# Patient Record
Sex: FEMALE | Race: WHITE | NOT HISPANIC OR LATINO | Employment: FULL TIME | URBAN - METROPOLITAN AREA
[De-identification: names, ages, dates, MRNs, and addresses within clinical notes are randomized per-mention and may not be internally consistent; named-entity substitution may affect disease eponyms.]

---

## 2018-03-15 ENCOUNTER — CONVERSION ENCOUNTER (OUTPATIENT)
Dept: FAMILY MEDICINE CLINIC | Facility: CLINIC | Age: 53
End: 2018-03-15

## 2018-03-16 ENCOUNTER — OFFICE VISIT CONVERTED (OUTPATIENT)
Dept: FAMILY MEDICINE CLINIC | Facility: CLINIC | Age: 53
End: 2018-03-16
Attending: FAMILY MEDICINE

## 2018-03-23 ENCOUNTER — OFFICE VISIT CONVERTED (OUTPATIENT)
Dept: CARDIOLOGY | Facility: CLINIC | Age: 53
End: 2018-03-23
Attending: INTERNAL MEDICINE

## 2018-03-29 ENCOUNTER — CONVERSION ENCOUNTER (OUTPATIENT)
Dept: CARDIOLOGY | Facility: CLINIC | Age: 53
End: 2018-03-29
Attending: INTERNAL MEDICINE

## 2018-03-29 ENCOUNTER — CONVERSION ENCOUNTER (OUTPATIENT)
Dept: CARDIOLOGY | Facility: CLINIC | Age: 53
End: 2018-03-29

## 2018-06-07 ENCOUNTER — OFFICE VISIT CONVERTED (OUTPATIENT)
Dept: OTOLARYNGOLOGY | Facility: CLINIC | Age: 53
End: 2018-06-07
Attending: OTOLARYNGOLOGY

## 2018-06-07 ENCOUNTER — CONVERSION ENCOUNTER (OUTPATIENT)
Dept: OTOLARYNGOLOGY | Facility: CLINIC | Age: 53
End: 2018-06-07

## 2018-07-23 ENCOUNTER — OFFICE VISIT CONVERTED (OUTPATIENT)
Dept: FAMILY MEDICINE CLINIC | Facility: CLINIC | Age: 53
End: 2018-07-23
Attending: NURSE PRACTITIONER

## 2018-10-04 ENCOUNTER — OFFICE VISIT CONVERTED (OUTPATIENT)
Dept: OTOLARYNGOLOGY | Facility: CLINIC | Age: 53
End: 2018-10-04
Attending: OTOLARYNGOLOGY

## 2018-10-26 ENCOUNTER — CONVERSION ENCOUNTER (OUTPATIENT)
Dept: GENERAL RADIOLOGY | Facility: HOSPITAL | Age: 53
End: 2018-10-26

## 2018-11-14 ENCOUNTER — OFFICE VISIT CONVERTED (OUTPATIENT)
Dept: NEUROSURGERY | Facility: CLINIC | Age: 53
End: 2018-11-14
Attending: PHYSICIAN ASSISTANT

## 2018-11-14 ENCOUNTER — CONVERSION ENCOUNTER (OUTPATIENT)
Dept: NEUROLOGY | Facility: CLINIC | Age: 53
End: 2018-11-14

## 2018-12-20 ENCOUNTER — OFFICE VISIT CONVERTED (OUTPATIENT)
Dept: NEUROLOGY | Facility: CLINIC | Age: 53
End: 2018-12-20
Attending: PSYCHIATRY & NEUROLOGY

## 2019-01-03 ENCOUNTER — OFFICE VISIT CONVERTED (OUTPATIENT)
Dept: NEUROSURGERY | Facility: CLINIC | Age: 54
End: 2019-01-03
Attending: NEUROLOGICAL SURGERY

## 2019-01-16 ENCOUNTER — HOSPITAL ENCOUNTER (OUTPATIENT)
Dept: PHYSICAL THERAPY | Facility: CLINIC | Age: 54
Setting detail: RECURRING SERIES
Discharge: HOME | End: 2019-01-16
Attending: NEUROLOGICAL SURGERY

## 2019-01-31 ENCOUNTER — OFFICE VISIT CONVERTED (OUTPATIENT)
Dept: NEUROSURGERY | Facility: CLINIC | Age: 54
End: 2019-01-31
Attending: NEUROLOGICAL SURGERY

## 2019-02-08 ENCOUNTER — HOSPITAL ENCOUNTER (OUTPATIENT)
Dept: PERIOP | Facility: HOSPITAL | Age: 54
Setting detail: HOSPITAL OUTPATIENT SURGERY
Discharge: HOME | End: 2019-02-08
Attending: NEUROLOGICAL SURGERY

## 2019-02-21 ENCOUNTER — OFFICE VISIT CONVERTED (OUTPATIENT)
Dept: NEUROSURGERY | Facility: CLINIC | Age: 54
End: 2019-02-21
Attending: PHYSICIAN ASSISTANT

## 2019-03-05 ENCOUNTER — HOSPITAL ENCOUNTER (OUTPATIENT)
Dept: FAMILY MEDICINE CLINIC | Facility: CLINIC | Age: 54
Discharge: HOME | End: 2019-03-05
Attending: FAMILY MEDICINE

## 2019-03-07 LAB — BACTERIA UR CULT: NORMAL

## 2019-03-25 ENCOUNTER — HOSPITAL ENCOUNTER (OUTPATIENT)
Dept: FAMILY MEDICINE CLINIC | Facility: CLINIC | Age: 54
Discharge: HOME | End: 2019-03-25
Attending: FAMILY MEDICINE

## 2019-03-30 ENCOUNTER — HOSPITAL ENCOUNTER (OUTPATIENT)
Dept: FAMILY MEDICINE CLINIC | Facility: CLINIC | Age: 54
Discharge: HOME | End: 2019-03-30
Attending: FAMILY MEDICINE

## 2019-04-02 ENCOUNTER — HOSPITAL ENCOUNTER (OUTPATIENT)
Dept: CT IMAGING | Facility: HOSPITAL | Age: 54
Discharge: HOME | End: 2019-04-02
Attending: NURSE PRACTITIONER

## 2019-04-02 LAB — D DIMER PPP FEU-MCNC: 0.79 MG/L (ref 0–0.59)

## 2019-04-30 ENCOUNTER — HOSPITAL ENCOUNTER (OUTPATIENT)
Dept: FAMILY MEDICINE CLINIC | Facility: CLINIC | Age: 54
Discharge: HOME | End: 2019-04-30
Attending: INTERNAL MEDICINE

## 2019-04-30 LAB
ALBUMIN SERPL-MCNC: 4 G/DL (ref 3.5–5)
ALBUMIN/GLOB SERPL: 1.4 {RATIO} (ref 1.4–2.6)
ALP SERPL-CCNC: 144 U/L (ref 53–141)
ALT SERPL-CCNC: 26 U/L (ref 10–40)
ANION GAP SERPL CALC-SCNC: 19 MMOL/L (ref 8–19)
AST SERPL-CCNC: 29 U/L (ref 15–50)
BASOPHILS # BLD AUTO: 0.02 10*3/UL (ref 0–0.2)
BASOPHILS NFR BLD AUTO: 0.4 % (ref 0–3)
BILIRUB SERPL-MCNC: 0.23 MG/DL (ref 0.2–1.3)
BUN SERPL-MCNC: 19 MG/DL (ref 5–25)
BUN/CREAT SERPL: 18 {RATIO} (ref 6–20)
CALCIUM SERPL-MCNC: 9.3 MG/DL (ref 8.7–10.4)
CHLORIDE SERPL-SCNC: 98 MMOL/L (ref 99–111)
CONV ABS IMM GRAN: 0.01 10*3/UL (ref 0–0.2)
CONV CO2: 23 MMOL/L (ref 22–32)
CONV IMMATURE GRAN: 0.2 % (ref 0–1.8)
CONV TOTAL PROTEIN: 6.8 G/DL (ref 6.3–8.2)
CREAT UR-MCNC: 1.07 MG/DL (ref 0.5–0.9)
DEPRECATED RDW RBC AUTO: 50 FL (ref 36.4–46.3)
EOSINOPHIL # BLD AUTO: 0.17 10*3/UL (ref 0–0.7)
EOSINOPHIL # BLD AUTO: 3.5 % (ref 0–7)
ERYTHROCYTE [DISTWIDTH] IN BLOOD BY AUTOMATED COUNT: 13.9 % (ref 11.7–14.4)
GFR SERPLBLD BASED ON 1.73 SQ M-ARVRAT: 58 ML/MIN/{1.73_M2}
GLOBULIN UR ELPH-MCNC: 2.8 G/DL (ref 2–3.5)
GLUCOSE SERPL-MCNC: 86 MG/DL (ref 65–99)
HBA1C MFR BLD: 11.6 G/DL (ref 12–16)
HCT VFR BLD AUTO: 37.3 % (ref 37–47)
LYMPHOCYTES # BLD AUTO: 1.34 10*3/UL (ref 1–5)
MCH RBC QN AUTO: 30.1 PG (ref 27–31)
MCHC RBC AUTO-ENTMCNC: 31.1 G/DL (ref 33–37)
MCV RBC AUTO: 96.9 FL (ref 81–99)
MONOCYTES # BLD AUTO: 0.38 10*3/UL (ref 0.2–1.2)
MONOCYTES NFR BLD AUTO: 7.8 % (ref 3–10)
NEUTROPHILS # BLD AUTO: 2.98 10*3/UL (ref 2–8)
NEUTROPHILS NFR BLD AUTO: 60.8 % (ref 30–85)
NRBC CBCN: 0 % (ref 0–0.7)
OSMOLALITY SERPL CALC.SUM OF ELEC: 284 MOSM/KG (ref 273–304)
PLATELET # BLD AUTO: 345 10*3/UL (ref 130–400)
PMV BLD AUTO: 10.6 FL (ref 9.4–12.3)
POTASSIUM SERPL-SCNC: 4.3 MMOL/L (ref 3.5–5.3)
RBC # BLD AUTO: 3.85 10*6/UL (ref 4.2–5.4)
SODIUM SERPL-SCNC: 136 MMOL/L (ref 135–147)
VARIANT LYMPHS NFR BLD MANUAL: 27.3 % (ref 20–45)
WBC # BLD AUTO: 4.9 10*3/UL (ref 4.8–10.8)

## 2019-05-23 ENCOUNTER — HOSPITAL ENCOUNTER (OUTPATIENT)
Dept: FAMILY MEDICINE CLINIC | Facility: CLINIC | Age: 54
Discharge: HOME | End: 2019-05-23
Attending: FAMILY MEDICINE

## 2019-05-23 LAB
25(OH)D3 SERPL-MCNC: 25.4 NG/ML (ref 30–100)
ALBUMIN SERPL-MCNC: 4.5 G/DL (ref 3.5–5)
ALBUMIN/GLOB SERPL: 1.5 {RATIO} (ref 1.4–2.6)
ALP SERPL-CCNC: 179 U/L (ref 53–141)
ALT SERPL-CCNC: 26 U/L (ref 10–40)
ANION GAP SERPL CALC-SCNC: 18 MMOL/L (ref 8–19)
AST SERPL-CCNC: 27 U/L (ref 15–50)
BASOPHILS # BLD AUTO: 0.03 10*3/UL (ref 0–0.2)
BASOPHILS NFR BLD AUTO: 0.6 % (ref 0–3)
BILIRUB SERPL-MCNC: 0.22 MG/DL (ref 0.2–1.3)
BUN SERPL-MCNC: 20 MG/DL (ref 5–25)
BUN/CREAT SERPL: 22 {RATIO} (ref 6–20)
CALCIUM SERPL-MCNC: 9.6 MG/DL (ref 8.7–10.4)
CHLORIDE SERPL-SCNC: 101 MMOL/L (ref 99–111)
CONV ABS IMM GRAN: 0.01 10*3/UL (ref 0–0.2)
CONV CO2: 27 MMOL/L (ref 22–32)
CONV IMMATURE GRAN: 0.2 % (ref 0–1.8)
CONV TOTAL PROTEIN: 7.6 G/DL (ref 6.3–8.2)
CREAT UR-MCNC: 0.92 MG/DL (ref 0.5–0.9)
DEPRECATED RDW RBC AUTO: 46.2 FL (ref 36.4–46.3)
EOSINOPHIL # BLD AUTO: 0.23 10*3/UL (ref 0–0.7)
EOSINOPHIL # BLD AUTO: 4.7 % (ref 0–7)
ERYTHROCYTE [DISTWIDTH] IN BLOOD BY AUTOMATED COUNT: 13.5 % (ref 11.7–14.4)
FOLATE SERPL-MCNC: 6.2 NG/ML (ref 4.8–20)
GFR SERPLBLD BASED ON 1.73 SQ M-ARVRAT: >60 ML/MIN/{1.73_M2}
GLOBULIN UR ELPH-MCNC: 3.1 G/DL (ref 2–3.5)
GLUCOSE SERPL-MCNC: 93 MG/DL (ref 65–99)
HBA1C MFR BLD: 12.8 G/DL (ref 12–16)
HCT VFR BLD AUTO: 39.4 % (ref 37–47)
LYMPHOCYTES # BLD AUTO: 1.42 10*3/UL (ref 1–5)
MCH RBC QN AUTO: 30.3 PG (ref 27–31)
MCHC RBC AUTO-ENTMCNC: 32.5 G/DL (ref 33–37)
MCV RBC AUTO: 93.4 FL (ref 81–99)
MONOCYTES # BLD AUTO: 0.29 10*3/UL (ref 0.2–1.2)
MONOCYTES NFR BLD AUTO: 6 % (ref 3–10)
NEUTROPHILS # BLD AUTO: 2.88 10*3/UL (ref 2–8)
NEUTROPHILS NFR BLD AUTO: 59.3 % (ref 30–85)
NRBC CBCN: 0 % (ref 0–0.7)
OSMOLALITY SERPL CALC.SUM OF ELEC: 294 MOSM/KG (ref 273–304)
PLATELET # BLD AUTO: 346 10*3/UL (ref 130–400)
PMV BLD AUTO: 10.8 FL (ref 9.4–12.3)
POTASSIUM SERPL-SCNC: 4.6 MMOL/L (ref 3.5–5.3)
RBC # BLD AUTO: 4.22 10*6/UL (ref 4.2–5.4)
SODIUM SERPL-SCNC: 141 MMOL/L (ref 135–147)
TSH SERPL-ACNC: 1.78 M[IU]/L (ref 0.27–4.2)
VARIANT LYMPHS NFR BLD MANUAL: 29.2 % (ref 20–45)
VIT B12 SERPL-MCNC: 602 PG/ML (ref 211–911)
WBC # BLD AUTO: 4.86 10*3/UL (ref 4.8–10.8)

## 2019-05-26 LAB
CONV EBV EARLY ANTIGEN: 33.5 U/ML (ref 0–10.9)
CONV EBV NUCLEAR ANTIGEN: >600 U/ML (ref 0–21.9)
CONV EPSTEIN BARR VIRAL CAPSID IGM: <10 U/ML (ref 0–43.9)
CONV EPSTEIN BARR VIRUS ANTIBODY TO VIRAL CAPSID IGG: 523 U/ML (ref 0–21.9)

## 2019-06-05 ENCOUNTER — HOSPITAL ENCOUNTER (OUTPATIENT)
Dept: FAMILY MEDICINE CLINIC | Facility: CLINIC | Age: 54
Discharge: HOME | End: 2019-06-05
Attending: INTERNAL MEDICINE

## 2019-06-05 LAB
CORTIS AM PEAK SERPL-MCNC: 13.9 UG/DL (ref 6–18.4)
CORTIS PM SERPL-MCNC: 13.8 UG/DL (ref 2.7–10.5)

## 2019-07-29 ENCOUNTER — HOSPITAL ENCOUNTER (OUTPATIENT)
Dept: LAB | Facility: HOSPITAL | Age: 54
Discharge: HOME | End: 2019-07-29

## 2019-07-29 LAB
ALBUMIN SERPL-MCNC: 4.5 G/DL (ref 3.5–5)
ALBUMIN/GLOB SERPL: 1.7 {RATIO} (ref 1.4–2.6)
ALP SERPL-CCNC: 180 U/L (ref 53–141)
ALT SERPL-CCNC: 32 U/L (ref 10–40)
ANION GAP SERPL CALC-SCNC: 18 MMOL/L (ref 8–19)
AST SERPL-CCNC: 32 U/L (ref 15–50)
BASOPHILS # BLD AUTO: 0.02 10*3/UL (ref 0–0.2)
BASOPHILS NFR BLD AUTO: 0.4 % (ref 0–3)
BILIRUB SERPL-MCNC: 0.23 MG/DL (ref 0.2–1.3)
BUN SERPL-MCNC: 14 MG/DL (ref 5–25)
BUN/CREAT SERPL: 16 {RATIO} (ref 6–20)
CALCIUM SERPL-MCNC: 9 MG/DL (ref 8.7–10.4)
CHLORIDE SERPL-SCNC: 102 MMOL/L (ref 99–111)
CHOLEST SERPL-MCNC: 172 MG/DL (ref 107–200)
CHOLEST/HDLC SERPL: 2.8 {RATIO} (ref 3–6)
CONV ABS IMM GRAN: 0.02 10*3/UL (ref 0–0.2)
CONV CO2: 25 MMOL/L (ref 22–32)
CONV IMMATURE GRAN: 0.4 % (ref 0–1.8)
CONV TOTAL PROTEIN: 7.2 G/DL (ref 6.3–8.2)
CREAT UR-MCNC: 0.85 MG/DL (ref 0.5–0.9)
DEPRECATED RDW RBC AUTO: 45.7 FL (ref 36.4–46.3)
EOSINOPHIL # BLD AUTO: 0.5 10*3/UL (ref 0–0.7)
EOSINOPHIL # BLD AUTO: 10.7 % (ref 0–7)
ERYTHROCYTE [DISTWIDTH] IN BLOOD BY AUTOMATED COUNT: 13.5 % (ref 11.7–14.4)
GFR SERPLBLD BASED ON 1.73 SQ M-ARVRAT: >60 ML/MIN/{1.73_M2}
GLOBULIN UR ELPH-MCNC: 2.7 G/DL (ref 2–3.5)
GLUCOSE SERPL-MCNC: 89 MG/DL (ref 65–99)
HBA1C MFR BLD: 12.3 G/DL (ref 12–16)
HCT VFR BLD AUTO: 37.2 % (ref 37–47)
HDLC SERPL-MCNC: 62 MG/DL (ref 40–60)
LDLC SERPL CALC-MCNC: 98 MG/DL (ref 70–100)
LYMPHOCYTES # BLD AUTO: 1.3 10*3/UL (ref 1–5)
MCH RBC QN AUTO: 30.5 PG (ref 27–31)
MCHC RBC AUTO-ENTMCNC: 33.1 G/DL (ref 33–37)
MCV RBC AUTO: 92.3 FL (ref 81–99)
MONOCYTES # BLD AUTO: 0.39 10*3/UL (ref 0.2–1.2)
MONOCYTES NFR BLD AUTO: 8.3 % (ref 3–10)
NEUTROPHILS # BLD AUTO: 2.46 10*3/UL (ref 2–8)
NEUTROPHILS NFR BLD AUTO: 52.5 % (ref 30–85)
NRBC CBCN: 0 % (ref 0–0.7)
OSMOLALITY SERPL CALC.SUM OF ELEC: 292 MOSM/KG (ref 273–304)
PLATELET # BLD AUTO: 318 10*3/UL (ref 130–400)
PMV BLD AUTO: 10.1 FL (ref 9.4–12.3)
POTASSIUM SERPL-SCNC: 4 MMOL/L (ref 3.5–5.3)
RBC # BLD AUTO: 4.03 10*6/UL (ref 4.2–5.4)
SODIUM SERPL-SCNC: 141 MMOL/L (ref 135–147)
TRIGL SERPL-MCNC: 62 MG/DL (ref 40–150)
TSH SERPL-ACNC: 2.42 M[IU]/L (ref 0.27–4.2)
VARIANT LYMPHS NFR BLD MANUAL: 27.7 % (ref 20–45)
VLDLC SERPL-MCNC: 12 MG/DL (ref 5–37)
WBC # BLD AUTO: 4.69 10*3/UL (ref 4.8–10.8)

## 2019-08-13 ENCOUNTER — HOSPITAL ENCOUNTER (OUTPATIENT)
Dept: FAMILY MEDICINE CLINIC | Facility: CLINIC | Age: 54
Discharge: HOME | End: 2019-08-13
Attending: INTERNAL MEDICINE

## 2019-08-13 LAB
ALBUMIN SERPL-MCNC: 4.4 G/DL (ref 3.5–5)
ALP SERPL-CCNC: 196 U/L (ref 53–141)
ALT SERPL-CCNC: 32 U/L (ref 10–40)
AST SERPL-CCNC: 30 U/L (ref 15–50)
BILIRUB SERPL-MCNC: 0.21 MG/DL (ref 0.2–1.3)
CONV BILI, CONJUGATED: <0.2 MG/DL (ref 0–0.6)
CONV TOTAL PROTEIN: 7.4 G/DL (ref 6.3–8.2)
CONV UNCONJUGATED BILIRUBIN: 0 MG/DL (ref 0–1.1)

## 2019-08-24 ENCOUNTER — OFFICE VISIT CONVERTED (OUTPATIENT)
Dept: FAMILY MEDICINE CLINIC | Facility: CLINIC | Age: 54
End: 2019-08-24
Attending: FAMILY MEDICINE

## 2019-08-24 ENCOUNTER — CONVERSION ENCOUNTER (OUTPATIENT)
Dept: FAMILY MEDICINE CLINIC | Facility: CLINIC | Age: 54
End: 2019-08-24

## 2019-08-24 ENCOUNTER — HOSPITAL ENCOUNTER (OUTPATIENT)
Dept: FAMILY MEDICINE CLINIC | Facility: CLINIC | Age: 54
Discharge: HOME | End: 2019-08-24

## 2019-08-27 ENCOUNTER — HOSPITAL ENCOUNTER (OUTPATIENT)
Dept: OTHER | Facility: HOSPITAL | Age: 54
Discharge: HOME | End: 2019-08-27
Attending: FAMILY MEDICINE

## 2019-10-12 ENCOUNTER — HOSPITAL ENCOUNTER (OUTPATIENT)
Dept: FAMILY MEDICINE CLINIC | Facility: CLINIC | Age: 54
Discharge: HOME | End: 2019-10-12
Attending: INTERNAL MEDICINE

## 2019-10-12 LAB
ALBUMIN SERPL-MCNC: 4.6 G/DL (ref 3.5–5)
ALBUMIN/GLOB SERPL: 1.6 {RATIO} (ref 1.4–2.6)
ALP SERPL-CCNC: 182 U/L (ref 53–141)
ALT SERPL-CCNC: 28 U/L (ref 10–40)
ANION GAP SERPL CALC-SCNC: 17 MMOL/L (ref 8–19)
AST SERPL-CCNC: 30 U/L (ref 15–50)
BASOPHILS # BLD AUTO: 0.03 10*3/UL (ref 0–0.2)
BASOPHILS NFR BLD AUTO: 0.6 % (ref 0–3)
BILIRUB SERPL-MCNC: 0.2 MG/DL (ref 0.2–1.3)
BUN SERPL-MCNC: 17 MG/DL (ref 5–25)
BUN/CREAT SERPL: 20 {RATIO} (ref 6–20)
CALCIUM SERPL-MCNC: 9.7 MG/DL (ref 8.7–10.4)
CHLORIDE SERPL-SCNC: 98 MMOL/L (ref 99–111)
CONV ABS IMM GRAN: 0.01 10*3/UL (ref 0–0.2)
CONV AFP: 2.4 NG/ML (ref 0–8.7)
CONV CO2: 28 MMOL/L (ref 22–32)
CONV IMMATURE GRAN: 0.2 % (ref 0–1.8)
CONV TOTAL PROTEIN: 7.4 G/DL (ref 6.3–8.2)
CREAT UR-MCNC: 0.87 MG/DL (ref 0.5–0.9)
DEPRECATED RDW RBC AUTO: 45 FL (ref 36.4–46.3)
EOSINOPHIL # BLD AUTO: 0.22 10*3/UL (ref 0–0.7)
EOSINOPHIL # BLD AUTO: 4.2 % (ref 0–7)
ERYTHROCYTE [DISTWIDTH] IN BLOOD BY AUTOMATED COUNT: 13.2 % (ref 11.7–14.4)
GFR SERPLBLD BASED ON 1.73 SQ M-ARVRAT: >60 ML/MIN/{1.73_M2}
GLOBULIN UR ELPH-MCNC: 2.8 G/DL (ref 2–3.5)
GLUCOSE SERPL-MCNC: 96 MG/DL (ref 65–99)
HCT VFR BLD AUTO: 38.9 % (ref 37–47)
HGB BLD-MCNC: 12.5 G/DL (ref 12–16)
LYMPHOCYTES # BLD AUTO: 1.62 10*3/UL (ref 1–5)
LYMPHOCYTES NFR BLD AUTO: 31 % (ref 20–45)
MCH RBC QN AUTO: 29.9 PG (ref 27–31)
MCHC RBC AUTO-ENTMCNC: 32.1 G/DL (ref 33–37)
MCV RBC AUTO: 93.1 FL (ref 81–99)
MONOCYTES # BLD AUTO: 0.37 10*3/UL (ref 0.2–1.2)
MONOCYTES NFR BLD AUTO: 7.1 % (ref 3–10)
NEUTROPHILS # BLD AUTO: 2.97 10*3/UL (ref 2–8)
NEUTROPHILS NFR BLD AUTO: 56.9 % (ref 30–85)
NRBC CBCN: 0 % (ref 0–0.7)
OSMOLALITY SERPL CALC.SUM OF ELEC: 289 MOSM/KG (ref 273–304)
PLATELET # BLD AUTO: 344 10*3/UL (ref 130–400)
PMV BLD AUTO: 10.9 FL (ref 9.4–12.3)
POTASSIUM SERPL-SCNC: 3.8 MMOL/L (ref 3.5–5.3)
RBC # BLD AUTO: 4.18 10*6/UL (ref 4.2–5.4)
SODIUM SERPL-SCNC: 139 MMOL/L (ref 135–147)
WBC # BLD AUTO: 5.22 10*3/UL (ref 4.8–10.8)

## 2019-10-29 ENCOUNTER — CONVERSION ENCOUNTER (OUTPATIENT)
Dept: FAMILY MEDICINE CLINIC | Facility: CLINIC | Age: 54
End: 2019-10-29

## 2019-10-29 ENCOUNTER — HOSPITAL ENCOUNTER (OUTPATIENT)
Dept: OTHER | Facility: HOSPITAL | Age: 54
Discharge: HOME | End: 2019-10-29
Attending: OTOLARYNGOLOGY

## 2019-10-31 ENCOUNTER — CONVERSION ENCOUNTER (OUTPATIENT)
Dept: OTOLARYNGOLOGY | Facility: CLINIC | Age: 54
End: 2019-10-31

## 2019-10-31 ENCOUNTER — OFFICE VISIT CONVERTED (OUTPATIENT)
Dept: OTOLARYNGOLOGY | Facility: CLINIC | Age: 54
End: 2019-10-31
Attending: OTOLARYNGOLOGY

## 2019-12-13 ENCOUNTER — HOSPITAL ENCOUNTER (OUTPATIENT)
Dept: GENERAL RADIOLOGY | Facility: HOSPITAL | Age: 54
Discharge: HOME | End: 2019-12-13
Attending: FAMILY MEDICINE

## 2020-01-23 ENCOUNTER — HOSPITAL ENCOUNTER (OUTPATIENT)
Dept: FAMILY MEDICINE CLINIC | Facility: CLINIC | Age: 55
Discharge: HOME | End: 2020-01-23
Attending: FAMILY MEDICINE

## 2020-01-23 LAB
25(OH)D3 SERPL-MCNC: 23.9 NG/ML (ref 30–100)
ALBUMIN SERPL-MCNC: 4.1 G/DL (ref 3.5–5)
ALBUMIN/GLOB SERPL: 1.3 {RATIO} (ref 1.4–2.6)
ALP SERPL-CCNC: 187 U/L (ref 53–141)
ALT SERPL-CCNC: 24 U/L (ref 10–40)
ANION GAP SERPL CALC-SCNC: 22 MMOL/L (ref 8–19)
AST SERPL-CCNC: 29 U/L (ref 15–50)
BASOPHILS # BLD AUTO: 0.03 10*3/UL (ref 0–0.2)
BASOPHILS NFR BLD AUTO: 0.5 % (ref 0–3)
BILIRUB SERPL-MCNC: 0.24 MG/DL (ref 0.2–1.3)
BUN SERPL-MCNC: 11 MG/DL (ref 5–25)
BUN/CREAT SERPL: 14 {RATIO} (ref 6–20)
CALCIUM SERPL-MCNC: 9.7 MG/DL (ref 8.7–10.4)
CHLORIDE SERPL-SCNC: 101 MMOL/L (ref 99–111)
CONV ABS IMM GRAN: 0 10*3/UL (ref 0–0.2)
CONV CO2: 23 MMOL/L (ref 22–32)
CONV IMMATURE GRAN: 0 % (ref 0–1.8)
CONV TOTAL PROTEIN: 7.2 G/DL (ref 6.3–8.2)
CREAT UR-MCNC: 0.79 MG/DL (ref 0.5–0.9)
DEPRECATED RDW RBC AUTO: 46.6 FL (ref 36.4–46.3)
EOSINOPHIL # BLD AUTO: 0.28 10*3/UL (ref 0–0.7)
EOSINOPHIL # BLD AUTO: 4.9 % (ref 0–7)
ERYTHROCYTE [DISTWIDTH] IN BLOOD BY AUTOMATED COUNT: 13.4 % (ref 11.7–14.4)
FOLATE SERPL-MCNC: 6.1 NG/ML (ref 4.8–20)
GFR SERPLBLD BASED ON 1.73 SQ M-ARVRAT: >60 ML/MIN/{1.73_M2}
GLOBULIN UR ELPH-MCNC: 3.1 G/DL (ref 2–3.5)
GLUCOSE SERPL-MCNC: 88 MG/DL (ref 65–99)
HCT VFR BLD AUTO: 38.2 % (ref 37–47)
HGB BLD-MCNC: 12 G/DL (ref 12–16)
LYMPHOCYTES # BLD AUTO: 1.38 10*3/UL (ref 1–5)
LYMPHOCYTES NFR BLD AUTO: 24 % (ref 20–45)
MCH RBC QN AUTO: 29.7 PG (ref 27–31)
MCHC RBC AUTO-ENTMCNC: 31.4 G/DL (ref 33–37)
MCV RBC AUTO: 94.6 FL (ref 81–99)
MONOCYTES # BLD AUTO: 0.47 10*3/UL (ref 0.2–1.2)
MONOCYTES NFR BLD AUTO: 8.2 % (ref 3–10)
NEUTROPHILS # BLD AUTO: 3.6 10*3/UL (ref 2–8)
NEUTROPHILS NFR BLD AUTO: 62.4 % (ref 30–85)
NRBC CBCN: 0 % (ref 0–0.7)
OSMOLALITY SERPL CALC.SUM OF ELEC: 291 MOSM/KG (ref 273–304)
PLATELET # BLD AUTO: 347 10*3/UL (ref 130–400)
PMV BLD AUTO: 11.1 FL (ref 9.4–12.3)
POTASSIUM SERPL-SCNC: 4.8 MMOL/L (ref 3.5–5.3)
RBC # BLD AUTO: 4.04 10*6/UL (ref 4.2–5.4)
SODIUM SERPL-SCNC: 141 MMOL/L (ref 135–147)
T4 FREE SERPL-MCNC: 1.4 NG/DL (ref 0.9–1.8)
TSH SERPL-ACNC: 2.68 M[IU]/L (ref 0.27–4.2)
VIT B12 SERPL-MCNC: 549 PG/ML (ref 211–911)
WBC # BLD AUTO: 5.76 10*3/UL (ref 4.8–10.8)

## 2020-01-26 LAB
CONV EBV EARLY ANTIGEN: 23.3 U/ML (ref 0–10.9)
CONV EBV NUCLEAR ANTIGEN: 524 U/ML (ref 0–21.9)
CONV EPSTEIN BARR VIRAL CAPSID IGM: <10 U/ML (ref 0–43.9)
CONV EPSTEIN BARR VIRUS ANTIBODY TO VIRAL CAPSID IGG: 545 U/ML (ref 0–21.9)

## 2020-02-12 ENCOUNTER — HOSPITAL ENCOUNTER (OUTPATIENT)
Dept: FAMILY MEDICINE CLINIC | Facility: CLINIC | Age: 55
Discharge: HOME | End: 2020-02-12
Attending: NURSE PRACTITIONER

## 2020-02-14 LAB
ASO AB SERPL-ACNC: 20 [IU]/ML (ref 0–200)
CONV RHEUMATOID FACTOR IGM: 10.3 [IU]/ML (ref 0–14)
CRP SERPL-MCNC: 3.3 MG/L (ref 0–5)
DSDNA AB SER-ACNC: NEGATIVE [IU]/ML
ENA AB SER IA-ACNC: NEGATIVE {RATIO}
URATE SERPL-MCNC: 4.5 MG/DL (ref 2.5–7.5)

## 2020-02-19 LAB — CONV ANTI GALACTOSE ALPHA 1,3 IGE: <0.1 KU/L

## 2020-03-19 ENCOUNTER — HOSPITAL ENCOUNTER (OUTPATIENT)
Dept: FAMILY MEDICINE CLINIC | Facility: CLINIC | Age: 55
Discharge: HOME | End: 2020-03-19
Attending: INTERNAL MEDICINE

## 2020-03-19 LAB
ALBUMIN SERPL-MCNC: 4.5 G/DL (ref 3.5–5)
ALBUMIN/GLOB SERPL: 1.4 {RATIO} (ref 1.4–2.6)
ALP SERPL-CCNC: 191 U/L (ref 53–141)
ALT SERPL-CCNC: 29 U/L (ref 10–40)
ANION GAP SERPL CALC-SCNC: 18 MMOL/L (ref 8–19)
AST SERPL-CCNC: 26 U/L (ref 15–50)
BASOPHILS # BLD AUTO: 0.02 10*3/UL (ref 0–0.2)
BASOPHILS NFR BLD AUTO: 0.4 % (ref 0–3)
BILIRUB SERPL-MCNC: 0.22 MG/DL (ref 0.2–1.3)
BUN SERPL-MCNC: 21 MG/DL (ref 5–25)
BUN/CREAT SERPL: 24 {RATIO} (ref 6–20)
CALCIUM SERPL-MCNC: 9.5 MG/DL (ref 8.7–10.4)
CHLORIDE SERPL-SCNC: 98 MMOL/L (ref 99–111)
CONV ABS IMM GRAN: 0 10*3/UL (ref 0–0.2)
CONV CO2: 25 MMOL/L (ref 22–32)
CONV IMMATURE GRAN: 0 % (ref 0–1.8)
CONV TOTAL PROTEIN: 7.8 G/DL (ref 6.3–8.2)
CREAT UR-MCNC: 0.89 MG/DL (ref 0.5–0.9)
DEPRECATED RDW RBC AUTO: 45.4 FL (ref 36.4–46.3)
EOSINOPHIL # BLD AUTO: 0.15 10*3/UL (ref 0–0.7)
EOSINOPHIL # BLD AUTO: 2.9 % (ref 0–7)
ERYTHROCYTE [DISTWIDTH] IN BLOOD BY AUTOMATED COUNT: 13.3 % (ref 11.7–14.4)
GFR SERPLBLD BASED ON 1.73 SQ M-ARVRAT: >60 ML/MIN/{1.73_M2}
GLOBULIN UR ELPH-MCNC: 3.3 G/DL (ref 2–3.5)
GLUCOSE SERPL-MCNC: 136 MG/DL (ref 65–99)
HCT VFR BLD AUTO: 40.8 % (ref 37–47)
HGB BLD-MCNC: 13 G/DL (ref 12–16)
LYMPHOCYTES # BLD AUTO: 1.43 10*3/UL (ref 1–5)
LYMPHOCYTES NFR BLD AUTO: 28 % (ref 20–45)
MCH RBC QN AUTO: 29.7 PG (ref 27–31)
MCHC RBC AUTO-ENTMCNC: 31.9 G/DL (ref 33–37)
MCV RBC AUTO: 93.4 FL (ref 81–99)
MONOCYTES # BLD AUTO: 0.25 10*3/UL (ref 0.2–1.2)
MONOCYTES NFR BLD AUTO: 4.9 % (ref 3–10)
NEUTROPHILS # BLD AUTO: 3.26 10*3/UL (ref 2–8)
NEUTROPHILS NFR BLD AUTO: 63.8 % (ref 30–85)
NRBC CBCN: 0 % (ref 0–0.7)
OSMOLALITY SERPL CALC.SUM OF ELEC: 289 MOSM/KG (ref 273–304)
PLATELET # BLD AUTO: 377 10*3/UL (ref 130–400)
PMV BLD AUTO: 10.9 FL (ref 9.4–12.3)
POTASSIUM SERPL-SCNC: 4.1 MMOL/L (ref 3.5–5.3)
RBC # BLD AUTO: 4.37 10*6/UL (ref 4.2–5.4)
SODIUM SERPL-SCNC: 137 MMOL/L (ref 135–147)
WBC # BLD AUTO: 5.11 10*3/UL (ref 4.8–10.8)

## 2020-04-02 ENCOUNTER — HOSPITAL ENCOUNTER (OUTPATIENT)
Dept: FAMILY MEDICINE CLINIC | Facility: CLINIC | Age: 55
Discharge: HOME | End: 2020-04-02
Attending: FAMILY MEDICINE

## 2020-04-02 LAB
ALBUMIN SERPL-MCNC: 4.4 G/DL (ref 3.5–5)
ALBUMIN/GLOB SERPL: 1.4 {RATIO} (ref 1.4–2.6)
ALP SERPL-CCNC: 187 U/L (ref 53–141)
ALT SERPL-CCNC: 32 U/L (ref 10–40)
ANION GAP SERPL CALC-SCNC: 16 MMOL/L (ref 8–19)
AST SERPL-CCNC: 29 U/L (ref 15–50)
BASOPHILS # BLD AUTO: 0.03 10*3/UL (ref 0–0.2)
BASOPHILS NFR BLD AUTO: 0.5 % (ref 0–3)
BILIRUB SERPL-MCNC: 0.29 MG/DL (ref 0.2–1.3)
BUN SERPL-MCNC: 15 MG/DL (ref 5–25)
BUN/CREAT SERPL: 17 {RATIO} (ref 6–20)
CALCIUM SERPL-MCNC: 9.6 MG/DL (ref 8.7–10.4)
CHLORIDE SERPL-SCNC: 100 MMOL/L (ref 99–111)
CONV ABS IMM GRAN: 0.01 10*3/UL (ref 0–0.2)
CONV CO2: 27 MMOL/L (ref 22–32)
CONV IMMATURE GRAN: 0.2 % (ref 0–1.8)
CONV TOTAL PROTEIN: 7.5 G/DL (ref 6.3–8.2)
CREAT UR-MCNC: 0.86 MG/DL (ref 0.5–0.9)
DEPRECATED RDW RBC AUTO: 45.1 FL (ref 36.4–46.3)
EOSINOPHIL # BLD AUTO: 0.15 10*3/UL (ref 0–0.7)
EOSINOPHIL # BLD AUTO: 2.5 % (ref 0–7)
ERYTHROCYTE [DISTWIDTH] IN BLOOD BY AUTOMATED COUNT: 13.2 % (ref 11.7–14.4)
GFR SERPLBLD BASED ON 1.73 SQ M-ARVRAT: >60 ML/MIN/{1.73_M2}
GLOBULIN UR ELPH-MCNC: 3.1 G/DL (ref 2–3.5)
GLUCOSE SERPL-MCNC: 86 MG/DL (ref 65–99)
HCT VFR BLD AUTO: 41.9 % (ref 37–47)
HGB BLD-MCNC: 13.5 G/DL (ref 12–16)
LYMPHOCYTES # BLD AUTO: 1.66 10*3/UL (ref 1–5)
LYMPHOCYTES NFR BLD AUTO: 27.8 % (ref 20–45)
MCH RBC QN AUTO: 29.8 PG (ref 27–31)
MCHC RBC AUTO-ENTMCNC: 32.2 G/DL (ref 33–37)
MCV RBC AUTO: 92.5 FL (ref 81–99)
MONOCYTES # BLD AUTO: 0.34 10*3/UL (ref 0.2–1.2)
MONOCYTES NFR BLD AUTO: 5.7 % (ref 3–10)
NEUTROPHILS # BLD AUTO: 3.78 10*3/UL (ref 2–8)
NEUTROPHILS NFR BLD AUTO: 63.3 % (ref 30–85)
NRBC CBCN: 0 % (ref 0–0.7)
OSMOLALITY SERPL CALC.SUM OF ELEC: 288 MOSM/KG (ref 273–304)
PLATELET # BLD AUTO: 338 10*3/UL (ref 130–400)
PMV BLD AUTO: 10.6 FL (ref 9.4–12.3)
POTASSIUM SERPL-SCNC: 4.1 MMOL/L (ref 3.5–5.3)
RBC # BLD AUTO: 4.53 10*6/UL (ref 4.2–5.4)
SODIUM SERPL-SCNC: 139 MMOL/L (ref 135–147)
T4 FREE SERPL-MCNC: 1.4 NG/DL (ref 0.9–1.8)
TSH SERPL-ACNC: 1.85 M[IU]/L (ref 0.27–4.2)
WBC # BLD AUTO: 5.97 10*3/UL (ref 4.8–10.8)

## 2020-04-13 ENCOUNTER — HOSPITAL ENCOUNTER (OUTPATIENT)
Dept: FAMILY MEDICINE CLINIC | Facility: CLINIC | Age: 55
Discharge: HOME | End: 2020-04-13
Attending: FAMILY MEDICINE

## 2020-04-13 LAB
ALBUMIN SERPL-MCNC: 4.2 G/DL (ref 3.5–5)
ALBUMIN/GLOB SERPL: 1.4 {RATIO} (ref 1.4–2.6)
ALP SERPL-CCNC: 171 U/L (ref 53–141)
ALT SERPL-CCNC: 31 U/L (ref 10–40)
ANION GAP SERPL CALC-SCNC: 16 MMOL/L (ref 8–19)
AST SERPL-CCNC: 28 U/L (ref 15–50)
BASOPHILS # BLD AUTO: 0.02 10*3/UL (ref 0–0.2)
BASOPHILS NFR BLD AUTO: 0.3 % (ref 0–3)
BILIRUB SERPL-MCNC: 0.16 MG/DL (ref 0.2–1.3)
BUN SERPL-MCNC: 19 MG/DL (ref 5–25)
BUN/CREAT SERPL: 16 {RATIO} (ref 6–20)
CALCIUM SERPL-MCNC: 9.1 MG/DL (ref 8.7–10.4)
CHLORIDE SERPL-SCNC: 102 MMOL/L (ref 99–111)
CONV ABS IMM GRAN: 0.01 10*3/UL (ref 0–0.2)
CONV CO2: 27 MMOL/L (ref 22–32)
CONV IMMATURE GRAN: 0.2 % (ref 0–1.8)
CONV TOTAL PROTEIN: 7.2 G/DL (ref 6.3–8.2)
CREAT UR-MCNC: 1.17 MG/DL (ref 0.5–0.9)
DEPRECATED RDW RBC AUTO: 44 FL (ref 36.4–46.3)
EOSINOPHIL # BLD AUTO: 0.09 10*3/UL (ref 0–0.7)
EOSINOPHIL # BLD AUTO: 1.5 % (ref 0–7)
ERYTHROCYTE [DISTWIDTH] IN BLOOD BY AUTOMATED COUNT: 12.9 % (ref 11.7–14.4)
GFR SERPLBLD BASED ON 1.73 SQ M-ARVRAT: 52 ML/MIN/{1.73_M2}
GLOBULIN UR ELPH-MCNC: 3 G/DL (ref 2–3.5)
GLUCOSE SERPL-MCNC: 76 MG/DL (ref 65–99)
HCT VFR BLD AUTO: 40 % (ref 37–47)
HGB BLD-MCNC: 12.9 G/DL (ref 12–16)
LYMPHOCYTES # BLD AUTO: 1.36 10*3/UL (ref 1–5)
LYMPHOCYTES NFR BLD AUTO: 22.7 % (ref 20–45)
MCH RBC QN AUTO: 29.9 PG (ref 27–31)
MCHC RBC AUTO-ENTMCNC: 32.3 G/DL (ref 33–37)
MCV RBC AUTO: 92.8 FL (ref 81–99)
MONOCYTES # BLD AUTO: 0.38 10*3/UL (ref 0.2–1.2)
MONOCYTES NFR BLD AUTO: 6.3 % (ref 3–10)
NEUTROPHILS # BLD AUTO: 4.13 10*3/UL (ref 2–8)
NEUTROPHILS NFR BLD AUTO: 69 % (ref 30–85)
NRBC CBCN: 0 % (ref 0–0.7)
OSMOLALITY SERPL CALC.SUM OF ELEC: 293 MOSM/KG (ref 273–304)
PLATELET # BLD AUTO: 349 10*3/UL (ref 130–400)
PMV BLD AUTO: 10.6 FL (ref 9.4–12.3)
POTASSIUM SERPL-SCNC: 4 MMOL/L (ref 3.5–5.3)
RBC # BLD AUTO: 4.31 10*6/UL (ref 4.2–5.4)
SODIUM SERPL-SCNC: 141 MMOL/L (ref 135–147)
WBC # BLD AUTO: 5.99 10*3/UL (ref 4.8–10.8)

## 2020-04-27 ENCOUNTER — HOSPITAL ENCOUNTER (OUTPATIENT)
Dept: FAMILY MEDICINE CLINIC | Facility: CLINIC | Age: 55
Discharge: HOME | End: 2020-04-27
Attending: FAMILY MEDICINE

## 2020-04-27 LAB
ALBUMIN SERPL-MCNC: 4.3 G/DL (ref 3.5–5)
ALBUMIN/GLOB SERPL: 1.5 {RATIO} (ref 1.4–2.6)
ALP SERPL-CCNC: 176 U/L (ref 53–141)
ALT SERPL-CCNC: 28 U/L (ref 10–40)
ANION GAP SERPL CALC-SCNC: 17 MMOL/L (ref 8–19)
AST SERPL-CCNC: 27 U/L (ref 15–50)
BASOPHILS # BLD AUTO: 0.02 10*3/UL (ref 0–0.2)
BASOPHILS # BLD: 1 % (ref 0–3)
BASOPHILS NFR BLD AUTO: 0.5 % (ref 0–3)
BILIRUB SERPL-MCNC: 0.17 MG/DL (ref 0.2–1.3)
BUN SERPL-MCNC: 17 MG/DL (ref 5–25)
BUN/CREAT SERPL: 22 {RATIO} (ref 6–20)
CALCIUM SERPL-MCNC: 9.4 MG/DL (ref 8.7–10.4)
CHLORIDE SERPL-SCNC: 102 MMOL/L (ref 99–111)
CONV ABS BANDS: 4 % (ref 1–5)
CONV ABS IMM GRAN: 0.02 10*3/UL (ref 0–0.2)
CONV ATYPICAL LYMPHOCYTES: 7 % (ref 0–5)
CONV CO2: 27 MMOL/L (ref 22–32)
CONV IMMATURE GRAN: 0.5 % (ref 0–1.8)
CONV SEGMENTED NEUTROPHILS: 58 % (ref 45–70)
CONV TOTAL PROTEIN: 7.2 G/DL (ref 6.3–8.2)
CREAT UR-MCNC: 0.79 MG/DL (ref 0.5–0.9)
DEPRECATED RDW RBC AUTO: 43.5 FL (ref 36.4–46.3)
EOSINOPHIL # BLD AUTO: 0.11 10*3/UL (ref 0–0.7)
EOSINOPHIL # BLD AUTO: 2.7 % (ref 0–7)
EOSINOPHIL NFR BLD AUTO: 2 % (ref 0–7)
ERYTHROCYTE [DISTWIDTH] IN BLOOD BY AUTOMATED COUNT: 12.7 % (ref 11.7–14.4)
GFR SERPLBLD BASED ON 1.73 SQ M-ARVRAT: >60 ML/MIN/{1.73_M2}
GLOBULIN UR ELPH-MCNC: 2.9 G/DL (ref 2–3.5)
GLUCOSE SERPL-MCNC: 87 MG/DL (ref 65–99)
HCT VFR BLD AUTO: 39.1 % (ref 37–47)
HGB BLD-MCNC: 12.4 G/DL (ref 12–16)
LYMPHOCYTES # BLD AUTO: 0.94 10*3/UL (ref 1–5)
LYMPHOCYTES NFR BLD AUTO: 23.4 % (ref 20–45)
MCH RBC QN AUTO: 29.4 PG (ref 27–31)
MCHC RBC AUTO-ENTMCNC: 31.7 G/DL (ref 33–37)
MCV RBC AUTO: 92.7 FL (ref 81–99)
MONOCYTES # BLD AUTO: 0.31 10*3/UL (ref 0.2–1.2)
MONOCYTES NFR BLD AUTO: 7.7 % (ref 3–10)
MONOCYTES NFR BLD MANUAL: 7 % (ref 3–10)
NEUTROPHILS # BLD AUTO: 2.62 10*3/UL (ref 2–8)
NEUTROPHILS NFR BLD AUTO: 65.2 % (ref 30–85)
NRBC CBCN: 0 % (ref 0–0.7)
NUC CELL # PRT MANUAL: 0 /100{WBCS}
OSMOLALITY SERPL CALC.SUM OF ELEC: 293 MOSM/KG (ref 273–304)
PLAT MORPH BLD: NORMAL
PLATELET # BLD AUTO: 333 10*3/UL (ref 130–400)
PMV BLD AUTO: 10.6 FL (ref 9.4–12.3)
POTASSIUM SERPL-SCNC: 4.5 MMOL/L (ref 3.5–5.3)
RBC # BLD AUTO: 4.22 10*6/UL (ref 4.2–5.4)
SMALL PLATELETS BLD QL SMEAR: ADEQUATE
SODIUM SERPL-SCNC: 141 MMOL/L (ref 135–147)
VARIANT LYMPHS NFR BLD MANUAL: 21 % (ref 20–45)
WBC # BLD AUTO: 4.02 10*3/UL (ref 4.8–10.8)

## 2020-05-04 ENCOUNTER — HOSPITAL ENCOUNTER (OUTPATIENT)
Dept: FAMILY MEDICINE CLINIC | Facility: CLINIC | Age: 55
Discharge: HOME | End: 2020-05-04
Attending: FAMILY MEDICINE

## 2020-05-04 LAB
BASOPHILS # BLD AUTO: 0.03 10*3/UL (ref 0–0.2)
BASOPHILS NFR BLD AUTO: 0.6 % (ref 0–3)
CONV ABS IMM GRAN: 0.01 10*3/UL (ref 0–0.2)
CONV IMMATURE GRAN: 0.2 % (ref 0–1.8)
DEPRECATED RDW RBC AUTO: 42.6 FL (ref 36.4–46.3)
EOSINOPHIL # BLD AUTO: 0.18 10*3/UL (ref 0–0.7)
EOSINOPHIL # BLD AUTO: 3.4 % (ref 0–7)
ERYTHROCYTE [DISTWIDTH] IN BLOOD BY AUTOMATED COUNT: 12.7 % (ref 11.7–14.4)
HCT VFR BLD AUTO: 39.1 % (ref 37–47)
HGB BLD-MCNC: 12.8 G/DL (ref 12–16)
LYMPHOCYTES # BLD AUTO: 1.56 10*3/UL (ref 1–5)
LYMPHOCYTES NFR BLD AUTO: 29.2 % (ref 20–45)
MCH RBC QN AUTO: 29.9 PG (ref 27–31)
MCHC RBC AUTO-ENTMCNC: 32.7 G/DL (ref 33–37)
MCV RBC AUTO: 91.4 FL (ref 81–99)
MONOCYTES # BLD AUTO: 0.37 10*3/UL (ref 0.2–1.2)
MONOCYTES NFR BLD AUTO: 6.9 % (ref 3–10)
NEUTROPHILS # BLD AUTO: 3.2 10*3/UL (ref 2–8)
NEUTROPHILS NFR BLD AUTO: 59.7 % (ref 30–85)
NRBC CBCN: 0 % (ref 0–0.7)
PLATELET # BLD AUTO: 350 10*3/UL (ref 130–400)
PMV BLD AUTO: 10.4 FL (ref 9.4–12.3)
RBC # BLD AUTO: 4.28 10*6/UL (ref 4.2–5.4)
WBC # BLD AUTO: 5.35 10*3/UL (ref 4.8–10.8)

## 2020-05-14 ENCOUNTER — HOSPITAL ENCOUNTER (OUTPATIENT)
Dept: FAMILY MEDICINE CLINIC | Facility: CLINIC | Age: 55
Discharge: HOME | End: 2020-05-14
Attending: FAMILY MEDICINE

## 2020-05-14 LAB
ALBUMIN SERPL-MCNC: 4.4 G/DL (ref 3.5–5)
ALBUMIN/GLOB SERPL: 1.5 {RATIO} (ref 1.4–2.6)
ALP SERPL-CCNC: 175 U/L (ref 53–141)
ALT SERPL-CCNC: 28 U/L (ref 10–40)
ANION GAP SERPL CALC-SCNC: 20 MMOL/L (ref 8–19)
AST SERPL-CCNC: 28 U/L (ref 15–50)
BASOPHILS # BLD AUTO: 0.03 10*3/UL (ref 0–0.2)
BASOPHILS NFR BLD AUTO: 0.5 % (ref 0–3)
BILIRUB SERPL-MCNC: 0.21 MG/DL (ref 0.2–1.3)
BUN SERPL-MCNC: 17 MG/DL (ref 5–25)
BUN/CREAT SERPL: 18 {RATIO} (ref 6–20)
CALCIUM SERPL-MCNC: 9.6 MG/DL (ref 8.7–10.4)
CHLORIDE SERPL-SCNC: 101 MMOL/L (ref 99–111)
CONV ABS IMM GRAN: 0.01 10*3/UL (ref 0–0.2)
CONV CO2: 24 MMOL/L (ref 22–32)
CONV IMMATURE GRAN: 0.2 % (ref 0–1.8)
CONV TOTAL PROTEIN: 7.4 G/DL (ref 6.3–8.2)
CREAT UR-MCNC: 0.96 MG/DL (ref 0.5–0.9)
DEPRECATED RDW RBC AUTO: 41.8 FL (ref 36.4–46.3)
EOSINOPHIL # BLD AUTO: 0.21 10*3/UL (ref 0–0.7)
EOSINOPHIL # BLD AUTO: 3.8 % (ref 0–7)
ERYTHROCYTE [DISTWIDTH] IN BLOOD BY AUTOMATED COUNT: 12.7 % (ref 11.7–14.4)
GFR SERPLBLD BASED ON 1.73 SQ M-ARVRAT: >60 ML/MIN/{1.73_M2}
GLOBULIN UR ELPH-MCNC: 3 G/DL (ref 2–3.5)
GLUCOSE SERPL-MCNC: 110 MG/DL (ref 65–99)
HCT VFR BLD AUTO: 39.9 % (ref 37–47)
HGB BLD-MCNC: 12.9 G/DL (ref 12–16)
LYMPHOCYTES # BLD AUTO: 1.52 10*3/UL (ref 1–5)
LYMPHOCYTES NFR BLD AUTO: 27.4 % (ref 20–45)
MCH RBC QN AUTO: 29.7 PG (ref 27–31)
MCHC RBC AUTO-ENTMCNC: 32.3 G/DL (ref 33–37)
MCV RBC AUTO: 91.9 FL (ref 81–99)
MONOCYTES # BLD AUTO: 0.34 10*3/UL (ref 0.2–1.2)
MONOCYTES NFR BLD AUTO: 6.1 % (ref 3–10)
NEUTROPHILS # BLD AUTO: 3.44 10*3/UL (ref 2–8)
NEUTROPHILS NFR BLD AUTO: 62 % (ref 30–85)
NRBC CBCN: 0 % (ref 0–0.7)
OSMOLALITY SERPL CALC.SUM OF ELEC: 294 MOSM/KG (ref 273–304)
PLATELET # BLD AUTO: 326 10*3/UL (ref 130–400)
PMV BLD AUTO: 10.4 FL (ref 9.4–12.3)
POTASSIUM SERPL-SCNC: 4.3 MMOL/L (ref 3.5–5.3)
RBC # BLD AUTO: 4.34 10*6/UL (ref 4.2–5.4)
SODIUM SERPL-SCNC: 141 MMOL/L (ref 135–147)
TSH SERPL-ACNC: 1.42 M[IU]/L (ref 0.27–4.2)
WBC # BLD AUTO: 5.55 10*3/UL (ref 4.8–10.8)

## 2020-05-27 ENCOUNTER — HOSPITAL ENCOUNTER (OUTPATIENT)
Dept: FAMILY MEDICINE CLINIC | Facility: CLINIC | Age: 55
Discharge: HOME | End: 2020-05-27
Attending: FAMILY MEDICINE

## 2020-05-27 LAB
ALBUMIN SERPL-MCNC: 4.5 G/DL (ref 3.5–5)
ALBUMIN/GLOB SERPL: 1.4 {RATIO} (ref 1.4–2.6)
ALP SERPL-CCNC: 172 U/L (ref 53–141)
ALT SERPL-CCNC: 25 U/L (ref 10–40)
ANION GAP SERPL CALC-SCNC: 21 MMOL/L (ref 8–19)
AST SERPL-CCNC: 28 U/L (ref 15–50)
BASOPHILS # BLD AUTO: 0.02 10*3/UL (ref 0–0.2)
BASOPHILS NFR BLD AUTO: 0.4 % (ref 0–3)
BILIRUB SERPL-MCNC: 0.21 MG/DL (ref 0.2–1.3)
BUN SERPL-MCNC: 18 MG/DL (ref 5–25)
BUN/CREAT SERPL: 21 {RATIO} (ref 6–20)
CALCIUM SERPL-MCNC: 9.8 MG/DL (ref 8.7–10.4)
CHLORIDE SERPL-SCNC: 99 MMOL/L (ref 99–111)
CONV ABS IMM GRAN: 0.01 10*3/UL (ref 0–0.2)
CONV CO2: 23 MMOL/L (ref 22–32)
CONV IMMATURE GRAN: 0.2 % (ref 0–1.8)
CONV TOTAL PROTEIN: 7.7 G/DL (ref 6.3–8.2)
CREAT UR-MCNC: 0.86 MG/DL (ref 0.5–0.9)
DEPRECATED RDW RBC AUTO: 42.5 FL (ref 36.4–46.3)
EOSINOPHIL # BLD AUTO: 0.39 10*3/UL (ref 0–0.7)
EOSINOPHIL # BLD AUTO: 7 % (ref 0–7)
ERYTHROCYTE [DISTWIDTH] IN BLOOD BY AUTOMATED COUNT: 12.8 % (ref 11.7–14.4)
GFR SERPLBLD BASED ON 1.73 SQ M-ARVRAT: >60 ML/MIN/{1.73_M2}
GLOBULIN UR ELPH-MCNC: 3.2 G/DL (ref 2–3.5)
GLUCOSE SERPL-MCNC: 90 MG/DL (ref 65–99)
HCT VFR BLD AUTO: 41.9 % (ref 37–47)
HGB BLD-MCNC: 13.4 G/DL (ref 12–16)
LYMPHOCYTES # BLD AUTO: 1.61 10*3/UL (ref 1–5)
LYMPHOCYTES NFR BLD AUTO: 28.8 % (ref 20–45)
MCH RBC QN AUTO: 29.2 PG (ref 27–31)
MCHC RBC AUTO-ENTMCNC: 32 G/DL (ref 33–37)
MCV RBC AUTO: 91.3 FL (ref 81–99)
MONOCYTES # BLD AUTO: 0.27 10*3/UL (ref 0.2–1.2)
MONOCYTES NFR BLD AUTO: 4.8 % (ref 3–10)
NEUTROPHILS # BLD AUTO: 3.3 10*3/UL (ref 2–8)
NEUTROPHILS NFR BLD AUTO: 58.8 % (ref 30–85)
NRBC CBCN: 0 % (ref 0–0.7)
OSMOLALITY SERPL CALC.SUM OF ELEC: 289 MOSM/KG (ref 273–304)
PLATELET # BLD AUTO: 370 10*3/UL (ref 130–400)
PMV BLD AUTO: 10.7 FL (ref 9.4–12.3)
POTASSIUM SERPL-SCNC: 4.2 MMOL/L (ref 3.5–5.3)
RBC # BLD AUTO: 4.59 10*6/UL (ref 4.2–5.4)
SODIUM SERPL-SCNC: 139 MMOL/L (ref 135–147)
WBC # BLD AUTO: 5.6 10*3/UL (ref 4.8–10.8)

## 2020-06-11 ENCOUNTER — HOSPITAL ENCOUNTER (OUTPATIENT)
Dept: FAMILY MEDICINE CLINIC | Facility: CLINIC | Age: 55
Discharge: HOME | End: 2020-06-11
Attending: FAMILY MEDICINE

## 2020-06-11 LAB
ALBUMIN SERPL-MCNC: 4.8 G/DL (ref 3.5–5)
ALBUMIN/GLOB SERPL: 1.6 {RATIO} (ref 1.4–2.6)
ALP SERPL-CCNC: 159 U/L (ref 53–141)
ALT SERPL-CCNC: 22 U/L (ref 10–40)
ANION GAP SERPL CALC-SCNC: 24 MMOL/L (ref 8–19)
AST SERPL-CCNC: 23 U/L (ref 15–50)
BILIRUB SERPL-MCNC: 0.28 MG/DL (ref 0.2–1.3)
BUN SERPL-MCNC: 18 MG/DL (ref 5–25)
BUN/CREAT SERPL: 18 {RATIO} (ref 6–20)
CALCIUM SERPL-MCNC: 10 MG/DL (ref 8.7–10.4)
CHLORIDE SERPL-SCNC: 101 MMOL/L (ref 99–111)
CONV CO2: 23 MMOL/L (ref 22–32)
CONV TOTAL PROTEIN: 7.8 G/DL (ref 6.3–8.2)
CREAT UR-MCNC: 1 MG/DL (ref 0.5–0.9)
GFR SERPLBLD BASED ON 1.73 SQ M-ARVRAT: >60 ML/MIN/{1.73_M2}
GLOBULIN UR ELPH-MCNC: 3 G/DL (ref 2–3.5)
GLUCOSE SERPL-MCNC: 91 MG/DL (ref 65–99)
OSMOLALITY SERPL CALC.SUM OF ELEC: 299 MOSM/KG (ref 273–304)
POTASSIUM SERPL-SCNC: 4.3 MMOL/L (ref 3.5–5.3)
SODIUM SERPL-SCNC: 144 MMOL/L (ref 135–147)

## 2020-06-25 ENCOUNTER — HOSPITAL ENCOUNTER (OUTPATIENT)
Dept: FAMILY MEDICINE CLINIC | Facility: CLINIC | Age: 55
Discharge: HOME | End: 2020-06-25
Attending: FAMILY MEDICINE

## 2020-06-25 LAB
ALBUMIN SERPL-MCNC: 4.2 G/DL (ref 3.5–5)
ALBUMIN/GLOB SERPL: 1.6 {RATIO} (ref 1.4–2.6)
ALP SERPL-CCNC: 145 U/L (ref 53–141)
ALT SERPL-CCNC: 24 U/L (ref 10–40)
ANION GAP SERPL CALC-SCNC: 17 MMOL/L (ref 8–19)
AST SERPL-CCNC: 22 U/L (ref 15–50)
BILIRUB SERPL-MCNC: 0.26 MG/DL (ref 0.2–1.3)
BUN SERPL-MCNC: 27 MG/DL (ref 5–25)
BUN/CREAT SERPL: 28 {RATIO} (ref 6–20)
CALCIUM SERPL-MCNC: 9.5 MG/DL (ref 8.7–10.4)
CHLORIDE SERPL-SCNC: 101 MMOL/L (ref 99–111)
CONV CO2: 22 MMOL/L (ref 22–32)
CONV TOTAL PROTEIN: 6.9 G/DL (ref 6.3–8.2)
CREAT UR-MCNC: 0.95 MG/DL (ref 0.5–0.9)
GFR SERPLBLD BASED ON 1.73 SQ M-ARVRAT: >60 ML/MIN/{1.73_M2}
GLOBULIN UR ELPH-MCNC: 2.7 G/DL (ref 2–3.5)
GLUCOSE SERPL-MCNC: 96 MG/DL (ref 65–99)
OSMOLALITY SERPL CALC.SUM OF ELEC: 287 MOSM/KG (ref 273–304)
POTASSIUM SERPL-SCNC: 4.4 MMOL/L (ref 3.5–5.3)
SODIUM SERPL-SCNC: 136 MMOL/L (ref 135–147)

## 2020-07-09 ENCOUNTER — HOSPITAL ENCOUNTER (OUTPATIENT)
Dept: FAMILY MEDICINE CLINIC | Facility: CLINIC | Age: 55
Discharge: HOME | End: 2020-07-09
Attending: FAMILY MEDICINE

## 2020-07-09 LAB
ALBUMIN SERPL-MCNC: 4.3 G/DL (ref 3.5–5)
ALBUMIN/GLOB SERPL: 1.7 {RATIO} (ref 1.4–2.6)
ALP SERPL-CCNC: 148 U/L (ref 53–141)
ALT SERPL-CCNC: 27 U/L (ref 10–40)
ANION GAP SERPL CALC-SCNC: 15 MMOL/L (ref 8–19)
AST SERPL-CCNC: 27 U/L (ref 15–50)
BILIRUB SERPL-MCNC: 0.22 MG/DL (ref 0.2–1.3)
BUN SERPL-MCNC: 19 MG/DL (ref 5–25)
BUN/CREAT SERPL: 22 {RATIO} (ref 6–20)
CALCIUM SERPL-MCNC: 9.1 MG/DL (ref 8.7–10.4)
CHLORIDE SERPL-SCNC: 101 MMOL/L (ref 99–111)
CONV CO2: 25 MMOL/L (ref 22–32)
CONV TOTAL PROTEIN: 6.9 G/DL (ref 6.3–8.2)
CREAT UR-MCNC: 0.87 MG/DL (ref 0.5–0.9)
GFR SERPLBLD BASED ON 1.73 SQ M-ARVRAT: >60 ML/MIN/{1.73_M2}
GLOBULIN UR ELPH-MCNC: 2.6 G/DL (ref 2–3.5)
GLUCOSE SERPL-MCNC: 88 MG/DL (ref 65–99)
OSMOLALITY SERPL CALC.SUM OF ELEC: 286 MOSM/KG (ref 273–304)
POTASSIUM SERPL-SCNC: 3.8 MMOL/L (ref 3.5–5.3)
SODIUM SERPL-SCNC: 137 MMOL/L (ref 135–147)

## 2020-07-31 ENCOUNTER — HOSPITAL ENCOUNTER (OUTPATIENT)
Dept: LAB | Facility: HOSPITAL | Age: 55
Discharge: HOME | End: 2020-07-31

## 2020-07-31 LAB
ALBUMIN SERPL-MCNC: 3.9 G/DL (ref 3.5–5)
ALBUMIN/GLOB SERPL: 1.4 {RATIO} (ref 1.4–2.6)
ALP SERPL-CCNC: 150 U/L (ref 53–141)
ALT SERPL-CCNC: 25 U/L (ref 10–40)
ANION GAP SERPL CALC-SCNC: 20 MMOL/L (ref 8–19)
AST SERPL-CCNC: 23 U/L (ref 15–50)
BASOPHILS # BLD AUTO: 0.04 10*3/UL (ref 0–0.2)
BASOPHILS NFR BLD AUTO: 0.6 % (ref 0–3)
BILIRUB SERPL-MCNC: 0.22 MG/DL (ref 0.2–1.3)
BUN SERPL-MCNC: 15 MG/DL (ref 5–25)
BUN/CREAT SERPL: 15 {RATIO} (ref 6–20)
CALCIUM SERPL-MCNC: 10 MG/DL (ref 8.7–10.4)
CHLORIDE SERPL-SCNC: 103 MMOL/L (ref 99–111)
CHOLEST SERPL-MCNC: 203 MG/DL (ref 107–200)
CHOLEST/HDLC SERPL: 3.3 {RATIO} (ref 3–6)
CONV ABS IMM GRAN: 0.01 10*3/UL (ref 0–0.2)
CONV CO2: 25 MMOL/L (ref 22–32)
CONV IMMATURE GRAN: 0.2 % (ref 0–1.8)
CONV TOTAL PROTEIN: 6.6 G/DL (ref 6.3–8.2)
CREAT UR-MCNC: 1.03 MG/DL (ref 0.5–0.9)
DEPRECATED RDW RBC AUTO: 44.8 FL (ref 36.4–46.3)
EOSINOPHIL # BLD AUTO: 0.7 10*3/UL (ref 0–0.7)
EOSINOPHIL # BLD AUTO: 11.2 % (ref 0–7)
ERYTHROCYTE [DISTWIDTH] IN BLOOD BY AUTOMATED COUNT: 13.2 % (ref 11.7–14.4)
GFR SERPLBLD BASED ON 1.73 SQ M-ARVRAT: >60 ML/MIN/{1.73_M2}
GLOBULIN UR ELPH-MCNC: 2.7 G/DL (ref 2–3.5)
GLUCOSE SERPL-MCNC: 92 MG/DL (ref 65–99)
HCT VFR BLD AUTO: 38.8 % (ref 37–47)
HDLC SERPL-MCNC: 61 MG/DL (ref 40–60)
HGB BLD-MCNC: 12.4 G/DL (ref 12–16)
LDLC SERPL CALC-MCNC: 129 MG/DL (ref 70–100)
LYMPHOCYTES # BLD AUTO: 1.65 10*3/UL (ref 1–5)
LYMPHOCYTES NFR BLD AUTO: 26.4 % (ref 20–45)
MCH RBC QN AUTO: 29.2 PG (ref 27–31)
MCHC RBC AUTO-ENTMCNC: 32 G/DL (ref 33–37)
MCV RBC AUTO: 91.5 FL (ref 81–99)
MONOCYTES # BLD AUTO: 0.49 10*3/UL (ref 0.2–1.2)
MONOCYTES NFR BLD AUTO: 7.9 % (ref 3–10)
NEUTROPHILS # BLD AUTO: 3.35 10*3/UL (ref 2–8)
NEUTROPHILS NFR BLD AUTO: 53.7 % (ref 30–85)
NRBC CBCN: 0 % (ref 0–0.7)
OSMOLALITY SERPL CALC.SUM OF ELEC: 296 MOSM/KG (ref 273–304)
PLATELET # BLD AUTO: 332 10*3/UL (ref 130–400)
PMV BLD AUTO: 10.2 FL (ref 9.4–12.3)
POTASSIUM SERPL-SCNC: 4.5 MMOL/L (ref 3.5–5.3)
RBC # BLD AUTO: 4.24 10*6/UL (ref 4.2–5.4)
SODIUM SERPL-SCNC: 143 MMOL/L (ref 135–147)
TRIGL SERPL-MCNC: 64 MG/DL (ref 40–150)
TSH SERPL-ACNC: 3.33 M[IU]/L (ref 0.27–4.2)
VLDLC SERPL-MCNC: 13 MG/DL (ref 5–37)
WBC # BLD AUTO: 6.24 10*3/UL (ref 4.8–10.8)

## 2020-09-10 ENCOUNTER — HOSPITAL ENCOUNTER (OUTPATIENT)
Dept: FAMILY MEDICINE CLINIC | Facility: CLINIC | Age: 55
Discharge: HOME | End: 2020-09-10
Attending: FAMILY MEDICINE

## 2020-09-11 LAB
ALBUMIN SERPL-MCNC: 4.3 G/DL (ref 3.5–5)
ALBUMIN/GLOB SERPL: 1.6 {RATIO} (ref 1.4–2.6)
ALP SERPL-CCNC: 155 U/L (ref 53–141)
ALT SERPL-CCNC: 24 U/L (ref 10–40)
ANION GAP SERPL CALC-SCNC: 17 MMOL/L (ref 8–19)
AST SERPL-CCNC: 28 U/L (ref 15–50)
BASOPHILS # BLD AUTO: 0.02 10*3/UL (ref 0–0.2)
BASOPHILS NFR BLD AUTO: 0.3 % (ref 0–3)
BILIRUB SERPL-MCNC: 0.19 MG/DL (ref 0.2–1.3)
BUN SERPL-MCNC: 18 MG/DL (ref 5–25)
BUN/CREAT SERPL: 16 {RATIO} (ref 6–20)
CALCIUM SERPL-MCNC: 9.1 MG/DL (ref 8.7–10.4)
CHLORIDE SERPL-SCNC: 99 MMOL/L (ref 99–111)
CONV ABS IMM GRAN: 0.01 10*3/UL (ref 0–0.2)
CONV CO2: 25 MMOL/L (ref 22–32)
CONV IMMATURE GRAN: 0.1 % (ref 0–1.8)
CONV TOTAL PROTEIN: 7 G/DL (ref 6.3–8.2)
CREAT UR-MCNC: 1.13 MG/DL (ref 0.5–0.9)
DEPRECATED RDW RBC AUTO: 45.1 FL (ref 36.4–46.3)
EOSINOPHIL # BLD AUTO: 0.38 10*3/UL (ref 0–0.7)
EOSINOPHIL # BLD AUTO: 5.7 % (ref 0–7)
ERYTHROCYTE [DISTWIDTH] IN BLOOD BY AUTOMATED COUNT: 13.2 % (ref 11.7–14.4)
GFR SERPLBLD BASED ON 1.73 SQ M-ARVRAT: 54 ML/MIN/{1.73_M2}
GLOBULIN UR ELPH-MCNC: 2.7 G/DL (ref 2–3.5)
GLUCOSE SERPL-MCNC: 90 MG/DL (ref 65–99)
HCT VFR BLD AUTO: 40.3 % (ref 37–47)
HGB BLD-MCNC: 12.7 G/DL (ref 12–16)
LYMPHOCYTES # BLD AUTO: 1.92 10*3/UL (ref 1–5)
LYMPHOCYTES NFR BLD AUTO: 28.6 % (ref 20–45)
MCH RBC QN AUTO: 29.1 PG (ref 27–31)
MCHC RBC AUTO-ENTMCNC: 31.5 G/DL (ref 33–37)
MCV RBC AUTO: 92.4 FL (ref 81–99)
MONOCYTES # BLD AUTO: 0.5 10*3/UL (ref 0.2–1.2)
MONOCYTES NFR BLD AUTO: 7.4 % (ref 3–10)
NEUTROPHILS # BLD AUTO: 3.89 10*3/UL (ref 2–8)
NEUTROPHILS NFR BLD AUTO: 57.9 % (ref 30–85)
NRBC CBCN: 0 % (ref 0–0.7)
OSMOLALITY SERPL CALC.SUM OF ELEC: 285 MOSM/KG (ref 273–304)
PLATELET # BLD AUTO: 302 10*3/UL (ref 130–400)
PMV BLD AUTO: 12 FL (ref 9.4–12.3)
POTASSIUM SERPL-SCNC: 4.4 MMOL/L (ref 3.5–5.3)
RBC # BLD AUTO: 4.36 10*6/UL (ref 4.2–5.4)
SODIUM SERPL-SCNC: 137 MMOL/L (ref 135–147)
TSH SERPL-ACNC: 2.31 M[IU]/L (ref 0.27–4.2)
WBC # BLD AUTO: 6.72 10*3/UL (ref 4.8–10.8)

## 2020-09-12 LAB — 25(OH)D3 SERPL-MCNC: 31.7 NG/ML (ref 30–100)

## 2020-09-28 ENCOUNTER — HOSPITAL ENCOUNTER (OUTPATIENT)
Dept: FAMILY MEDICINE CLINIC | Facility: CLINIC | Age: 55
Discharge: HOME | End: 2020-09-28
Attending: FAMILY MEDICINE

## 2020-09-28 LAB
ALBUMIN SERPL-MCNC: 4.2 G/DL (ref 3.5–5)
ALBUMIN/GLOB SERPL: 1.4 {RATIO} (ref 1.4–2.6)
ALP SERPL-CCNC: 160 U/L (ref 53–141)
ALT SERPL-CCNC: 20 U/L (ref 10–40)
ANION GAP SERPL CALC-SCNC: 17 MMOL/L (ref 8–19)
AST SERPL-CCNC: 28 U/L (ref 15–50)
BILIRUB SERPL-MCNC: 0.21 MG/DL (ref 0.2–1.3)
BUN SERPL-MCNC: 21 MG/DL (ref 5–25)
BUN/CREAT SERPL: 23 {RATIO} (ref 6–20)
CALCIUM SERPL-MCNC: 9.4 MG/DL (ref 8.7–10.4)
CHLORIDE SERPL-SCNC: 103 MMOL/L (ref 99–111)
CONV CO2: 26 MMOL/L (ref 22–32)
CONV TOTAL PROTEIN: 7.1 G/DL (ref 6.3–8.2)
CREAT UR-MCNC: 0.91 MG/DL (ref 0.5–0.9)
GFR SERPLBLD BASED ON 1.73 SQ M-ARVRAT: >60 ML/MIN/{1.73_M2}
GLOBULIN UR ELPH-MCNC: 2.9 G/DL (ref 2–3.5)
GLUCOSE SERPL-MCNC: 95 MG/DL (ref 65–99)
MAGNESIUM SERPL-MCNC: 2.14 MG/DL (ref 1.6–2.3)
OSMOLALITY SERPL CALC.SUM OF ELEC: 295 MOSM/KG (ref 273–304)
POTASSIUM SERPL-SCNC: 4.5 MMOL/L (ref 3.5–5.3)
SODIUM SERPL-SCNC: 141 MMOL/L (ref 135–147)

## 2020-10-14 ENCOUNTER — HOSPITAL ENCOUNTER (OUTPATIENT)
Dept: FAMILY MEDICINE CLINIC | Facility: CLINIC | Age: 55
Discharge: HOME | End: 2020-10-14
Attending: FAMILY MEDICINE

## 2020-10-14 LAB — SARS-COV-2 RNA SPEC QL NAA+PROBE: NOT DETECTED

## 2020-10-22 ENCOUNTER — HOSPITAL ENCOUNTER (OUTPATIENT)
Dept: FAMILY MEDICINE CLINIC | Facility: CLINIC | Age: 55
Discharge: HOME | End: 2020-10-22
Attending: FAMILY MEDICINE

## 2020-10-22 LAB
ALBUMIN SERPL-MCNC: 4.5 G/DL (ref 3.5–5)
ALBUMIN/GLOB SERPL: 1.5 {RATIO} (ref 1.4–2.6)
ALP SERPL-CCNC: 172 U/L (ref 53–141)
ALT SERPL-CCNC: 23 U/L (ref 10–40)
ANION GAP SERPL CALC-SCNC: 16 MMOL/L (ref 8–19)
AST SERPL-CCNC: 26 U/L (ref 15–50)
BASOPHILS # BLD AUTO: 0.03 10*3/UL (ref 0–0.2)
BASOPHILS NFR BLD AUTO: 0.6 % (ref 0–3)
BILIRUB SERPL-MCNC: 0.19 MG/DL (ref 0.2–1.3)
BUN SERPL-MCNC: 17 MG/DL (ref 5–25)
BUN/CREAT SERPL: 17 {RATIO} (ref 6–20)
CALCIUM SERPL-MCNC: 10.1 MG/DL (ref 8.7–10.4)
CHLORIDE SERPL-SCNC: 99 MMOL/L (ref 99–111)
CONV ABS IMM GRAN: 0.01 10*3/UL (ref 0–0.2)
CONV CO2: 29 MMOL/L (ref 22–32)
CONV IMMATURE GRAN: 0.2 % (ref 0–1.8)
CONV TOTAL PROTEIN: 7.5 G/DL (ref 6.3–8.2)
CREAT UR-MCNC: 0.99 MG/DL (ref 0.5–0.9)
DEPRECATED RDW RBC AUTO: 42.5 FL (ref 36.4–46.3)
EOSINOPHIL # BLD AUTO: 0.46 10*3/UL (ref 0–0.7)
EOSINOPHIL # BLD AUTO: 9.6 % (ref 0–7)
ERYTHROCYTE [DISTWIDTH] IN BLOOD BY AUTOMATED COUNT: 12.9 % (ref 11.7–14.4)
FOLATE SERPL-MCNC: 3.6 NG/ML (ref 4.8–20)
GFR SERPLBLD BASED ON 1.73 SQ M-ARVRAT: >60 ML/MIN/{1.73_M2}
GLOBULIN UR ELPH-MCNC: 3 G/DL (ref 2–3.5)
GLUCOSE SERPL-MCNC: 87 MG/DL (ref 65–99)
HCT VFR BLD AUTO: 38.8 % (ref 37–47)
HGB BLD-MCNC: 12.6 G/DL (ref 12–16)
LYMPHOCYTES # BLD AUTO: 1.26 10*3/UL (ref 1–5)
LYMPHOCYTES NFR BLD AUTO: 26.3 % (ref 20–45)
MCH RBC QN AUTO: 29 PG (ref 27–31)
MCHC RBC AUTO-ENTMCNC: 32.5 G/DL (ref 33–37)
MCV RBC AUTO: 89.2 FL (ref 81–99)
MONOCYTES # BLD AUTO: 0.34 10*3/UL (ref 0.2–1.2)
MONOCYTES NFR BLD AUTO: 7.1 % (ref 3–10)
NEUTROPHILS # BLD AUTO: 2.69 10*3/UL (ref 2–8)
NEUTROPHILS NFR BLD AUTO: 56.2 % (ref 30–85)
NRBC CBCN: 0 % (ref 0–0.7)
OSMOLALITY SERPL CALC.SUM OF ELEC: 291 MOSM/KG (ref 273–304)
PLATELET # BLD AUTO: 324 10*3/UL (ref 130–400)
PMV BLD AUTO: 10.2 FL (ref 9.4–12.3)
POTASSIUM SERPL-SCNC: 3.7 MMOL/L (ref 3.5–5.3)
RBC # BLD AUTO: 4.35 10*6/UL (ref 4.2–5.4)
SODIUM SERPL-SCNC: 140 MMOL/L (ref 135–147)
T4 FREE SERPL-MCNC: 1.3 NG/DL (ref 0.9–1.8)
TSH SERPL-ACNC: 1.68 M[IU]/L (ref 0.27–4.2)
VIT B12 SERPL-MCNC: 527 PG/ML (ref 211–911)
WBC # BLD AUTO: 4.79 10*3/UL (ref 4.8–10.8)

## 2020-10-26 LAB
CONV EBV EARLY ANTIGEN: 35.9 U/ML (ref 0–10.9)
CONV EBV NUCLEAR ANTIGEN: >600 U/ML (ref 0–21.9)
CONV EPSTEIN BARR VIRAL CAPSID IGM: <10 U/ML (ref 0–43.9)
CONV EPSTEIN BARR VIRUS ANTIBODY TO VIRAL CAPSID IGG: >750 U/ML (ref 0–21.9)

## 2020-11-02 ENCOUNTER — HOSPITAL ENCOUNTER (OUTPATIENT)
Dept: OTHER | Facility: HOSPITAL | Age: 55
Discharge: HOME | End: 2020-11-02
Attending: OTOLARYNGOLOGY

## 2020-11-11 ENCOUNTER — HOSPITAL ENCOUNTER (OUTPATIENT)
Dept: FAMILY MEDICINE CLINIC | Facility: CLINIC | Age: 55
Discharge: HOME | End: 2020-11-11
Attending: FAMILY MEDICINE

## 2020-11-11 LAB
ALBUMIN SERPL-MCNC: 4.3 G/DL (ref 3.5–5)
ALBUMIN/GLOB SERPL: 1.6 {RATIO} (ref 1.4–2.6)
ALP SERPL-CCNC: 154 U/L (ref 53–141)
ALT SERPL-CCNC: 20 U/L (ref 10–40)
ANION GAP SERPL CALC-SCNC: 17 MMOL/L (ref 8–19)
AST SERPL-CCNC: 23 U/L (ref 15–50)
BILIRUB SERPL-MCNC: 0.16 MG/DL (ref 0.2–1.3)
BUN SERPL-MCNC: 20 MG/DL (ref 5–25)
BUN/CREAT SERPL: 21 {RATIO} (ref 6–20)
CALCIUM SERPL-MCNC: 9.2 MG/DL (ref 8.7–10.4)
CHLORIDE SERPL-SCNC: 100 MMOL/L (ref 99–111)
CONV CO2: 27 MMOL/L (ref 22–32)
CONV TOTAL PROTEIN: 7 G/DL (ref 6.3–8.2)
CREAT UR-MCNC: 0.94 MG/DL (ref 0.5–0.9)
GFR SERPLBLD BASED ON 1.73 SQ M-ARVRAT: >60 ML/MIN/{1.73_M2}
GLOBULIN UR ELPH-MCNC: 2.7 G/DL (ref 2–3.5)
GLUCOSE SERPL-MCNC: 90 MG/DL (ref 65–99)
OSMOLALITY SERPL CALC.SUM OF ELEC: 292 MOSM/KG (ref 273–304)
POTASSIUM SERPL-SCNC: 4.1 MMOL/L (ref 3.5–5.3)
SODIUM SERPL-SCNC: 140 MMOL/L (ref 135–147)

## 2020-11-12 ENCOUNTER — OFFICE VISIT CONVERTED (OUTPATIENT)
Dept: OTOLARYNGOLOGY | Facility: CLINIC | Age: 55
End: 2020-11-12
Attending: OTOLARYNGOLOGY

## 2020-12-01 ENCOUNTER — HOSPITAL ENCOUNTER (OUTPATIENT)
Dept: OTHER | Facility: HOSPITAL | Age: 55
Discharge: HOME | End: 2020-12-01
Attending: NURSE PRACTITIONER

## 2020-12-18 ENCOUNTER — OFFICE VISIT CONVERTED (OUTPATIENT)
Dept: FAMILY MEDICINE CLINIC | Facility: CLINIC | Age: 55
End: 2020-12-18
Attending: NURSE PRACTITIONER

## 2020-12-18 ENCOUNTER — HOSPITAL ENCOUNTER (OUTPATIENT)
Dept: GENERAL RADIOLOGY | Facility: HOSPITAL | Age: 55
Discharge: HOME | End: 2020-12-18
Attending: NURSE PRACTITIONER

## 2020-12-18 ENCOUNTER — HOSPITAL ENCOUNTER (OUTPATIENT)
Dept: FAMILY MEDICINE CLINIC | Facility: CLINIC | Age: 55
Discharge: HOME | End: 2020-12-18
Attending: NURSE PRACTITIONER

## 2020-12-18 LAB
25(OH)D3 SERPL-MCNC: 23.7 NG/ML (ref 30–100)
ALBUMIN SERPL-MCNC: 4.2 G/DL (ref 3.5–5)
ALBUMIN/GLOB SERPL: 1.6 {RATIO} (ref 1.4–2.6)
ALP SERPL-CCNC: 159 U/L (ref 53–141)
ALT SERPL-CCNC: 22 U/L (ref 10–40)
ANION GAP SERPL CALC-SCNC: 14 MMOL/L (ref 8–19)
AST SERPL-CCNC: 24 U/L (ref 15–50)
BASOPHILS # BLD AUTO: 0.04 10*3/UL (ref 0–0.2)
BASOPHILS NFR BLD AUTO: 0.8 % (ref 0–3)
BILIRUB SERPL-MCNC: 0.24 MG/DL (ref 0.2–1.3)
BUN SERPL-MCNC: 13 MG/DL (ref 5–25)
BUN/CREAT SERPL: 16 {RATIO} (ref 6–20)
CALCIUM SERPL-MCNC: 9.5 MG/DL (ref 8.7–10.4)
CHLORIDE SERPL-SCNC: 104 MMOL/L (ref 99–111)
CONV ABS IMM GRAN: 0.01 10*3/UL (ref 0–0.2)
CONV CO2: 28 MMOL/L (ref 22–32)
CONV IMMATURE GRAN: 0.2 % (ref 0–1.8)
CONV TOTAL PROTEIN: 6.9 G/DL (ref 6.3–8.2)
CREAT UR-MCNC: 0.81 MG/DL (ref 0.5–0.9)
DEPRECATED RDW RBC AUTO: 42 FL (ref 36.4–46.3)
EOSINOPHIL # BLD AUTO: 0.46 10*3/UL (ref 0–0.7)
EOSINOPHIL # BLD AUTO: 9.3 % (ref 0–7)
ERYTHROCYTE [DISTWIDTH] IN BLOOD BY AUTOMATED COUNT: 13 % (ref 11.7–14.4)
FOLATE SERPL-MCNC: 13.9 NG/ML (ref 4.8–20)
GFR SERPLBLD BASED ON 1.73 SQ M-ARVRAT: >60 ML/MIN/{1.73_M2}
GLOBULIN UR ELPH-MCNC: 2.7 G/DL (ref 2–3.5)
GLUCOSE SERPL-MCNC: 87 MG/DL (ref 65–99)
HCT VFR BLD AUTO: 39.6 % (ref 37–47)
HGB BLD-MCNC: 12.8 G/DL (ref 12–16)
LYMPHOCYTES # BLD AUTO: 1.59 10*3/UL (ref 1–5)
LYMPHOCYTES NFR BLD AUTO: 32.3 % (ref 20–45)
MCH RBC QN AUTO: 28.6 PG (ref 27–31)
MCHC RBC AUTO-ENTMCNC: 32.3 G/DL (ref 33–37)
MCV RBC AUTO: 88.6 FL (ref 81–99)
MONOCYTES # BLD AUTO: 0.3 10*3/UL (ref 0.2–1.2)
MONOCYTES NFR BLD AUTO: 6.1 % (ref 3–10)
NEUTROPHILS # BLD AUTO: 2.53 10*3/UL (ref 2–8)
NEUTROPHILS NFR BLD AUTO: 51.3 % (ref 30–85)
NRBC CBCN: 0 % (ref 0–0.7)
OSMOLALITY SERPL CALC.SUM OF ELEC: 293 MOSM/KG (ref 273–304)
PLATELET # BLD AUTO: 322 10*3/UL (ref 130–400)
PMV BLD AUTO: 10.9 FL (ref 9.4–12.3)
POTASSIUM SERPL-SCNC: 4.4 MMOL/L (ref 3.5–5.3)
RBC # BLD AUTO: 4.47 10*6/UL (ref 4.2–5.4)
SODIUM SERPL-SCNC: 142 MMOL/L (ref 135–147)
TSH SERPL-ACNC: 1.71 M[IU]/L (ref 0.27–4.2)
WBC # BLD AUTO: 4.93 10*3/UL (ref 4.8–10.8)

## 2020-12-28 ENCOUNTER — CONVERSION ENCOUNTER (OUTPATIENT)
Dept: FAMILY MEDICINE CLINIC | Facility: CLINIC | Age: 55
End: 2020-12-28

## 2021-01-04 ENCOUNTER — CONVERSION ENCOUNTER (OUTPATIENT)
Dept: FAMILY MEDICINE CLINIC | Facility: CLINIC | Age: 56
End: 2021-01-04

## 2021-01-06 ENCOUNTER — CONVERSION ENCOUNTER (OUTPATIENT)
Dept: FAMILY MEDICINE CLINIC | Facility: CLINIC | Age: 56
End: 2021-01-06

## 2021-01-11 ENCOUNTER — HOSPITAL ENCOUNTER (OUTPATIENT)
Dept: GASTROENTEROLOGY | Facility: HOSPITAL | Age: 56
Discharge: HOME | End: 2021-01-11
Attending: FAMILY MEDICINE

## 2021-02-02 ENCOUNTER — HOSPITAL ENCOUNTER (OUTPATIENT)
Dept: FAMILY MEDICINE CLINIC | Facility: CLINIC | Age: 56
Discharge: HOME | End: 2021-02-02
Attending: FAMILY MEDICINE

## 2021-02-02 LAB
ALBUMIN SERPL-MCNC: 4.1 G/DL (ref 3.5–5)
ALBUMIN/GLOB SERPL: 1.4 {RATIO} (ref 1.4–2.6)
ALP SERPL-CCNC: 142 U/L (ref 53–141)
ALT SERPL-CCNC: 23 U/L (ref 10–40)
ANION GAP SERPL CALC-SCNC: 13 MMOL/L (ref 8–19)
AST SERPL-CCNC: 24 U/L (ref 15–50)
BASOPHILS # BLD AUTO: 0.02 10*3/UL (ref 0–0.2)
BASOPHILS # BLD: 0 % (ref 0–3)
BASOPHILS NFR BLD AUTO: 0.4 % (ref 0–3)
BILIRUB SERPL-MCNC: 0.24 MG/DL (ref 0.2–1.3)
BUN SERPL-MCNC: 28 MG/DL (ref 5–25)
BUN/CREAT SERPL: 31 {RATIO} (ref 6–20)
CALCIUM SERPL-MCNC: 9.3 MG/DL (ref 8.7–10.4)
CHLORIDE SERPL-SCNC: 98 MMOL/L (ref 99–111)
CONV ABS BANDS: 0 % (ref 1–5)
CONV ABS IMM GRAN: 0.01 10*3/UL (ref 0–0.2)
CONV ATYPICAL LYMPHOCYTES: 9 % (ref 0–5)
CONV CO2: 26 MMOL/L (ref 22–32)
CONV HYPOCHROMIA IN BLOOD BY LIGHT MICROSCOPY: ABNORMAL
CONV IMMATURE GRAN: 0.2 % (ref 0–1.8)
CONV SEGMENTED NEUTROPHILS: 51 % (ref 45–70)
CONV TOTAL PROTEIN: 7 G/DL (ref 6.3–8.2)
CREAT UR-MCNC: 0.89 MG/DL (ref 0.5–0.9)
DEPRECATED RDW RBC AUTO: 45.1 FL (ref 36.4–46.3)
EOSINOPHIL # BLD AUTO: 0.32 10*3/UL (ref 0–0.7)
EOSINOPHIL # BLD AUTO: 6.9 % (ref 0–7)
EOSINOPHIL NFR BLD AUTO: 4 % (ref 0–7)
ERYTHROCYTE [DISTWIDTH] IN BLOOD BY AUTOMATED COUNT: 13.6 % (ref 11.7–14.4)
FOLATE SERPL-MCNC: 18 NG/ML (ref 4.8–20)
GFR SERPLBLD BASED ON 1.73 SQ M-ARVRAT: >60 ML/MIN/{1.73_M2}
GIANT PLATELETS: ABNORMAL
GLOBULIN UR ELPH-MCNC: 2.9 G/DL (ref 2–3.5)
GLUCOSE SERPL-MCNC: 82 MG/DL (ref 65–99)
HCT VFR BLD AUTO: 39.1 % (ref 37–47)
HGB BLD-MCNC: 12.4 G/DL (ref 12–16)
LARGE PLATELETS: ABNORMAL
LYMPHOCYTES # BLD AUTO: 1.51 10*3/UL (ref 1–5)
LYMPHOCYTES NFR BLD AUTO: 32.4 % (ref 20–45)
MCH RBC QN AUTO: 28.6 PG (ref 27–31)
MCHC RBC AUTO-ENTMCNC: 31.7 G/DL (ref 33–37)
MCV RBC AUTO: 90.1 FL (ref 81–99)
MONOCYTES # BLD AUTO: 0.43 10*3/UL (ref 0.2–1.2)
MONOCYTES NFR BLD AUTO: 9.2 % (ref 3–10)
MONOCYTES NFR BLD MANUAL: 14 % (ref 3–10)
NEUTROPHILS # BLD AUTO: 2.37 10*3/UL (ref 2–8)
NEUTROPHILS NFR BLD AUTO: 50.9 % (ref 30–85)
NRBC CBCN: 0 % (ref 0–0.7)
NUC CELL # PRT MANUAL: 0 /100{WBCS}
OSMOLALITY SERPL CALC.SUM OF ELEC: 281 MOSM/KG (ref 273–304)
PLAT MORPH BLD: NORMAL
PLATELET # BLD AUTO: 326 10*3/UL (ref 130–400)
PMV BLD AUTO: 10.8 FL (ref 9.4–12.3)
POTASSIUM SERPL-SCNC: 3.9 MMOL/L (ref 3.5–5.3)
RBC # BLD AUTO: 4.34 10*6/UL (ref 4.2–5.4)
SMALL PLATELETS BLD QL SMEAR: ADEQUATE
SODIUM SERPL-SCNC: 133 MMOL/L (ref 135–147)
TSH SERPL-ACNC: 1.85 M[IU]/L (ref 0.27–4.2)
VARIANT LYMPHS NFR BLD MANUAL: 22 % (ref 20–45)
VIT B12 SERPL-MCNC: 568 PG/ML (ref 211–911)
WBC # BLD AUTO: 4.66 10*3/UL (ref 4.8–10.8)

## 2021-03-18 ENCOUNTER — HOSPITAL ENCOUNTER (OUTPATIENT)
Dept: FAMILY MEDICINE CLINIC | Facility: CLINIC | Age: 56
Discharge: HOME | End: 2021-03-18
Attending: FAMILY MEDICINE

## 2021-03-18 LAB
ALBUMIN SERPL-MCNC: 4.4 G/DL (ref 3.5–5)
ALBUMIN/GLOB SERPL: 1.6 {RATIO} (ref 1.4–2.6)
ALP SERPL-CCNC: 164 U/L (ref 53–141)
ALT SERPL-CCNC: 26 U/L (ref 10–40)
ANION GAP SERPL CALC-SCNC: 17 MMOL/L (ref 8–19)
APTT BLD: 24.1 S (ref 22.2–34.2)
AST SERPL-CCNC: 30 U/L (ref 15–50)
BASOPHILS # BLD AUTO: 0.03 10*3/UL (ref 0–0.2)
BASOPHILS NFR BLD AUTO: 0.5 % (ref 0–3)
BILIRUB SERPL-MCNC: 0.21 MG/DL (ref 0.2–1.3)
BUN SERPL-MCNC: 20 MG/DL (ref 5–25)
BUN/CREAT SERPL: 18 {RATIO} (ref 6–20)
CALCIUM SERPL-MCNC: 9.3 MG/DL (ref 8.7–10.4)
CHLORIDE SERPL-SCNC: 100 MMOL/L (ref 99–111)
CONV ABS IMM GRAN: 0.01 10*3/UL (ref 0–0.2)
CONV CO2: 26 MMOL/L (ref 22–32)
CONV IMMATURE GRAN: 0.2 % (ref 0–1.8)
CONV TOTAL PROTEIN: 7.1 G/DL (ref 6.3–8.2)
CREAT UR-MCNC: 1.11 MG/DL (ref 0.5–0.9)
DEPRECATED RDW RBC AUTO: 43.5 FL (ref 36.4–46.3)
EOSINOPHIL # BLD AUTO: 0.53 10*3/UL (ref 0–0.7)
EOSINOPHIL # BLD AUTO: 8 % (ref 0–7)
ERYTHROCYTE [DISTWIDTH] IN BLOOD BY AUTOMATED COUNT: 13.4 % (ref 11.7–14.4)
GFR SERPLBLD BASED ON 1.73 SQ M-ARVRAT: 55 ML/MIN/{1.73_M2}
GLOBULIN UR ELPH-MCNC: 2.7 G/DL (ref 2–3.5)
GLUCOSE SERPL-MCNC: 94 MG/DL (ref 65–99)
HCT VFR BLD AUTO: 36.7 % (ref 37–47)
HGB BLD-MCNC: 12.2 G/DL (ref 12–16)
INR PPP: 0.86 (ref 2–3)
LYMPHOCYTES # BLD AUTO: 2.02 10*3/UL (ref 1–5)
LYMPHOCYTES NFR BLD AUTO: 30.5 % (ref 20–45)
MCH RBC QN AUTO: 29.4 PG (ref 27–31)
MCHC RBC AUTO-ENTMCNC: 33.2 G/DL (ref 33–37)
MCV RBC AUTO: 88.4 FL (ref 81–99)
MONOCYTES # BLD AUTO: 0.49 10*3/UL (ref 0.2–1.2)
MONOCYTES NFR BLD AUTO: 7.4 % (ref 3–10)
NEUTROPHILS # BLD AUTO: 3.54 10*3/UL (ref 2–8)
NEUTROPHILS NFR BLD AUTO: 53.4 % (ref 30–85)
NRBC CBCN: 0 % (ref 0–0.7)
OSMOLALITY SERPL CALC.SUM OF ELEC: 290 MOSM/KG (ref 273–304)
PLATELET # BLD AUTO: 358 10*3/UL (ref 130–400)
PMV BLD AUTO: 10.4 FL (ref 9.4–12.3)
POTASSIUM SERPL-SCNC: 3.9 MMOL/L (ref 3.5–5.3)
PROTHROMBIN TIME: 9.7 S (ref 9.4–12)
RBC # BLD AUTO: 4.15 10*6/UL (ref 4.2–5.4)
SODIUM SERPL-SCNC: 139 MMOL/L (ref 135–147)
T4 FREE SERPL-MCNC: 1.4 NG/DL (ref 0.9–1.8)
TSH SERPL-ACNC: 1.1 M[IU]/L (ref 0.27–4.2)
WBC # BLD AUTO: 6.62 10*3/UL (ref 4.8–10.8)

## 2021-03-19 LAB — 25(OH)D3 SERPL-MCNC: 23.6 NG/ML (ref 30–100)

## 2021-05-03 ENCOUNTER — OFFICE VISIT CONVERTED (OUTPATIENT)
Dept: FAMILY MEDICINE CLINIC | Facility: CLINIC | Age: 56
End: 2021-05-03
Attending: FAMILY MEDICINE

## 2021-05-03 ENCOUNTER — HOSPITAL ENCOUNTER (OUTPATIENT)
Dept: FAMILY MEDICINE CLINIC | Facility: CLINIC | Age: 56
Discharge: HOME | End: 2021-05-03
Attending: FAMILY MEDICINE

## 2021-05-03 LAB
ALBUMIN SERPL-MCNC: 4.5 G/DL (ref 3.5–5)
ALBUMIN/GLOB SERPL: 1.4 {RATIO} (ref 1.4–2.6)
ALP SERPL-CCNC: 181 U/L (ref 53–141)
ALT SERPL-CCNC: 26 U/L (ref 10–40)
ANION GAP SERPL CALC-SCNC: 17 MMOL/L (ref 8–19)
AST SERPL-CCNC: 31 U/L (ref 15–50)
BILIRUB SERPL-MCNC: 0.2 MG/DL (ref 0.2–1.3)
BUN SERPL-MCNC: 14 MG/DL (ref 5–25)
BUN/CREAT SERPL: 14 {RATIO} (ref 6–20)
CALCIUM SERPL-MCNC: 9.1 MG/DL (ref 8.7–10.4)
CHLORIDE SERPL-SCNC: 98 MMOL/L (ref 99–111)
CONV CO2: 27 MMOL/L (ref 22–32)
CONV TOTAL PROTEIN: 7.7 G/DL (ref 6.3–8.2)
CREAT UR-MCNC: 1.02 MG/DL (ref 0.5–0.9)
GFR SERPLBLD BASED ON 1.73 SQ M-ARVRAT: >60 ML/MIN/{1.73_M2}
GLOBULIN UR ELPH-MCNC: 3.2 G/DL (ref 2–3.5)
GLUCOSE SERPL-MCNC: 87 MG/DL (ref 65–99)
OSMOLALITY SERPL CALC.SUM OF ELEC: 286 MOSM/KG (ref 273–304)
POTASSIUM SERPL-SCNC: 3.6 MMOL/L (ref 3.5–5.3)
SODIUM SERPL-SCNC: 138 MMOL/L (ref 135–147)
URATE SERPL-MCNC: 5.4 MG/DL (ref 2.5–7.5)

## 2021-05-07 NOTE — PROGRESS NOTES
"   Progress Note      Patient Name: Josette Carcamo   Patient ID: [id number]   Sex: Female   YOB: 1965    Referring Provider: Nicholas Doherty MD    Visit Date: July 23, 2018    Provider: VERONICA Peralta   Location: [facility], [place], operated by [facility]   Location Address: [address] [place] [address]  [id number]   Location Phone: [phone]          Chief Complaint  · Annual Exam  · See HPI  · (Health Maintainence Information Reviewed Under Results)      History Of Present Illness  Date of Last Mammogram: 10/20/2017.   Date of Last Colonoscopy: approximately 5 years ago- is followed by GI      Needs routine physical.    Had severe hot flashes a few months ago- took Effexor 37.5 x 1 month, then stopped. Hot flashes have resolved.    Saw motility clinic who recommended to go to the [facility] for a motility disorder- tried Buspar- severe drowsiness.  Nothing else to do from local motility clinic.    Saw Cardiology - had a run of tachycardia, chest pain, BP went up and felt like air head. EKG showed 1st degree block. After stress test and echo, holter monitor and EKG- came down to mild RBBB. No medication added.     Takes HCTZ for LE edema. Controlled with medication.    Has not seen gastro in 1 year due to insurance change. Plans to call and make appt. No new issues.     Changed from Linzess to Trulance for constipation- has worked \"beautifully\".    ENT: had scope of vocal cords for hoarseness . He thought it was from allergies. Had a lot of swelling in nasal passages. He started OTC nasal steroid. Will follow up in 1 month to eval if helped with hoarseness. She thinks she is improving.    Sees periodontist for gum recession.     Has significant low back pain on right side. If puts all of weight on right leg, improves some. Feels crooked. Previous CXR suggested scoliosis. Has lost height over time- approx. 2 inches.    Has completed high dose Vit D- is currently taking 2000 mg qd    Has " been experiencing swelling redness and pain in left 5th finger since last night. Never had gout.    Yuvafem helps with vaginal dryness- uses 2 night weekly.  Uses Bactrim after intercourse- frequently gets UTI - helps to use Bactrim.     No exercise- feels too exhausted- is active with grandchildren 2 days week    Gets numbness in lower legs and currently in left 5th finger    History of Osteopenia- unsure when had last bone density             Past Medical History  Disease Name Date Onset Notes   Allergic rhinitis, seasonal --  --    Anemia --  gestational   Asthma --  --    Broken Bones --  --    Chronic liver disease --  --    Colitis --  --    Constipation, chronic 07/23/2018 --    Dysphagia 07/23/2018 --    Edema 07/23/2018 --    Edema of both legs --  --    Fatigue 07/23/2018 --    Gestational Diabetes --  --    Heart murmur --  --    Hemorrhoids --  --    Hypertension --  gestational   Incontinence in female 07/23/2018 --    Incontinence of Urine --  --    Kidney stones --  --    Primary biliary cirrhosis --  --    RBBB --  --    Ringing in ears --  --    Scoliosis 07/23/2018 --    Shortness of breath --  --    Urinary urgency --  --    Vitamin D deficiency 07/23/2018 --          Past Surgical History  Procedure Name Date Notes   Cholecstectomy 2013 --    Colonoscopy --  --    D and C --  --    EGD (Endoscopy) --  --    Gum graft --  --    Hysterectomy --  --    Tubal ligation 1990 --    Vaginal surgery --  Yanet Sandrita Procedure for vaginal wall         Medication List  Name Date Started Instructions   EpiPen 0.3 mg/0.3 mL injection auto-injector  inject 0.3 milliliter (0.3 mg) by intramuscular route once as needed for anaphylaxis   ergocalciferol (vitamin D2) 50,000 unit oral capsule 03/20/2018 take 1 capsule (50,000 unit) by oral route once weekly for 90 days   hydrochlorothiazide 12.5 mg oral tablet 03/15/2018 take 1 tablet (12.5 mg) by oral route once daily   ipratropium bromide 42 mcg (0.06 %) nasal  spray,non-aerosol 03/07/2018 1-2 sprays each nostril 3 times daily   Pepcid 40 mg oral tablet 01/15/2018 take 1 tablet (40 mg) by oral route once daily at bedtime   Probiotic 20 billion cell oral capsule  take 1 capsule by oral route daily   Skelaxin 800 mg oral tablet 07/09/2018 take 1 tablet (800 mg) by oral route 3-4 times daily as needed for 10 days   Trulance 3 mg oral tablet  take 1 tablet (3 mg) by oral route once daily   THEODORE Forte 500 mg oral tablet 05/29/2018 take 2 tablets by oral route qAM and 1 tablet by oral route qPM for 30 days   Valtrex 1 gram oral tablet 01/15/2018 take 1 tablet (1,000 mg) by oral route once daily for 30 days   Ventolin HFA 90 mcg/actuation inhalation HFA aerosol inhaler 02/20/2018 inhale 1 puff (90 mcg) by inhalation route every 6 hours as needed   Vitamin D3 1,000 unit oral capsule  take capsule by oral route daily   vitamin E 400 unit oral capsule  take 1 capsule by oral route daily   Yuvafem 10 mcg vaginal tablet 03/07/2018 Insert one PV twice weekly         Allergy List  Allergen Name Date Reaction Notes   clindamycin HCl --  hives --    Egg allergy --  SOA --    erythromycin --  hvies --    Keflex --  hives --    Latex --  SOA --    Macrodantin --  hives --    PENICILLINS --  hives --          Family Medical History  Disease Name Relative/Age Notes   Bladder Cancer / --     Father/    Breast Neoplasm, Malignant / --     Mother/     Sister/    Cervical Neoplasm, Malignant / --     Sister/    Colon Cancer / --     Mother/    Congestive Heart Failure / grandparents not specified   Diabetes / aunt or uncle not specified    Father/     Mother/    Heart Disease / aunt or uncle not specified grandparents not specified    Father/     Mother/     Sister/    Lung cancer / --     Father/     Mother/    Melanoma / aunt or uncle not specified   Prostate cancer / --     Father/    Stroke / TIA    Father/     Sister/          Reproductive History  Menstrual   Age Menopause: 43   Pregnancy  "Summary   Total Pregnancies: 4 Full Term: 0 Premature: 0   Ab Induced: 0 Ab Spontaneous: 0 Ectopics: 0   Multiples: 0 Livin         Social History  Finding Status Start/Stop Quantity Notes   Alcohol Never --/-- --  --    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 --  --/-- --  --     --  --/-- --  --    Tobacco Never --/-- --  --          Immunizations  NameDate Admin Mfg Trade Name Lot Number Route Inj VIS Given VIS Publication   Tdap2018 SKB BOOSTRIX [id number]  2018   Comments: ndc [id number]         Review of Systems  · Constitutional  o Admits  o : fatigue, weight gain  o Denies  o : appetite change, night sweats, weight loss  · HENT  o Admits  o : tinnitus, dental problems  o Denies  o : hearing loss, vertigo, nasal discharge, nose bleeding, oral lesions, sore throat  · Breasts  o Denies  o : lumps, tenderness, swelling, nipple discharge  · Cardiovascular  o Denies  o : chest pain, decreased exercise tolerance, dyspnea on exertion, palpitations  · Respiratory  o Denies  o : cough, shortness of breath, wheezing, snoring, apneas  · Gastrointestinal  o Admits  o : dysphagia, constipation  o Denies  o : abdominal pain, nausea, vomiting, heartburn, changes in bowel habits, diarrhea  · Genitourinary  o Admits  o : incontinence, nocturia, frequency, urgency  o Denies  o : dysuria, hematuria, sexual dysfunction  · Neurologic  o Admits  o : tingling or numbness  o Denies  o : abnormal gait, facial weakness, headache, memory difficulties, seizures, tremors  · Psychiatric  o Admits  o : sleep distrubances  o Denies  o : anxiety, decreased concentration, irritability, panic attacks, sadness/tearfulness      Vitals  Date Time BP Position Site L\R Cuff Size HR RR TEMP(F) WT  HT  BMI kg/m2 BSA m2 O2 Sat        2018 06:35 /82 Sitting    84 - R   184lbs 0oz 5'  2\" 33.65 1.91            Physical Examination  · Constitutional  o Appearance  o : well developed  · Head and " Face  o HEENT  o : Unremarkable  · Eyes  o Conjunctivae  o : conjunctivae normal  · Neck  o Inspection/Palpation  o : supple  o Thyroid  o : no thyromegaly  · Respiratory  o Respiratory Effort  o : breathing unlabored  o Auscultation of Lungs  o : clear to ascultation  · Cardiovascular  o Heart  o :   § Auscultation of Heart  § : regular rate and rhythm  o Peripheral Vascular System  o :   § Extremities  § : mild lower extremity edema present, no cyanosis, no distal hair loss, normal capillary refill  · Breasts  o FEMALE BREAST EXAM  o :   § Masses  § : no masses  § Nipple Discharge  § : no nipple discharge  § Breast symmetry  § : breast are symmetrical  · Gastrointestinal  o Abdomen  o : soft, non-tender, non-distended, + bowel sounds, no hepatosplenomegaly, no masses palpated  · Lymphatic  o Neck  o : no lymphadenopathy present  · Musculoskeletal  o General  o :   § General Musculoskeletal  § : Right hand 5th finger, swollen, red, tender- most obvious in distal joint  · Skin and Subcutaneous Tissue  o General Inspection  o : no lesions present, no areas of discoloration, skin turgor normal, texture normal  · Neurologic  o Gait and Station  o :   § Gait Screening  § : normal gait  · Psychiatric  o Mood and Affect  o : mood normal, affect appropriate  · Back  o Inspection  o : no redness, no deformities  o Palpation  o : tednerness present, no swelling, midline NTTP, right side low back tenderness- almost SI area  o Range of Motion  o : flexion normal- 60 to 90 degrees, hyperextension normal 5-15 degrees, lateral bending normal 10-20 degrees, axial rotation normal 5-15 degrees  · Right Hip  o Inspection  o : no redness  o Palpation  o : Greater Trochanter nontender  o Range of Motion  o : flexion 0-90          Results     Reviewed lab results from recent employee physical labs     Reviewed CXR from 10/30/17- suggest mild dextroconvex scoliosis- possibly positional             Assessment  · Screening  Mammogram     V76.10/Z12.39  · Constipation, chronic     564.00/K59.09  · Finger pain, left     729.5/M79.645  · Back pain     724.5/M54.9  · Fatigue     780.79/R53.83  · Vitamin D deficiency     268.9/E55.9  · Chronic liver disease     571.9/K76.9  · Primary biliary cirrhosis     571.6/K74.3  · Edema     782.3/R60.9  · Incontinence in female     625.6/R32  · Dysphagia     787.20/R13.10  · Scoliosis     737.30/M41.9  · Routine adult health maintenance     V70.0/Z00.00      Plan  · Orders  o Screening Mammogram 3D Bilateral (41556, ) - V76.10/Z12.39 - 07/23/2018   Schedule in the month of October  o Uric Acid Serum H (78713) - 729.5/M79.645 - 07/23/2018  o Vitamin D (25-Hydroxy) Level (55159) - 268.9/E55.9 - 07/23/2018  o Vitamin B12 (44914) - 780.79/R53.83 - 07/23/2018  o Scoliosis Survey (69005) - 724.5/M54.9 - 07/23/2018  o [facility] - 724.5/M54.9, 737.30/M41.9 - 07/23/2018   please schedule with Jill  · Instructions  o Counseled on monthly breast self exams.   o Counseled on diet and exercise.   o Counseled on weight-bearing exercise.  o No medication refills needed at this time- will refill prn  o Handout given: AHA Diet and Lifestyle recommendations  o Will check uric acid- possible gout to finger- at risk due to HCTZ  o Xray definitely shows scoliosis- will schedule with PT            Electronically Signed by: VERONICA Peralta -Author on July 25, 2018 07:09:14 AM

## 2021-05-07 NOTE — PROGRESS NOTES
Progress Note      Patient Name: Josette Carcamo   Patient ID: [id number]   Sex: Female   YOB: 1965    Referring Provider: Nicholas Doherty MD    Visit Date: March 16, 2018    Provider: Deepali Michele MD   Location: [facility]   Location Address: [address] [place] [address]  [id number]   Location Phone: [phone]          Chief Complaint     Discuss health issues       History Of Present Illness  Josette Carcamo is a 52 year old /White female who presents for evaluation and treatment of:      When she was child she had strep and she has had a murmur.  Sometimes she gets positional palpitations.  Yesterday morning she had an awful run of something.  It was a hard beat several in a roll and there was a loud boom in her ears.  She felt weak afterwards.  She checked her /82 and she had EKG which was in a first degree AV block.  Dr. Doherty ordered labs .  All normal.  She had good liver enzymes.  She has had major fatigue.    She needs follow up with Dr. Kimball.  The motility clinic is finished.  That is why she is referred to [facility] because of the abnormal.  She has a spot in her upper esophagus where she can't swallow solids.  When it happens she feels like she is breathing through a coffee stirrer.    She is on Linzess which causes unpredictable diarrhea.  She is going to try Trulance which is a new drug.    Starting yesterday she restarted HCTZ because her BP was a little higher .  She has had some swelling .  She is taking the 12.5 mg dose  She is scheduled to see Dr. Zaragoza next week for the unusual palpitations.  She takes Vit D.  She takes 1000mg per day.  She does have deep bone ache at night.  In the past she had a really low Vit D.  She keeps a dry mouth but she has tested negative for Sjrogen's.       Past Medical History  Disease Name Date Onset Notes   Allergic rhinitis, seasonal --  --    Anemia --  gestational   Asthma --  --     Broken Bones --  --    Chronic liver disease --  --    Colitis --  --    Gestational Diabetes --  --    Heart murmur --  --    Hemorrhoids --  --    Hypertension --  gestational   Kidney stones --  --    Primary biliary cirrhosis --  --    Ringing in ears --  --    Shortness of breath --  --          Past Surgical History  Procedure Name Date Notes   Cholecstectomy 2013 --    Colonoscopy --  --    D and C --  --    EGD (Endoscopy) --  --    Hysterectomy --  --    Tubal ligation 1990 --          Medication List  Name Date Started Instructions   EpiPen 0.3 mg/0.3 mL injection auto-injector  inject 0.3 milliliter (0.3 mg) by intramuscular route once as needed for anaphylaxis   hydrochlorothiazide 12.5 mg oral tablet 03/15/2018 take 1 tablet (12.5 mg) by oral route once daily   ipratropium bromide 42 mcg (0.06 %) nasal spray,non-aerosol 03/07/2018 1-2 sprays each nostril 3 times daily   Linzess 290 mcg oral capsule 01/15/2018 take 1 capsule (290 mcg) by oral route once daily on an empty stomach at least 30 minutes before 1st meal of the day   Pepcid 40 mg oral tablet 01/15/2018 take 1 tablet (40 mg) by oral route once daily at bedtime   Probiotic 20 billion cell oral capsule  take 1 capsule by oral route daily   THEODORE Forte 500 mg oral tablet  take 3 tablets daily   Valtrex 1 gram oral tablet 01/15/2018 take 1 tablet (1,000 mg) by oral route once daily for 30 days   Ventolin HFA 90 mcg/actuation inhalation HFA aerosol inhaler 02/20/2018 inhale 1 puff (90 mcg) by inhalation route every 6 hours as needed   Vitamin D3 1,000 unit oral capsule  take capsule by oral route daily   vitamin E 400 unit oral capsule  take 1 capsule by oral route daily   Yuvafem 10 mcg vaginal tablet 03/07/2018 Insert one PV twice weekly         Allergy List  Allergen Name Date Reaction Notes   clindamycin HCl --  hives --    Egg allergy --  SOA --    erythromycin --  hvies --    Keflex --  hives --    Latex --  SOA --    Macrodantin --  hives --   "  PENICILLINS --  hives --          Family Medical History  Disease Name Relative/Age Notes   Bladder Cancer / --     Father/    Breast Neoplasm, Malignant / --     Mother/     Sister/    Cervical Neoplasm, Malignant / --     Sister/    Colon Cancer / --     Mother/    Congestive Heart Failure / grandparents not specified   Diabetes / aunt or uncle not specified    Father/     Mother/    Heart Disease / aunt or uncle not specified grandparents not specified    Father/     Mother/     Sister/    Lung cancer / --     Father/     Mother/    Melanoma / aunt or uncle not specified   Prostate cancer / --     Father/    Stroke / TIA    Father/     Sister/          Reproductive History  Menstrual   Age Menopause: 43   Pregnancy Summary   Total Pregnancies: 4 Full Term: 0 Premature: 0   Ab Induced: 0 Ab Spontaneous: 0 Ectopics: 0   Multiples: 0 Livin         Social History  Finding Status Start/Stop Quantity Notes   Alcohol Never --/-- --  --    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 --  --/-- --  --     --  --/-- --  --    Tobacco Never --/-- --  --          Immunizations  NameDate Admin Mfg Trade Name Lot Number Route Inj VIS Given VIS Publication   Tdap2018 SKB BOOSTRIX [id number] IM  2018   Comments: ndc [id number]         Vitals  Date Time BP Position Site L\R Cuff Size HR RR TEMP(F) WT  HT  BMI kg/m2 BSA m2 O2 Sat HC       2018 05:16 /82 Sitting    83 - R  97.8 181lbs 0oz 5'  3\" 32.06 1.91 98 %           Physical Examination  · Constitutional  o Appearance  o : well developed, well-nourished, in no acute distress appears healthy  · Eyes  o Conjunctivae  o : conjunctivae normal  · Neck  o Inspection/Palpation  o : supple  o Thyroid  o : no thyromegaly  · Respiratory  o Respiratory Effort  o : breathing unlabored  o Auscultation of Lungs  o : clear to ascultation  · Cardiovascular  o Heart  o :   § Auscultation of Heart  § : regular rate and rhythm  o Peripheral " Vascular System  o :   § Extremities  § : trace edema  · Gastrointestinal  o Abdominal Examination  o :   § Abdomen  § : Soft , non-tender nor hepatomegaly  · Lymphatic  o Neck  o : no lymphadenopathy present  · Musculoskeletal  o General  o :   § General Musculoskeletal  § : Muscle tone, strength, and development grossly normal.  · Skin and Subcutaneous Tissue  o General Inspection  o : no lesions present, no areas of discoloration, skin turgor normal, texture normal  · Neurologic  o Gait and Station  o :   § Gait Screening  § : normal gait  · Psychiatric  o Mood and Affect  o : mood normal, affect appropriate          Assessment  · Essential hypertension     401.9/I10  · Fatigue     780.79/R53.83  · Vitamin D deficiency     268.9/E55.9  · Menopausal vasomotor syndrome     627.2/N95.1      Plan  · Orders  o B12 Folate levels (B12FO) - 780.79/R53.83 - 03/16/2018  o ACO-39: Current medications updated and reviewed () - - 03/16/2018  o Vitamin D, 25-hydroxy (32757) - 268.9/E55.9, 780.79/R53.83 - 03/16/2018  · Medications  o venlafaxine 37.5 mg oral tablet extended release 24hr   SIG: take 1 tablet (37.5 mg) by oral route once daily in the morning at the same time each day with food for 90 days   DISP: (90) tablets with 1 refills  Prescribed on 03/16/2018     · Instructions  o Patient was educated/instructed on their diagnosis, treatment and medications prior to discharge from the clinic today.            Electronically Signed by: Deepali Michele MD -Author on March 16, 2018 07:17:56 PM

## 2021-05-07 NOTE — PROGRESS NOTES
"   Progress Note      Patient Name: Josette Carcamo   Patient ID: [id number]   Sex: Female   YOB: 1965    Referring Provider: Nicholas Doherty MD    Visit Date: December 18, 2020    Provider: VERONICA Peralta   Location: [facility] [place]   Location Address: [address] [place] [address] [place]  [id number]   Location Phone: [phone]          Chief Complaint     YEARLY WELLNESS                   History Of Present Illness  Josette Carcamo is a 55 year old /White female who presents for evaluation and treatment of:      YEARLY WELLNESS      PBC: She talks with GI on telephone every few months.  No notes in chart.  meds she is taking can interfere with thyroid- needs CBC and TSH to follow up . Had Fibro Scan in Feb 2020 to evaluate liver- did that instead of MRI which showed no current cirrhosis. She will contact office for refills- we have been writing when GI cannot be reached. Colonoscopy UTD per patient.    Sees Ines Gaviria for nummular eczema- has been getting shots- 3 shots so far- no notes in chart.     Stopped all estrogen cream due to other meds and possible reactions- had last Yanet Sandrita Procedure July 2020 - seemed to help. Sex has been more comfortable and not as many bladder spasms. Uses astroglide lubricant. Still takes Bactrim after intercourse - no recent UTI's.     Used Valtex for fever blisters - had not used for a while but now is getting them again. Is back to Valtrex 1 qd.     GI still thinks she has Sjogren's- labs have not supported but she has every symptom- swallowing difficulty, dry mouth, joint pain. She currently has acracked tooth  and is still in the progress of getting an implant - she has an open area pending the implant- postponed due to Covid.    Asthma- stable . Last used inhaler about 1 month ago    Edema: wears compression stockings. HCTZ helps some- still has swelling at end of the day. Feels \"tight\" at times but scans do not show " ascites    Usually gets lag crapms when Vit D is low, takes 1000 mg OTC, has been getting leg cramps again.    Still gets palpitations at times-like a run of V tach - saw cardiology 2018- has PVC's and PAC's. Did not want to use beta blocker due to low BP. 2 days ago  felt bad for about 1 hour- palpitations- thick choking sensation that moved up into her neck, lasted about 1 hour, felt exhausted after.     Scoliosis has not been evaluated for a while. 2018 was last xray. Back has been hurting more.    Constipation controlled with Linzess and probiotic  - doing OK    Allergies stable- does allergy injections weekly. Has  been using OTC Nasacort.    Hemmorhoids stable- uses tucks and prep H     Epi pen needs to be replaced- has not used in years for anaphylaxis. Usually had steroids, Benadryl and Zofran  in her emergency kit. Needs Zofran, Medrol dose pack to replace, as well as epi pen.              Past Medical History  Disease Name Date Onset Notes   Allergic rhinitis, seasonal --  --    Anemia --  gestational   Asthma --  --    Broken Bones --  --    Colitis --  --    Constipation, chronic 07/23/2018 --    DDD (degenerative disc disease), cervical --  --    Dysphagia 07/23/2018 --    Edema 07/23/2018 --    Esophageal motility disorder --  --    Fatigue 07/23/2018 --    Fever blister --  --    Gestational Diabetes --  --    Heart murmur --  --    Hemorrhoids --  --    Hiatal hernia --  --    Kidney stones --  --    Liver lesion --  --    OAB (overactive bladder) --  --    Primary biliary cirrhosis --  --    RBBB --  --    Scoliosis 07/23/2018 --    Vitamin D deficiency 07/23/2018 --          Past Surgical History  Procedure Name Date Notes   Cholecstectomy 2013 --    Colonoscopy --  --    Dilation and Curettage --  --    EGD (Endoscopy) --  --    Gum graft --  --    Hysterectomy --  --    Liver biopsy --  --    Thyroid biopsy --  --    Tubal ligation 1990 --    Vaginal surgery --  Yanet Sandrita Procedure for vaginal  wall         Medication List  Name Date Started Instructions   Bactrim -160 mg oral tablet 12/18/2020 take 1 tablet by oral route once daily for 30 days   EpiPen 0.3 mg/0.3 mL injection auto-injector 12/18/2020 inject 0.3 milliliter (0.3 mg) by intramuscular route once as needed for anaphylaxis   folic acid 1 mg oral tablet 12/18/2020 take 1 tablet (1 mg) by oral route once daily for 30 days   hydrochlorothiazide 12.5 mg oral tablet 12/18/2020 TAKE ONE TABLET BY MOUTH DAILY   Linzess 290 mcg oral capsule 11/23/2020 take 1 capsule (290 mcg) by oral route once daily on an empty stomach at least 30 minutes before 1st meal of the day for 30 days   Ocaliva 5 mg oral tablet  take 1 tablet (5 mg) by oral route once daily   Pepcid 40 mg oral tablet 12/18/2020 take 1 tablet (40 mg) by oral route once daily at bedtime for 30 days   Probiotic 20 billion cell oral capsule  take 1 capsule by oral route daily   THEODORE Forte 500 mg oral tablet 12/18/2020 take 1 tablet Q am and 2 tablets Q pm   Valtrex 1 gram oral tablet 12/18/2020 take 1 tablet (1,000 mg) by oral route once daily for 30 days   Ventolin HFA 90 mcg/actuation inhalation HFA aerosol inhaler 11/23/2020 inhale 1 puff (90 mcg) by inhalation route every 6 hours as needed   Vitamin D3 1,000 unit oral capsule  take capsule by oral route daily   vitamin E 400 unit oral capsule  take 1 capsule by oral route daily         Allergy List  Allergen Name Date Reaction Notes   clindamycin HCl --  hives --    Egg allergy --  SOA --    erythromycin --  hvies --    Gluten --  --  --    Keflex --  hives --    Latex --  SOA --    Macrodantin --  hives --    PENICILLINS --  hives --          Family Medical History  Disease Name Relative/Age Notes   Breast Neoplasm, Malignant Mother/  Sister/   --    Cervical Neoplasm, Malignant Sister/   --    Stroke Father/  Sister/   TIA   Heart Disease Father/  Mother/  Sister/   aunt or uncle not specified grandparents not specified   Congestive  Heart Failure  grandparents not specified   Rheumatoid arthritis  --    Colon Cancer Mother/   --    Lung cancer Father/  Mother/   --    Prostate cancer Father/   --    Melanoma  aunt or uncle not specified   Bladder Cancer Father/   --    Diabetes Father/  Mother/   aunt or uncle not specified         Reproductive History  Menstrual   Age Menarche: 10 Menopause Status: Postmenopausal Age Menopause: 43   Pregnancy Summary   Total Pregnancies: 4 Full Term: 1 Premature: 1   Ab Induced: 0 Ab Spontaneous: 2 Ectopics: 0   Multiples: 0 Livin         Social History  Finding Status Start/Stop Quantity Notes   Alcohol Never --/-- --  --    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 --  --/-- --  --     --  --/-- --  --    Tobacco Never --/-- --  --          Immunizations  NameDate Admin Mfg Trade Name Lot Number Route Inj VIS Given VIS Publication   HepA2018 XIOMARA HAVRIX-ADULT  IM  2018   Comments:    Expoluvwe42/ NE Not Entered  NE NE 10/19/2020    Comments: [employer] EMPLOYEE   Tdap2018 XIOMARA BOOSTRIX [id number] IM  2018   Comments: ndc [id number]         Review of Systems  · Constitutional  o Admits  o : fatigue  o Denies  o : fever, headache  · Eyes  o Denies  o : changes in vision  · HENT  o Admits  o : dysphagia  o Denies  o : vertigo, sore throat, ear pain  · Breasts  o Denies  o : lumps, tenderness, swelling  · Cardiovascular  o Admits  o : palpitations  o Denies  o : chest pain  · Respiratory  o Denies  o : shortness of breath, cough  · Gastrointestinal  o Admits  o : reviewed and unchanged  o Denies  o : abdominal pain  · Genitourinary  o Admits  o : urgency, frequency, reviewed and unchanged  o Denies  o : dysuria  · Integument  o Admits  o : reviewed and unchanged  · Musculoskeletal  o Admits  o : back pain, hip pain  · Endocrine  o Admits  o : cold intolerance  o Denies  o : heat intolerance  · Psychiatric  o Denies  o : anxiety,  "depression      Vitals  Date Time BP Position Site L\R Cuff Size HR RR TEMP (F) WT  HT  BMI kg/m2 BSA m2 O2 Sat FR L/min FiO2 HC       12/18/2020 09:45 /84 Sitting    96 - R   185lbs 9oz 5'  2\" 33.94 1.92 99 %            Physical Examination  · Constitutional  o Appearance  o : well developed, well-nourished, in no acute distress  · Head and Face  o HEENT  o : Unremarkable  · Eyes  o Conjunctivae  o : conjunctivae normal  o Pupils and Irises  o : pupils equal and round, pupils reactive to light bilaterally  · Neck  o Inspection/Palpation  o : supple  o Thyroid  o : no thyromegaly  · Respiratory  o Respiratory Effort  o : breathing unlabored  o Auscultation of Lungs  o : clear to ascultation  · Cardiovascular  o Heart  o :   § Auscultation of Heart  § : regular rate and rhythm  o Peripheral Vascular System  o :   § Carotid Arteries  § : normal pulses bilaterally, no bruits present  § Pedal Pulses  § : bilateral pulse strength 2+  § Extremities  § : 1+ edema present  · Gastrointestinal  o Abdominal Examination  o :   § Abdomen  § : soft, no tenderness, BS normal  · Lymphatic  o Neck  o : no lymphadenopathy present  · Musculoskeletal  o General  o :   § General Musculoskeletal  § : No joint swelling or deformity., Muscle tone, strength, and development grossly normal.  · Skin and Subcutaneous Tissue  o General Inspection  o : excoriated areas noted to bilat forearms.  · Neurologic  o Gait and Station  o :   § Gait Screening  § : normal gait  · Psychiatric  o Mood and Affect  o : mood normal, affect appropriate          Results     Mammo 12-18-20 normal.  Reviewed labs from 10/2020, 11/2020    Scoliosis Xray - T5-T6 12 degrees,  slightly more than previous xray in 2018 where it was read as 9 degrees; T11-T12 essentially the same.       Assessment  · Asthma     493.90/J45.909  · Vitamin D deficiency     268.9/E55.9  · Constipation, " chronic     564.00/K59.09  · Dysphagia     787.20/R13.10  · Edema     782.3/R60.9  · Scoliosis     737.30/M41.9  · Allergic rhinitis, seasonal     477.9/J30.2  · Hemorrhoids     455.6/K64.9  · Primary biliary cirrhosis     571.6/K74.3  · Recurrent UTI     599.0/N39.0  · Fever blister     054.9/B00.1  · Folic acid deficiency     266.2/E53.8  · Medication management     V58.69/Z79.899  · Palpitations     785.1/R00.2  · Wellness examination     V70.0/Z00.00  · Nummular eczema     692.9/L30.0  · H/O anaphylactic shock     V13.81/Z87.892  · Depression screen     V79.0/Z13.31  PHQ9= 4; fatigue and sleeping problems due to PBC not depression.  · BMI 33.0-33.9,adult     V85.33/Z68.33      Plan  · Orders  o CBC with Auto Diff [facility] (20222) - 571.6/K74.3, V58.69/Z79.899, V70.0/Z00.00 - 12/18/2020  o CMP [facility] (74250) - 782.3/R60.9, 571.6/K74.3, V58.69/Z79.899, V70.0/Z00.00 - 12/18/2020  o TSH [facility] (83117) - 571.6/K74.3, V58.69/Z79.899 - 12/18/2020  o ACO-16: BMI above normal range and follow-up plan is documented () - V85.33/Z68.33 - 12/18/2020  o ACO-18: Negative screen for clinical depression using a standardized tool () - V79.0/Z13.31 - 12/18/2020  o ACO-39: Current medications updated and reviewed (1159F, ) - - 12/18/2020  o Folic acid, serum (42737) - 266.2/E53.8 - 12/18/2020  o Vitamin D (25-Hydroxy) Level (94158) - 268.9/E55.9 - 12/18/2020  o Scoliosis Survey (43882) - 737.30/M41.9 - 12/18/2020  · Medications  o Zofran 8 mg oral tablet   SIG: Take 1 q 6-8 hrs as needed   DISP: (6) Tablet with 0 refills  Prescribed on 12/18/2020     o Medrol (Jordi) 4 mg oral tablets,dose pack   SIG: take by oral route as directed per package instructions   DISP: (1) Dose Pack with 0 refills  Prescribed on 12/18/2020     o Bactrim -160 mg oral tablet   SIG: take 1 tablet by oral route once daily for 30 days   DISP: (30) Tablet with 11 refills  Adjusted on 12/18/2020     o folic acid 1 mg oral tablet   SIG: take 1 tablet (1  mg) by oral route once daily for 30 days   DISP: (30) Tablet with 11 refills  Adjusted on 2020     o hydrochlorothiazide 12.5 mg oral tablet   SIG: TAKE ONE TABLET BY MOUTH DAILY   DISP: (30) Tablet with 11 refills  Adjusted on 2020     o Pepcid 40 mg oral tablet   SIG: take 1 tablet (40 mg) by oral route once daily at bedtime for 30 days   DISP: (30) Tablet with 11 refills  Adjusted on 2020     o THEODORE Forte 500 mg oral tablet   SIG: take 1 tablet Q am and 2 tablets Q pm   DISP: (30) Tablet with 11 refills  Adjusted on 2020     o Valtrex 1 gram oral tablet   SIG: take 1 tablet (1,000 mg) by oral route once daily for 30 days   DISP: (30) Tablet with 11 refills  Adjusted on 2020     o EpiPen 0.3 mg/0.3 mL injection auto-injector   SIG: inject 0.3 milliliter (0.3 mg) by intramuscular route once as needed for anaphylaxis   DISP: (1) Syringe with 5 refills  Refilled on 2020     o ipratropium bromide 42 mcg (0.06 %) nasal spray,non-aerosol   SI-2 sprays each nostril 3 times daily   DISP: (3) Bottle with 3 refills  Discontinued on 2020     o Medications have been Reconciled  o Transition of Care or Provider Policy  · Instructions  o Patient was educated/instructed on their diagnosis, treatment and medications prior to discharge from the clinic today.  o Asked MA to request records from [facility].  o She will f/u with specialists as recommended  o Meds refilled as needed.  o She will continue to utilize weight watchers diet which has been successful in the past . Encouraged to exercise as much as possible.             Electronically Signed by: VERONICA Peralta -Author on 2020 01:24:43 PM

## 2021-05-07 NOTE — PROGRESS NOTES
Progress Note      Patient Name: Josette Carcamo   Patient ID: [id number]   Sex: Female   YOB: 1965    Referring Provider: Nicholas Doherty MD    Visit Date: May 3, 2021    Provider: Nicholas Doherty MD   Location: [facility] [place]   Location Address: [address] [place] [address] [place]  [address]   Location Phone: [phone]          Chief Complaint     PATIENT IS HERE FOR LEFT KNEE PAIN AND SWELLING.  THIS HAS BEEN GOING ON FOR ABOUT A MONTH AND LAST  NIGHT THE PAIN KEPT HER UP ALL NIGHT.       History Of Present Illness  Josette Carcamo is a 55 year old /White female who presents for evaluation and treatment of:      left knee pain x 1 month- no known injury- mild medial pain and swelling- no modifying factors       Past Medical History  Disease Name Date Onset Notes   Allergic rhinitis, seasonal --  --    Anemia --  gestational   Asthma --  --    Broken Bones --  --    Colitis --  --    Constipation, chronic 07/23/2018 --    COVID-19 virus infection --  --    DDD (degenerative disc disease), cervical --  --    Dysphagia 07/23/2018 --    Edema 07/23/2018 --    Esophageal motility disorder --  --    Fatigue 07/23/2018 --    Fever blister --  --    Gestational Diabetes --  --    Heart murmur --  --    Hemorrhoids --  --    Hiatal hernia --  --    Kidney stones --  --    Liver lesion --  --    OAB (overactive bladder) --  --    Primary biliary cirrhosis --  --    RBBB --  --    Scoliosis 07/23/2018 --    Vitamin D deficiency 07/23/2018 --          Past Surgical History  Procedure Name Date Notes   Cholecstectomy 2013 --    Colonoscopy --  --    Dilation and Curettage --  --    EGD (Endoscopy) --  --    Gum graft --  --    Hysterectomy --  --    Liver biopsy --  --    Thyroid biopsy --  --    Tubal ligation 1990 --    Vaginal surgery --  Yanet Sandrita Procedure for vaginal wall         Medication List  Name Date Started Instructions   Bactrim -160 mg oral tablet 12/18/2020  take 1 tablet by oral route once daily for 30 days   EpiPen 0.3 mg/0.3 mL injection auto-injector 12/18/2020 inject 0.3 milliliter (0.3 mg) by intramuscular route once as needed for anaphylaxis   folic acid 1 mg oral tablet 01/21/2021 take 1 tablet (1 mg) by oral route once daily for 90 days   hydrochlorothiazide 12.5 mg oral tablet 01/21/2021 TAKE ONE TABLET BY MOUTH DAILY   Linzess 290 mcg oral capsule 01/21/2021 take 1 capsule (290 mcg) by oral route once daily on an empty stomach at least 30 minutes before 1st meal of the day for 90 days   Medrol (Jordi) 4 mg oral tablets,dose pack 12/18/2020 take by oral route as directed per package instructions   Ocaliva 5 mg oral tablet  take 1 tablet (5 mg) by oral route once daily   Pepcid 40 mg oral tablet 01/21/2021 take 1 tablet (40 mg) by oral route once daily at bedtime for 90 days   Probiotic 20 billion cell oral capsule  take 1 capsule by oral route daily   THEODORE Forte 500 mg oral tablet 01/21/2021 take 1 tablet Q am and 2 tablets Q pm   Valtrex 1 gram oral tablet 12/18/2020 take 1 tablet (1,000 mg) by oral route once daily for 30 days   Ventolin HFA 90 mcg/actuation inhalation HFA aerosol inhaler 11/23/2020 inhale 1 puff (90 mcg) by inhalation route every 6 hours as needed   Vitamin D2 1,250 mcg (50,000 unit) oral capsule 01/21/2021 take 1 capsule by oral route weekly for 90 days   Vitamin D3 1,000 unit oral capsule  take capsule by oral route daily   vitamin E 400 unit oral capsule  take 1 capsule by oral route daily   Zofran 8 mg oral tablet 12/18/2020 Take 1 q 6-8 hrs as needed         Allergy List  Allergen Name Date Reaction Notes   clindamycin HCl --  hives --    Egg allergy --  SOA --    erythromycin --  hvies --    Gluten --  --  --    Keflex --  hives --    Latex --  SOA --    Macrodantin --  hives --    PENICILLINS --  hives --          Family Medical History  Disease Name Relative/Age Notes   Breast Neoplasm, Malignant Mother/  Sister/   --    Cervical  Neoplasm, Malignant Sister/   --    Stroke Father/  Sister/   TIA   Heart Disease Father/  Mother/  Sister/   aunt or uncle not specified grandparents not specified   Congestive Heart Failure  grandparents not specified   Rheumatoid arthritis  --    Colon Cancer Mother/   --    Lung cancer Father/  Mother/   --    Prostate cancer Father/   --    Melanoma  aunt or uncle not specified   Bladder Cancer Father/   --    Diabetes Father/  Mother/   aunt or uncle not specified         Reproductive History  Menstrual   Age Menarche: 10 Menopause Status: Postmenopausal Age Menopause: 43   Pregnancy Summary   Total Pregnancies: 4 Full Term: 1 Premature: 1   Ab Induced: 0 Ab Spontaneous: 2 Ectopics: 0   Multiples: 0 Livin         Social History  Finding Status Start/Stop Quantity Notes   Alcohol Never --/-- --  --    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 --  --/-- --  --     --  --/-- --  --    Tobacco Never --/-- --  --          Immunizations  NameDate Admin Mfg Trade Name Lot Number Route Inj VIS Given VIS Publication   HepA2018 SKTARSHA HAVRIX-ADULT    2018   Comments:    Ddiuakchk83/ NE Not Entered  NE NE 10/19/2020    Comments: [employer] EMPLOYEE   Tdap2018 XIOMARA BOOSTRIX tf422   2018   Comments: ndc [id number]         Review of Systems  · Constitutional  o Denies  o : fever  · Cardiovascular  o Denies  o : chest pain, palpitations  · Respiratory  o Denies  o : shortness of breath, cough  · Musculoskeletal  o Admits  o : knee pain      Vitals  Date Time BP Position Site L\R Cuff Size HR RR TEMP (F) WT  HT  BMI kg/m2 BSA m2 O2 Sat FR L/min FiO2        2021 11:57 AM 96/52 Sitting    90 - R  97.3     97 %            Physical Examination  · Constitutional  o Appearance  o : well developed, well-nourished, in no acute distress  · Respiratory  o Respiratory Effort  o : breathing unlabored  o Auscultation of Lungs  o : clear to  ascultation  · Cardiovascular  o Heart  o :   § Auscultation of Heart  § : regular rate and rhythm  o Peripheral Vascular System  o :   § Extremities  § : no edema  · Musculoskeletal  o General  o :   § General Musculoskeletal  § : No joint deformity., Muscle tone, strength, and development grossly normal. Left medial knee tenderness and slight swelling, normal ROM, stable.  · Neurologic  o Gait and Station  o :   § Gait Screening  § : normal gait  · Psychiatric  o Mood and Affect  o : mood normal, affect appropriate          Assessment  · Left knee pain     719.46/M25.562      Plan  · Orders  o CMP [facility] (51011) - 719.46/M25.562 - 05/03/2021  o ACO-39: Current medications updated and reviewed (1159F, ) - - 05/03/2021  o Knee (Left) 3 views X-Ray [facility] (06682-CL) - 719.46/M25.562 - 05/03/2021   PAIN AND SWELLING FOR OVER A MONTH  o Uric Acid Serum [facility] (85887) - 719.46/M25.562 - 05/03/2021  · Medications  o Medications have been Reconciled  o Transition of Care or Provider Policy  · Instructions  o Patient was educated/instructed on their diagnosis, treatment and medications prior to discharge from the clinic today.  o Pt given hinged knee brace.            Electronically Signed by: Nicholas Doherty MD -Author on May 3, 2021 01:05:32 PM

## 2021-05-07 NOTE — PROGRESS NOTES
Progress Note      Patient Name: Josette Carcamo   Patient ID: [id number]   Sex: Female   YOB: 1965        Visit Date: August 24, 2019    Provider: Deepali Michele MD   Location: [facility]   Location Address: [address] [place] [address]   [place]  [id number]   Location Phone: [phone]          Chief Complaint     fell down wooden steps, back neck sides and coccyx hurting       History Of Present Illness  Josette Carcamo is a 54 year old /White female who presents for evaluation and treatment of:      When she fell down the steps the pain was so bad she almost passed out.  She has lightheadedness .  She went down the steps on her buttocks.  The pain was so bad that she couldn't even talk.  She got herself up and was white, shaky and scary.  Now her tailbone and lumbar spine are both uncomfortable.  When she lifts off she gets nausea.  Some of the bruises on her arms is probably from flailing trying to catch herself. She fell so hard that it knocked the urine out of her.  Initially she didn't feel that .  At first she couldn't breathe.  She had a strange pulling in her neck.  Her pain was severe enough that she needed blankets.  She felt shocky.  She did not hit her head.  She has known scoliosis and usually takes   Today she took 800mg.       Past Medical History  Disease Name Date Onset Notes   Allergic rhinitis, seasonal --  --    Anemia --  gestational   Asthma --  --    Broken Bones --  --    Chronic liver disease --  --    Colitis --  --    Constipation, chronic 07/23/2018 --    Dysphagia 07/23/2018 --    Edema 07/23/2018 --    Edema of both legs --  --    Fatigue 07/23/2018 --    Gestational Diabetes --  --    Heart murmur --  --    Hemorrhoids --  --    Hypertension --  gestational   Kidney stones --  --    Liver lesion --  --    Primary biliary cirrhosis --  --    RBBB --  --    Ringing in ears --  --    Scoliosis 07/23/2018 --    Shortness of breath  --  --    Urge Incontinence --  --    Vitamin D deficiency 07/23/2018 --          Past Surgical History  Procedure Name Date Notes   Cholecstectomy 2013 --    Colonoscopy --  --    Dilation and Curettage --  --    EGD (Endoscopy) --  --    Gum graft --  --    Hysterectomy --  --    Liver biopsy --  --    Thyroid biopsy --  --    Tubal ligation 1990 --    Vaginal surgery --  Yanet Sandrita Procedure for vaginal wall         Medication List  Name Date Started Instructions   doxycycline monohydrate 100 mg oral tablet 08/21/2019 take 1 tablet (100 mg) by oral route every 12 hours for 10 days   EpiPen 0.3 mg/0.3 mL injection auto-injector 08/21/2018 inject 0.3 milliliter (0.3 mg) by intramuscular route once as needed for anaphylaxis   hydrochlorothiazide 12.5 mg oral tablet 03/16/2019 TAKE ONE TABLET BY MOUTH DAILY   ipratropium bromide 42 mcg (0.06 %) nasal spray,non-aerosol 03/07/2018 1-2 sprays each nostril 3 times daily   Linzess 290 mcg oral capsule 09/10/2018 take 1 capsule (290 mcg) by oral route once daily on an empty stomach at least 30 minutes before 1st meal of the day for 90 days   Pepcid 40 mg oral tablet 08/14/2018 take 1 tablet (40 mg) by oral route once daily at bedtime   Probiotic 20 billion cell oral capsule  take 1 capsule by oral route daily   Salagen (pilocarpine) 5 mg oral tablet  take 1 tablet by oral route 2 times a day   Skelaxin 800 mg oral tablet 08/24/2019 take 1 tablet (800 mg) by oral route 3-4 times daily as needed for 30 days   sulfacetamide sodium 10 % ophthalmic (eye) drops 08/20/2019 instill 1 drop into right eye by ophthalmic route every 3 hours during the day and less frequently at night for 7 days   THEODORE Forte 500 mg oral tablet  take 1 tablet Q am and 2 tablets Q pm   Valtrex 1 gram oral tablet 04/29/2019 take 1 tablet (1,000 mg) by oral route once daily for 30 days   Ventolin HFA 90 mcg/actuation inhalation HFA aerosol inhaler 02/20/2018 inhale 1 puff (90 mcg) by inhalation route every  6 hours as needed   Vitamin D2 50,000 unit oral capsule 2019 take 1 capsule (50,000 unit) by oral route once weekly for 42 days   Vitamin D3 1,000 unit oral capsule  take capsule by oral route daily   vitamin E 400 unit oral capsule  take 1 capsule by oral route daily   Yuvafem 10 mcg vaginal tablet 2019 Insert one PV twice weekly         Allergy List  Allergen Name Date Reaction Notes   clindamycin HCl --  hives --    Egg allergy --  SOA --    erythromycin --  hvies --    Keflex --  hives --    Latex --  SOA --    Macrodantin --  hives --    PENICILLINS --  hives --          Family Medical History  Disease Name Relative/Age Notes   Breast Neoplasm, Malignant Mother/  Sister/   --    Cervical Neoplasm, Malignant Sister/   --    Stroke Father/  Sister/   TIA   Heart Disease Father/  Mother/  Sister/   aunt or uncle not specified grandparents not specified   Congestive Heart Failure  grandparents not specified   Colon Cancer Mother/   --    Lung cancer Father/  Mother/   --    Prostate cancer Father/   --    Melanoma  aunt or uncle not specified   Bladder Cancer Father/   --    Diabetes Father/  Mother/   aunt or uncle not specified         Reproductive History  Menstrual   Age Menarche: 10 Menopause Status: Postmenopausal Age Menopause: 43   Pregnancy Summary   Total Pregnancies: 4 Full Term: 1 Premature: 1   Ab Induced: 0 Ab Spontaneous: 2 Ectopics: 0   Multiples: 0 Livin         Social History  Finding Status Start/Stop Quantity Notes   Alcohol Never --/-- --  --    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 --  --/-- --  --     --  --/-- --  --    Tobacco Never --/-- --  --          Immunizations  NameDate Admin Mfg Trade Name Lot Number Route Inj VIS Given VIS Publication   HepA2018 SKB HAVRIX-ADULT  IM  2018   Comments:    Tdap2018 SKB BOOSTRIX [id number]   2018   Comments: ndc [id number]         Vitals  Date Time BP Position Site  L\R Cuff Size HR RR TEMP (F) WT  HT  BMI kg/m2 BSA m2 O2 Sat        08/24/2019 11:01 AM      70 - R  97.6 185lbs 6oz              Physical Examination  · Constitutional  o Appearance  o : well developed, well-nourished, appears to not feel well, talking a lot  · Eyes  o Conjunctivae  o : conjunctivae normal  · Neck  o Inspection/Palpation  o : supple She is moving her neck  o Thyroid  o : no thyromegaly  · Respiratory  o Respiratory Effort  o : breathing unlabored  o Auscultation of Lungs  o : clear to ascultation  · Cardiovascular  o Heart  o :   § Auscultation of Heart  § : regular rate and rhythm  o Peripheral Vascular System  o :   § Extremities  § : no edema  · Lymphatic  o Neck  o : no lymphadenopathy present  · Musculoskeletal  o General  o :   § General Musculoskeletal  § : Her tenderness is on the vertebrae from the neck down and it is worse in the lumbar  · Skin and Subcutaneous Tissue  o General Inspection  o : numerous bruises on her arms and legs  · Neurologic  o Gait and Station  o :   § Gait Screening  § : gait is slow and careful  · Psychiatric  o Mood and Affect  o : mood normal, affect appropriate              Assessment  · Back pain     724.5/M54.9  · Neck pain     723.1/M54.2      Plan  · Orders  o IOP - Urinalysis without Microscopy (Clinitek) [facility] ([id number]) - - 08/24/2019   GLU NEG, ALEXANDRA NEG, KET NEG, SG 1.010, BLO NEG, PH 5.5, PRO NEG, URO 0.2, NIT NEG, FREDDIE NEG  o ACO-39: Current medications updated and reviewed () - - 08/24/2019  o Thoracic Spine (3 view) [facility] ([id number]) - 724.5/M54.9 - 08/24/2019  o Lumbar Spine xray complete [facility] ([id number]) - 724.5/M54.9 - 08/24/2019  o Xray cervical spine complete [facility] ([id number]) - 723.1/M54.2 - 08/24/2019  o Depo-Medrol Injection 40mg () - 724.5/M54.9 - 08/24/2019   Injection - Depo-Medrol 40mg; Dose: 0.5; Site: Right Gluteus; Route: intramuscular; Date: 08/27/2019 11:02:37; Exp: 09/30/2020; Lot: [id number]; Mfg:  ALLA; TradeName: Depo-Medrol; Location: [facility]; Administered By: Neida Delgado MA; Comment: [id number]  · Medications  o Medrol (Jordi) 4 mg oral tablets,dose pack   SIG: take as directed for 6 days   DISP: (1) 21 ct dose-pack with 0 refills  Prescribed on 08/24/2019     o Skelaxin 800 mg oral tablet   SIG: take 1 tablet (800 mg) by oral route 3-4 times daily as needed for 30 days   DISP: (90) tablets with 1 refills  Adjusted on 08/24/2019     · Instructions  o Patient was educated/instructed on their diagnosis, treatment and medications prior to discharge from the clinic today.            Electronically Signed by: Deepali Michele MD -Author on August 27, 2019 01:42:24 PM

## 2021-05-09 VITALS
SYSTOLIC BLOOD PRESSURE: 96 MMHG | HEART RATE: 90 BPM | OXYGEN SATURATION: 97 % | DIASTOLIC BLOOD PRESSURE: 52 MMHG | TEMPERATURE: 97.3 F

## 2021-05-09 VITALS
DIASTOLIC BLOOD PRESSURE: 82 MMHG | HEART RATE: 84 BPM | HEIGHT: 62 IN | WEIGHT: 184 LBS | BODY MASS INDEX: 33.86 KG/M2 | SYSTOLIC BLOOD PRESSURE: 120 MMHG

## 2021-05-09 VITALS — SYSTOLIC BLOOD PRESSURE: 130 MMHG | DIASTOLIC BLOOD PRESSURE: 92 MMHG

## 2021-05-09 VITALS — SYSTOLIC BLOOD PRESSURE: 136 MMHG | DIASTOLIC BLOOD PRESSURE: 90 MMHG

## 2021-05-09 VITALS
DIASTOLIC BLOOD PRESSURE: 62 MMHG | HEIGHT: 62 IN | SYSTOLIC BLOOD PRESSURE: 122 MMHG | BODY MASS INDEX: 34.04 KG/M2 | WEIGHT: 185 LBS

## 2021-05-09 VITALS
SYSTOLIC BLOOD PRESSURE: 126 MMHG | HEART RATE: 83 BPM | OXYGEN SATURATION: 98 % | BODY MASS INDEX: 32.07 KG/M2 | HEIGHT: 63 IN | TEMPERATURE: 97.8 F | WEIGHT: 181 LBS | DIASTOLIC BLOOD PRESSURE: 82 MMHG

## 2021-05-09 VITALS
SYSTOLIC BLOOD PRESSURE: 122 MMHG | HEART RATE: 96 BPM | BODY MASS INDEX: 34.15 KG/M2 | WEIGHT: 185.56 LBS | DIASTOLIC BLOOD PRESSURE: 84 MMHG | OXYGEN SATURATION: 99 % | HEIGHT: 62 IN

## 2021-05-09 VITALS — HEART RATE: 70 BPM | WEIGHT: 185.37 LBS | TEMPERATURE: 97.6 F

## 2021-05-09 VITALS — SYSTOLIC BLOOD PRESSURE: 114 MMHG | DIASTOLIC BLOOD PRESSURE: 52 MMHG

## 2021-05-09 VITALS — DIASTOLIC BLOOD PRESSURE: 82 MMHG | SYSTOLIC BLOOD PRESSURE: 142 MMHG

## 2021-05-13 NOTE — PROGRESS NOTES
"   Progress Note      Patient Name: Josette Carcamo   Patient ID: [id number]   Sex: Female   YOB: 1965    Primary Care Provider: Nicholas Doherty MD   Referring Provider: Nicholas Doherty MD    Visit Date: November 12, 2020    Provider: Jayden Doherty MD   Location: [facility]   Location Address: [address] [place] [address]  [phone]          Chief Complaint     \"I've had some tinnitus\"       History Of Present Illness     Josette Carcamo is a 55 year old /White female with past medical history significant for primary biliary cirrhosis, celiac disease, chronic Karen-Barr viral infection, allergic rhinitis, and asthma who returns today for follow-up of thyroid nodules and hoarseness.  She was originally seen on 6/7/18 after 8-9 years of daily hoarseness.  Examination that day revealed evidence consistent with reflux versus allergies.  She was then seen on 10/4/2018 thyroid nodules that she had known about for years having previously undergone multiple ultrasounds and even an ultrasound-guided FNA in the past.  Her only previous FNA on record occurred on 8/29/05 and revealed the right nodule to be consistent with a cyst.  Thyroid ultrasound occurred on 9/24/18 and revealed a right 0.9 cm cyst, several tiny nodules measuring up to 0.5 cm, and a solid and cystic nodule measuring 0.8 cm.  She denies any family history of thyroid cancer or personal history of excessive radiation exposure.  She also discussed issues with dysphasia she had a significant work-up in the past without definitive diagnosis.  At that time we elected to continue observation of the thyroid. Repeat thyroid ultrasound on 10/29/2019 revealed a right solid and cystic 0.9 cm nodule which was previously 0.8 cm, a right 0.9 cm solid and cystic previously measuring 0.9 cm, a right 0.6 cm hypoechoic nodule previously measuring 0.5 cm, a right 0.5 cm hypoechoic nodule which was not previously " measured.    She returns today for follow-up having last been seen on 10/31/2019.  She tells me that she was recently started on a new medication for her primary biliary cirrhosis which is causing occasional throat burning relieved by Tums.  She also reports bilateral high-pitched tinnitus which fluctuates in severity.  She is unsure if her hearing has changed.  She does report an episode of vertigo which occurred while waking up in bed a few months ago.  This was intermittent throughout the rest of that day but has not been problematic since.  She feels like her hoarseness has improved.  Thyroid ultrasound on 11/2/2020 reveals multiple stable right-sided cystic and solid and cystic nodules.                  Past Medical History  Allergic rhinitis; Anaphylaxis; Asthma; Carpal tunnel syndrome of right wrist; Cervical radiculopathy; Cervicalgia; Dysphagia; Leg swelling; Liver lesion; Muscle cramps; Pancreatic lesion; PBC (primary biliary cirrhosis); Presbylarynx; Thyroid nodule; Ulnar neuropathy at elbow; Voice hoarseness         Past Surgical History  Bladder suspension; Carpal Tunnel Release; Cholecystecomy; D & C; Hysterectomy; Tonsilectomy; Tubal ligation         Medication List  EpiPen 0.3 mg/0.3 mL injection auto-injector; folic acid 1 mg oral tablet; hydrochlorothiazide 12.5 mg oral tablet; Linzess 290 mcg oral capsule; Ocaliva 5 mg oral tablet; Pepcid 40 mg oral tablet; Probiotic 5 billion cell oral capsule, sprinkle; ursodiol 500 mg oral tablet; Ventolin HFA 90 mcg/actuation inhalation HFA aerosol inhaler; Vitamin D3 1,000 unit oral tablet; vitamin E 400 unit oral capsule         Allergy List  CEPHALOSPORINS; clindamycin HCl; erythromycin; Latex; Macrodantin; PENICILLINS       Allergies Reconciled  Family Medical History  Cervical Neoplasm, Malignant; Colon Cancer; Lung cancer; Melanoma; Bladder Cancer; Family history of breast cancer; Family history of Arthritis; Family history of stroke; Family history of  "heart disease; Family history of diabetes mellitus; Family history of osteoporosis         Social History  Alcohol (Never); lives with spouse; ; Tobacco (Never); Working         Review of Systems  · Constitutional  o Denies  o : fever, night sweats, weight loss  · Eyes  o Admits  o : impaired vision  o Denies  o : discharge from eye  · HENT  o Admits  o : *See HPI  · Cardiovascular  o Denies  o : chest pain, irregular heart beats  · Respiratory  o Admits  o : shortness of breath  o Denies  o : wheezing, coughing up blood  · Gastrointestinal  o Admits  o : reflux  o Denies  o : heartburn, vomiting blood  · Genitourinary  o Denies  o : frequency  · Integument  o Admits  o : rash, skin dryness  · Neurologic  o Admits  o : dizziness  o Denies  o : seizures, loss of balance, loss of consciousness  · Endocrine  o Denies  o : cold intolerance, heat intolerance  · Heme-Lymph  o Admits  o : easy bleeding  o Denies  o : anemia      Vitals  Date Time BP Position Site L\R Cuff Size HR RR TEMP (F) WT  HT  BMI kg/m2 BSA m2 O2 Sat FR L/min FiO2        11/12/2020 09:33 AM        98.5 188lbs 6oz 5'  3\" 33.37 1.95             Physical Examination  · Constitutional  o Appearance  o : well developed, well-nourished, alert and in no acute distress, voice clear and strong  · Head and Face  o Head  o :   § Inspection  § : no deformities or lesions  o Face  o :   § Inspection  § : No facial lesions; House-Brackmann I/VI bilaterally  § Palpation  § : No TMJ crepitus nor  muscle tenderness bilaterally  · Eyes  o Vision  o :   § Visual Fields  § : Extraocular movements are intact. No spontaneous or gaze-induced nystagmus.  o Conjunctivae  o : clear  o Sclerae  o : clear  o Pupils and Irises  o : pupils equal, round, and reactive to light.   · Ears, Nose, Mouth and Throat  o Ears  o :   § External Ears  § : appearance within normal limits, no lesions present  § Otoscopic Examination  § : tympanic membrane appearance within " normal limits bilaterally without perforations, well-aerated middle ears  § Hearing  § : intact to conversational voice both ears  o Nose  o :   § External Nose  § : appearance normal  § Intranasal Exam  § : mucosa within normal limits, vestibules normal, no intranasal lesions present, septum midline, sinuses non tender to percussion  o Oral Cavity  o :   § Oral Mucosa  § : oral mucosa normal without pallor or cyanosis  § Lips  § : lip appearance normal  § Teeth  § : Worn dentition for age  § Gums  § : gums pink, non-swollen, no bleeding present  § Tongue  § : tongue appearance normal; normal mobility  § Palate  § : hard palate normal, soft palate appearance normal with symmetric mobility  o Throat  o :   § Oropharynx  § : no inflammation or lesions present, tonsils surgically absent  § Hypopharynx  § : Deferred secondary to gag reflex  § Larynx  § : Deferred secondary to gag reflex  · Neck  o Inspection/Palpation  o : normal appearance, no masses or tenderness, trachea midline; thyroid size normal, nontender, no nodules or masses present on palpation  · Respiratory  o Respiratory Effort  o : breathing unlabored  · Lymphatic  o Neck  o : no lymphadenopathy present  o Supraclavicular Nodes  o : no lymphadenopathy present  o Preauricular Nodes  o : no lymphadenopathy present  · Skin and Subcutaneous Tissue  o General Inspection  o : Regarding face and neck - there are no rashes present, no lesions present, and no areas of discoloration  · Neurologic  o Cranial Nerves  o : cranial nerves II-XII are grossly intact bilaterally  o Gait and Station  o : normal gait, able to stand without diffculty  · Psychiatric  o Judgement and Insight  o : judgment and insight intact  o Mood and Affect  o : mood normal, affect appropriate          Results     Patient: JACEK RICKS  : 1965 Acct#: [id number] MRN: [id number]  Location:[employer] Tech: JAYDEN LUA,  RDMS  Ordered By: CHICO GUPTA MD DOS:  11/02/2020  Cc Report : Exam Time: 12:52  PROCEDURE: US SOFT TISSUES OF THE HEAD AND NECK  COMPARISON: [facility], US, US SOFT TISSUES OF THE HEAD AND NECK,  10/29/2019, 12:22.  INDICATIONS: THYROID NODULE  TECHNIQUE: High-resolution ultrasound examination of the thyroid gland was performed.  FINDINGS:  RIGHT LOBE: 5.6 x 1.4 x 1.2 cm. There are stable right-sided cystic as well as cystic with mural nodule and  very small solid-appearing nodules of the right thyroid lobe.  LEFT LOBE: 4.1 x 0.9 x 1.1 cm. No visible mass, cyst, calcification, enlargement, or abnormal  echotexture.  ISTHMUS: The isthmus is 2 mm. No visible mass, cyst, calcification, enlargement, or abnormal  echotexture.  OTHER: None.  CONCLUSION:  1. Multiple right thyroid nodules are as before.  2. There are no suspicious thyroid nodules for which biopsy is required. Continued surveillance could be  considered.  ANNALISA PRUETT MD  Electronically Signed and Approved By: ANNALISA PRUETT MD on 11/02/2020 at 14:09       Assessment  · Thyroid nodule     241.0/E04.1  · Vertigo     780.4/R42  · Tinnitus, bilateral     388.30/H93.13      Plan  · Orders  o Ultrasound Thyroid. ([id number]) - 241.0/E04.1 - 11/12/2020  · Medications  o Medications have been Reconciled  o Transition of Care or Provider Policy  · Instructions  o Impressions and findings were discussed with Mrs. Carcamo at great length. Fortunately, her 11/2/2020 thyroid ultrasound demonstrated multiple right-sided cystic and mixed nodules which were stable in appearance. She does report bilateral high-pitched tinnitus and experienced an episode of vertigo a few months ago. She does not feel like her hearing is changed but we discussed the possibility of further evaluation with an audiogram. At this time, we will continue to follow her thyroid nodules with an ultrasound in 1 year and she will call if she experiences any further vertigo to undergo further work-up.            Electronically  Signed by: Jayden Doherty MD -Author on November 12, 2020 10:36:34 AM

## 2021-05-14 VITALS — BODY MASS INDEX: 33.38 KG/M2 | TEMPERATURE: 98.5 F | HEIGHT: 63 IN | WEIGHT: 188.37 LBS

## 2021-05-15 VITALS
BODY MASS INDEX: 33.02 KG/M2 | HEIGHT: 63 IN | TEMPERATURE: 98.3 F | RESPIRATION RATE: 15 BRPM | HEART RATE: 67 BPM | SYSTOLIC BLOOD PRESSURE: 118 MMHG | DIASTOLIC BLOOD PRESSURE: 66 MMHG | WEIGHT: 186.37 LBS | OXYGEN SATURATION: 96 %

## 2021-05-16 VITALS
HEIGHT: 63 IN | HEART RATE: 77 BPM | DIASTOLIC BLOOD PRESSURE: 75 MMHG | BODY MASS INDEX: 32.43 KG/M2 | WEIGHT: 183 LBS | SYSTOLIC BLOOD PRESSURE: 121 MMHG

## 2021-05-16 VITALS
WEIGHT: 184.5 LBS | HEART RATE: 90 BPM | RESPIRATION RATE: 16 BRPM | OXYGEN SATURATION: 99 % | DIASTOLIC BLOOD PRESSURE: 74 MMHG | TEMPERATURE: 99.3 F | HEIGHT: 63 IN | BODY MASS INDEX: 32.69 KG/M2 | SYSTOLIC BLOOD PRESSURE: 124 MMHG

## 2021-05-16 VITALS
WEIGHT: 184 LBS | HEIGHT: 63 IN | DIASTOLIC BLOOD PRESSURE: 74 MMHG | SYSTOLIC BLOOD PRESSURE: 126 MMHG | BODY MASS INDEX: 32.6 KG/M2 | HEART RATE: 74 BPM

## 2021-05-16 VITALS
SYSTOLIC BLOOD PRESSURE: 122 MMHG | HEIGHT: 63 IN | WEIGHT: 187 LBS | BODY MASS INDEX: 33.13 KG/M2 | DIASTOLIC BLOOD PRESSURE: 80 MMHG

## 2021-05-16 VITALS
HEART RATE: 81 BPM | WEIGHT: 194 LBS | HEIGHT: 62 IN | SYSTOLIC BLOOD PRESSURE: 139 MMHG | BODY MASS INDEX: 35.7 KG/M2 | DIASTOLIC BLOOD PRESSURE: 77 MMHG

## 2021-05-16 VITALS — HEIGHT: 63 IN | OXYGEN SATURATION: 100 % | BODY MASS INDEX: 34.2 KG/M2 | HEART RATE: 92 BPM | WEIGHT: 193 LBS

## 2021-05-16 VITALS
BODY MASS INDEX: 32.6 KG/M2 | SYSTOLIC BLOOD PRESSURE: 104 MMHG | WEIGHT: 184 LBS | DIASTOLIC BLOOD PRESSURE: 68 MMHG | HEART RATE: 77 BPM | TEMPERATURE: 98.4 F | OXYGEN SATURATION: 97 % | RESPIRATION RATE: 16 BRPM | HEIGHT: 63 IN

## 2021-05-16 VITALS
WEIGHT: 192 LBS | DIASTOLIC BLOOD PRESSURE: 66 MMHG | HEIGHT: 63 IN | HEART RATE: 79 BPM | BODY MASS INDEX: 34.02 KG/M2 | SYSTOLIC BLOOD PRESSURE: 114 MMHG

## 2021-05-22 ENCOUNTER — TRANSCRIBE ORDERS (OUTPATIENT)
Dept: ADMINISTRATIVE | Facility: HOSPITAL | Age: 56
End: 2021-05-22

## 2021-05-22 DIAGNOSIS — E04.1 THYROID NODULE: Primary | ICD-10-CM

## 2021-05-24 ENCOUNTER — HOSPITAL ENCOUNTER (OUTPATIENT)
Dept: FAMILY MEDICINE CLINIC | Facility: CLINIC | Age: 56
Discharge: HOME | End: 2021-05-24
Attending: FAMILY MEDICINE

## 2021-05-26 LAB — SARS-COV-2 AB SERPL QL IA: POSITIVE

## 2021-06-03 ENCOUNTER — TRANSCRIBE ORDERS (OUTPATIENT)
Dept: ADMINISTRATIVE | Facility: HOSPITAL | Age: 56
End: 2021-06-03

## 2021-06-03 DIAGNOSIS — K74.60 HEPATIC CIRRHOSIS, UNSPECIFIED HEPATIC CIRRHOSIS TYPE, UNSPECIFIED WHETHER ASCITES PRESENT (HCC): Primary | ICD-10-CM

## 2021-06-21 ENCOUNTER — CLINICAL SUPPORT (OUTPATIENT)
Dept: FAMILY MEDICINE CLINIC | Facility: CLINIC | Age: 56
End: 2021-06-21

## 2021-06-21 DIAGNOSIS — E55.9 VITAMIN D DEFICIENCY: Primary | ICD-10-CM

## 2021-06-21 DIAGNOSIS — Z79.899 DRUG THERAPY: ICD-10-CM

## 2021-06-21 LAB
25(OH)D3 SERPL-MCNC: 32 NG/ML
ALBUMIN SERPL-MCNC: 4.5 G/DL (ref 3.5–5.2)
ALBUMIN/GLOB SERPL: 2 G/DL
ALP SERPL-CCNC: 152 U/L (ref 39–117)
ALT SERPL W P-5'-P-CCNC: 23 U/L (ref 1–33)
ANION GAP SERPL CALCULATED.3IONS-SCNC: 9.2 MMOL/L (ref 5–15)
AST SERPL-CCNC: 26 U/L (ref 1–32)
BILIRUB SERPL-MCNC: 0.2 MG/DL (ref 0–1.2)
BUN SERPL-MCNC: 20 MG/DL (ref 6–20)
BUN/CREAT SERPL: 22.2 (ref 7–25)
CALCIUM SPEC-SCNC: 9.2 MG/DL (ref 8.6–10.5)
CHLORIDE SERPL-SCNC: 102 MMOL/L (ref 98–107)
CO2 SERPL-SCNC: 26.8 MMOL/L (ref 22–29)
CREAT SERPL-MCNC: 0.9 MG/DL (ref 0.57–1)
GFR SERPL CREATININE-BSD FRML MDRD: 65 ML/MIN/1.73
GLOBULIN UR ELPH-MCNC: 2.3 GM/DL
GLUCOSE SERPL-MCNC: 86 MG/DL (ref 65–99)
POTASSIUM SERPL-SCNC: 4.3 MMOL/L (ref 3.5–5.2)
PROT SERPL-MCNC: 6.8 G/DL (ref 6–8.5)
SODIUM SERPL-SCNC: 138 MMOL/L (ref 136–145)

## 2021-06-21 PROCEDURE — 80053 COMPREHEN METABOLIC PANEL: CPT | Performed by: FAMILY MEDICINE

## 2021-06-21 PROCEDURE — 82306 VITAMIN D 25 HYDROXY: CPT | Performed by: FAMILY MEDICINE

## 2021-06-21 NOTE — PROGRESS NOTES
Venipuncture Blood Specimen Collection  Venipuncture performed in R ARM by Shelli Arvizu with good hemostasis. Patient tolerated the procedure well without complications.   06/21/21   Shelli Arvizu

## 2021-06-23 ENCOUNTER — HOSPITAL ENCOUNTER (OUTPATIENT)
Dept: ULTRASOUND IMAGING | Facility: HOSPITAL | Age: 56
Discharge: HOME | End: 2021-06-23
Admitting: INTERNAL MEDICINE

## 2021-06-23 ENCOUNTER — APPOINTMENT (OUTPATIENT)
Dept: ULTRASOUND IMAGING | Facility: HOSPITAL | Age: 56
End: 2021-06-23

## 2021-06-23 DIAGNOSIS — K74.60 HEPATIC CIRRHOSIS, UNSPECIFIED HEPATIC CIRRHOSIS TYPE, UNSPECIFIED WHETHER ASCITES PRESENT (HCC): ICD-10-CM

## 2021-06-23 PROCEDURE — 76981 USE PARENCHYMA: CPT

## 2021-06-23 PROCEDURE — 76705 ECHO EXAM OF ABDOMEN: CPT

## 2021-07-19 DIAGNOSIS — K21.9 GASTROESOPHAGEAL REFLUX DISEASE WITHOUT ESOPHAGITIS: Primary | ICD-10-CM

## 2021-07-19 DIAGNOSIS — E53.8 FOLIC ACID DEFICIENCY: ICD-10-CM

## 2021-07-19 DIAGNOSIS — R60.9 EDEMA, UNSPECIFIED TYPE: ICD-10-CM

## 2021-07-19 DIAGNOSIS — K59.00 CONSTIPATION, UNSPECIFIED CONSTIPATION TYPE: ICD-10-CM

## 2021-07-19 DIAGNOSIS — K74.3 PRIMARY BILIARY CIRRHOSIS (HCC): ICD-10-CM

## 2021-07-19 RX ORDER — HYDROCHLOROTHIAZIDE 12.5 MG/1
TABLET ORAL
COMMUNITY
Start: 2016-07-30 | End: 2021-07-19 | Stop reason: SDUPTHER

## 2021-07-19 RX ORDER — FOLIC ACID 1 MG/1
TABLET ORAL
COMMUNITY
Start: 2020-02-29 | End: 2021-07-19 | Stop reason: SDUPTHER

## 2021-07-19 RX ORDER — FAMOTIDINE 40 MG/1
TABLET, FILM COATED ORAL
COMMUNITY
Start: 2021-01-21 | End: 2021-07-19 | Stop reason: SDUPTHER

## 2021-07-19 RX ORDER — HYDROCHLOROTHIAZIDE 12.5 MG/1
12.5 TABLET ORAL DAILY
Qty: 90 TABLET | Refills: 3 | Status: SHIPPED | OUTPATIENT
Start: 2021-07-19 | End: 2022-07-14 | Stop reason: SDUPTHER

## 2021-07-19 RX ORDER — FAMOTIDINE 40 MG/1
40 TABLET, FILM COATED ORAL NIGHTLY PRN
Qty: 90 TABLET | Refills: 3 | Status: SHIPPED | OUTPATIENT
Start: 2021-07-19 | End: 2022-08-23 | Stop reason: SDUPTHER

## 2021-07-19 RX ORDER — URSODIOL 500 MG/1
500 TABLET, FILM COATED ORAL 3 TIMES DAILY
Qty: 270 TABLET | Refills: 0 | Status: SHIPPED | OUTPATIENT
Start: 2021-07-19 | End: 2021-10-19 | Stop reason: SDUPTHER

## 2021-07-19 RX ORDER — URSODIOL 500 MG/1
TABLET, FILM COATED ORAL
COMMUNITY
Start: 2010-07-31 | End: 2021-07-19 | Stop reason: SDUPTHER

## 2021-07-19 RX ORDER — FOLIC ACID 1 MG/1
1 TABLET ORAL DAILY
Qty: 90 TABLET | Refills: 3 | Status: SHIPPED | OUTPATIENT
Start: 2021-07-19 | End: 2022-07-14 | Stop reason: SDUPTHER

## 2021-07-29 ENCOUNTER — OFFICE VISIT (OUTPATIENT)
Dept: FAMILY MEDICINE CLINIC | Facility: CLINIC | Age: 56
End: 2021-07-29

## 2021-07-29 VITALS
HEART RATE: 111 BPM | BODY MASS INDEX: 34.6 KG/M2 | SYSTOLIC BLOOD PRESSURE: 128 MMHG | DIASTOLIC BLOOD PRESSURE: 84 MMHG | OXYGEN SATURATION: 97 % | TEMPERATURE: 97.1 F | HEIGHT: 62 IN | WEIGHT: 188 LBS

## 2021-07-29 DIAGNOSIS — G56.21 ULNAR NEUROPATHY AT ELBOW OF RIGHT UPPER EXTREMITY: ICD-10-CM

## 2021-07-29 DIAGNOSIS — E04.1 THYROID NODULE: ICD-10-CM

## 2021-07-29 DIAGNOSIS — R49.0 VOICE HOARSENESS: ICD-10-CM

## 2021-07-29 DIAGNOSIS — U09.9 POST-COVID-19 CONDITION: ICD-10-CM

## 2021-07-29 DIAGNOSIS — Z79.899 DRUG THERAPY: ICD-10-CM

## 2021-07-29 DIAGNOSIS — K59.09 CONSTIPATION, CHRONIC: ICD-10-CM

## 2021-07-29 DIAGNOSIS — M51.36 DDD (DEGENERATIVE DISC DISEASE), LUMBAR: ICD-10-CM

## 2021-07-29 DIAGNOSIS — N32.81 OVERACTIVE BLADDER: ICD-10-CM

## 2021-07-29 DIAGNOSIS — E53.8 FOLIC ACID DEFICIENCY: ICD-10-CM

## 2021-07-29 DIAGNOSIS — M54.12 CERVICAL RADICULOPATHY: ICD-10-CM

## 2021-07-29 DIAGNOSIS — Z86.16 HISTORY OF COVID-19: ICD-10-CM

## 2021-07-29 DIAGNOSIS — M50.30 DDD (DEGENERATIVE DISC DISEASE), CERVICAL: ICD-10-CM

## 2021-07-29 DIAGNOSIS — K74.3 PRIMARY BILIARY CIRRHOSIS (HCC): ICD-10-CM

## 2021-07-29 DIAGNOSIS — E55.9 VITAMIN D DEFICIENCY: ICD-10-CM

## 2021-07-29 DIAGNOSIS — Z00.00 WELLNESS EXAMINATION: Primary | ICD-10-CM

## 2021-07-29 DIAGNOSIS — R60.9 EDEMA, UNSPECIFIED TYPE: ICD-10-CM

## 2021-07-29 DIAGNOSIS — K21.9 GASTROESOPHAGEAL REFLUX DISEASE, UNSPECIFIED WHETHER ESOPHAGITIS PRESENT: ICD-10-CM

## 2021-07-29 PROBLEM — M41.9 SCOLIOSIS: Status: ACTIVE | Noted: 2018-07-23

## 2021-07-29 PROBLEM — J45.909 ASTHMA: Status: ACTIVE | Noted: 2021-07-29

## 2021-07-29 PROBLEM — G56.20 ULNAR NEUROPATHY AT ELBOW: Status: ACTIVE | Noted: 2019-02-21

## 2021-07-29 PROBLEM — N39.3 STRESS INCONTINENCE IN FEMALE: Status: ACTIVE | Noted: 2021-07-29

## 2021-07-29 PROBLEM — B25.9: Status: ACTIVE | Noted: 2021-07-29

## 2021-07-29 PROBLEM — R53.83 FATIGUE: Status: ACTIVE | Noted: 2018-07-23

## 2021-07-29 PROBLEM — Z91.018 MULTIPLE FOOD ALLERGIES: Status: ACTIVE | Noted: 2021-07-29

## 2021-07-29 PROBLEM — T78.2XXA ANAPHYLAXIS: Status: ACTIVE | Noted: 2021-07-29

## 2021-07-29 PROBLEM — J38.7 PRESBYLARYNX: Status: ACTIVE | Noted: 2021-07-29

## 2021-07-29 LAB — 25(OH)D3 SERPL-MCNC: 30 NG/ML

## 2021-07-29 PROCEDURE — 86769 SARS-COV-2 COVID-19 ANTIBODY: CPT | Performed by: NURSE PRACTITIONER

## 2021-07-29 PROCEDURE — 82306 VITAMIN D 25 HYDROXY: CPT | Performed by: FAMILY MEDICINE

## 2021-07-29 PROCEDURE — 99396 PREV VISIT EST AGE 40-64: CPT | Performed by: NURSE PRACTITIONER

## 2021-07-29 PROCEDURE — 36415 COLL VENOUS BLD VENIPUNCTURE: CPT | Performed by: NURSE PRACTITIONER

## 2021-07-29 RX ORDER — VITAMIN E 268 MG
1 CAPSULE ORAL DAILY
COMMUNITY

## 2021-07-29 RX ORDER — MELATONIN
1 DAILY
COMMUNITY
Start: 2015-06-20

## 2021-07-29 RX ORDER — GREEN TEA/HOODIA GORDONII 315-12.5MG
1 CAPSULE ORAL DAILY
COMMUNITY

## 2021-07-29 RX ORDER — ALBUTEROL SULFATE 90 UG/1
1-2 AEROSOL, METERED RESPIRATORY (INHALATION) AS NEEDED
COMMUNITY
Start: 2000-06-20 | End: 2021-12-03 | Stop reason: SDUPTHER

## 2021-07-29 RX ORDER — EPINEPHRINE 0.3 MG/.3ML
0.3 INJECTION SUBCUTANEOUS AS NEEDED
COMMUNITY
Start: 2003-10-01

## 2021-07-29 RX ORDER — OBETICHOLIC ACID 5 MG/1
1 TABLET, FILM COATED ORAL DAILY
COMMUNITY
Start: 2020-02-01 | End: 2022-06-08

## 2021-07-29 NOTE — PROGRESS NOTES
Chief Complaint  Annual Exam    Subjective          Josette Carcamo presents to [facility]  Patient presents for routine physical.  She reports that she is doing well and has no complaints.  Review of her chronic health conditions:    PBC: She sees Dr. Bryson routinely.  She talks to them about every 3 months and does labs monthly per Dr. Doherty in this office.  She has recently had a FibroScan and an ultrasound.  Her liver enzymes had elevated significantly again but are currently trending back down.  She still experiences a lot of symptoms from her PBC including bruising, itching, dry mouth, constipation, etc.  Overall she considers and her GI considers her condition stable.    Idiopathic constipation: She is using Linzess which tends to control her symptoms and works well    Edema: Her edema is primarily due to PBC.  She takes diuretic as needed.  She always has a little bit of pitting edema in lower extremities and wears support stockings when she works or walks extensively.    Vitamin D deficiency: She is currently using over-the-counter medication.    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  She still bruises easily.  Labs remained stable.    GERD: GI is diagnosed her with silent GERD contributing to her hoarseness.  She routinely uses Pepcid.    Thyroid nodules: She sees Dr. Doherty ENT who monitors her thyroid nodules.  She has a follow-up with him in October or November.  He also follows for chronic hoarseness.  There are no thyroid medications being used.  She checks TSH occasionally.  One of her GI meds also has a side effect of possibly causing hypothyroid.    DDD: She suffers with the DDD in the spine and neck.  She reports that she has pain every single day.  She just deals with it.  She does not take any medication.  She performs stretching and and is trying to lose weight.  Her ursodiol has a side effect of weight gain which contributes to her  difficulty losing weight.  She is trying to use a weight watchers type diet and tries to exercise as much as possible.    Neuropathy: She has significant neuropathy in her hands and arms.  She had an ulnar decompression and carpal tunnel surgery on her right arm but it still bothers her enough to keep her up at night.      Overactive bladder: She has been trying to control the symptom with diet, no meds.  She has given up everything recommended except coffee which she limits to 1 cup a day.      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  She develops left eye twitch and as smells like sulfur.  Sometimes they are debilitating.  When she identifies the symptoms, she tries to take 1 Excedrin which seems to minimize the symptoms and sometimes aborts it.  When it is extreme it helps to sit in a recliner.  After she has experienced a really bad one the brain seems to shut down.  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  She reports the pain follows the trigeminal nerve on the left side.  She continues to have positive Covid antibodies when last checked.  It was considered possible to administer mother no vaccine, however, her liver enzymes were significantly elevated at that time and it was decided not to proceed.  She has also read in the literature that with PBC many patients do not seroconvert, making it questionable to take the vaccine anyway.  She reports the headaches have appeared to be less frequent.  They have decreased to about 1 time monthly, she is excited because no headache in the month of July.    She reports experiencing increased stress recently due to changes at work.  She has been staying over consistently and arriving home late and exhausted.  She has missed her routine supportive Sikh due to inability to participate in her regular activities.  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  She reports that she is mentally, emotionally, and physically drained.  She has a few days off this week but she is looking forward to.  She is also looking forward to a family [place] trip in 2022 during fall break.    She plans to have employee health labs collected today.  May need to add an order for TSH, vitamin D and antibodies for Covid.    She reports she is up-to-date with routine preventive care.  Mammogram is due at the end of this year.  She recently had a colonoscopy, in 2020, and is not due for 10 years.  She is due for a Yanet Sandrita procedure to assist with intercourse comfort.  She does have procedure annually, it also helps with her bladder issues, she will schedule that soon.                        Past Medical History:   Diagnosis Date   • Allergic rhinitis    • Anaphylaxis     Food allergies    • Asthma    • Carpal tunnel syndrome of right wrist 01/31/2019   • Cervical radiculopathy 01/03/2019   • Cervicalgia 01/03/2019   • Dysphagia    • Leg swelling    • Liver lesion    • Muscle cramps    • Pancreatic lesion    • Presbylarynx    • Primary biliary cirrhosis (CMS/HCC)    • Thyroid nodule    • Ulnar neuropathy at elbow 02/21/2019    Symptoms into ulnar distribution    • Voice hoarseness        Allergies   Allergen Reactions   • Cephalexin Hives   • Clindamycin Hcl Hives   • Erythromycin Hives   • Gluten Meal Hives   • Latex Shortness Of Breath and Swelling   • Penicillins Hives   • Tetracycline Hives        Past Surgical History:   Procedure Laterality Date   • BLADDER SUSPENSION      Age 32   • CARPAL TUNNEL RELEASE  02/08/2019    Right CTR & right ulnar nerve decompression   • CHOLECYSTECTOMY     • DILATATION AND CURETTAGE      Multiple    • HYSTERECTOMY      Partial   • TONSILLECTOMY      Age 14   • TUBAL ABDOMINAL LIGATION      Age 27        Social History     Socioeconomic History   • Marital status:      Spouse name: Not on file   • Number of  children: Not on file   • Years of education: Not on file   • Highest education level: Not on file   Tobacco Use   • Smoking status: Never Smoker   • Smokeless tobacco: Never Used   Vaping Use   • Vaping Use: Never used   Substance and Sexual Activity   • Alcohol use: Never       Family History   Problem Relation Age of Onset   • Cervical cancer Mother    • Lung cancer Mother    • Arthritis Mother    • Heart disease Mother    • Diabetes Mother    • Osteoporosis Mother    • Lung cancer Father    • Other Father         Bladder cancer    • Arthritis Father    • Stroke Father    • Heart disease Father    • Diabetes Father    • Cervical cancer Sister    • Breast cancer Sister    • Arthritis Sister    • Stroke Sister    • Breast cancer Maternal Grandmother    • Colon cancer Other    • Melanoma Other    • Breast cancer Other         Health Maintenance Due   Topic Date Due   • COLORECTAL CANCER SCREENING  Never done   • Pneumococcal Vaccine 0-64 (1 of 2 - PPSV23) Never done   • COVID-19 Vaccine (1) Never done   • Hepatitis B (1 of 3 - Risk 3-dose series) Never done   • ZOSTER VACCINE (1 of 2) Never done   • HEPATITIS C SCREENING  Never done        Last Completed Pap Smear     This patient has no relevant Health Maintenance data.          Last Completed Mammogram          MAMMOGRAM (Every 2 Years) Next due on 12/18/2022 12/18/2020  Mammo Screening Digital Tomosynthesis Bilateral With CAD    12/16/2019  Mammo Screening Digital Tomosynthesis Bilateral With CAD    10/29/2019  Outside Procedure: HC MAMMOGRAM SCREENING BILAT DIGITAL W CAD    11/08/2018  Mammo Screening Digital Tomosynthesis Bilateral With CAD    07/23/2018  Outside Procedure: INACTIVE -HC MAMMOGRAM SCREENING BILAT DIGITAL W CAD,CHG SCREENING DIGITAL BREAST TOMOSYNTHESIS BI                Last Completed Colonoscopy     This patient has no relevant Health Maintenance data.          Current Outpatient Medications on File Prior to Visit   Medication Sig   •  "albuterol sulfate HFA (Ventolin HFA) 108 (90 Base) MCG/ACT inhaler Inhale 1-2 puffs As Needed.   • cholecalciferol (Cholecalciferol) 25 MCG (1000 UT) tablet Take 1 tablet by mouth Daily.   • EPINEPHrine (EpiPen 2-Jordi) 0.3 MG/0.3ML solution auto-injector injection Inject 0.3 mL under the skin into the appropriate area as directed As Needed.   • famotidine (Pepcid) 40 MG tablet Take 1 tablet by mouth At Night As Needed for Heartburn.   • folic acid (FOLVITE) 1 MG tablet Take 1 tablet by mouth Daily.   • hydroCHLOROthiazide (HYDRODIURIL) 12.5 MG tablet Take 1 tablet by mouth Daily.   • Lactobacillus (Probiotic Acidophilus) tablet Take 1 tablet by mouth Daily.   • linaclotide (Linzess) 290 MCG capsule capsule Take 1 capsule by mouth Every Morning Before Breakfast.   • Obeticholic Acid (Ocaliva) 5 MG tablet Take 1 tablet by mouth Daily.   • ursodiol (Aniceto Forte) 500 MG tablet Take 1 tablet by mouth 3 (Three) Times a Day.   • vitamin E 400 UNIT capsule Take 1 capsule by mouth Daily.     No current facility-administered medications on file prior to visit.       Immunization History   Administered Date(s) Administered   • Hepatitis A 07/23/2018   • Influenza, Unspecified 10/19/2020   • Tdap 01/26/2018       [facility]  Review of Systems   Constitutional: Positive for fatigue.   HENT: Negative.    Eyes: Negative.    Respiratory: Negative.    Cardiovascular: Positive for leg swelling. Negative for chest pain and palpitations.   Gastrointestinal: Positive for constipation.   Endocrine: Negative.    Genitourinary: Positive for urinary incontinence.        Dysparenuia   Musculoskeletal: Positive for arthralgias, back pain and neck pain.   Skin: Positive for dry skin and bruise.   Neurological: Positive for headache.        Objective     /84   Pulse 111   Temp 97.1 °F (36.2 °C)   Ht 157.5 cm (62\")   Wt 85.3 kg (188 lb)   SpO2 97%   BMI 34.39 kg/m²         Physical Exam  Vitals and nursing note reviewed. "   Constitutional:       General: She is not in acute distress.     Appearance: Normal appearance.   HENT:      Head: Normocephalic.      Right Ear: Tympanic membrane, ear canal and external ear normal.      Left Ear: Tympanic membrane, ear canal and external ear normal.      Mouth/Throat:      Mouth: Mucous membranes are moist.      Pharynx: Oropharynx is clear.   Eyes:      Pupils: Pupils are equal, round, and reactive to light.   Cardiovascular:      Rate and Rhythm: Normal rate and regular rhythm.      Pulses: Normal pulses.      Heart sounds: Normal heart sounds.   Pulmonary:      Effort: Pulmonary effort is normal.      Breath sounds: Normal breath sounds.   Chest:      Breasts:         Right: Normal.         Left: Normal.   Abdominal:      General: Abdomen is flat. Bowel sounds are normal.      Palpations: Abdomen is soft.   Musculoskeletal:      Cervical back: Normal range of motion.   Lymphadenopathy:      Upper Body:      Right upper body: No supraclavicular, axillary or pectoral adenopathy.      Left upper body: No supraclavicular, axillary or pectoral adenopathy.   Skin:     General: Skin is warm and dry.      Findings: Bruising present.   Neurological:      Mental Status: She is alert and oriented to person, place, and time.   Psychiatric:         Mood and Affect: Mood normal.           Result Review :                           Assessment and Plan        Diagnoses and all orders for this visit:    1. Wellness examination (Primary)    2. Ulnar neuropathy at elbow of right upper extremity    3. Vitamin D deficiency  -     Vitamin D 25 hydroxy    4. Drug therapy  -     Vitamin D 25 hydroxy    5. History of COVID-19  -     SARS-CoV-2 Antibodies (Roche); Future  -     SARS-CoV-2 Antibodies (Roche)    6. Voice hoarseness    7. Thyroid nodule    8. Constipation, chronic    9. Gastroesophageal reflux disease, unspecified whether esophagitis present    10. Primary biliary cirrhosis (CMS/HCC)    11. Cervical  radiculopathy    12. Edema, unspecified type    13. Overactive bladder    14. DDD (degenerative disc disease), cervical    15. DDD (degenerative disc disease), lumbar    16. Folic acid deficiency    17. Post-COVID-19 condition              Follow Up     Return in about 1 year (around 7/29/2022), or if symptoms worsen or fail to improve.    Patient was given instructions and counseling regarding her condition or for health maintenance advice. Please see specific information pulled into the AVS if appropriate.     All chronic conditions appear to be stable at this time.  She will continue with weight loss attempts utilizing diet and exercise.    No medications need to be refilled at this time.

## 2021-07-30 ENCOUNTER — DOCUMENTATION (OUTPATIENT)
Dept: FAMILY MEDICINE CLINIC | Facility: CLINIC | Age: 56
End: 2021-07-30

## 2021-07-30 LAB — SARS-COV-2 AB SERPL QL IA: POSITIVE

## 2021-08-02 NOTE — PROGRESS NOTES
Venipuncture Blood Specimen Collection  Venipuncture performed in right arm by Lynnette Garcia with good hemostasis. Patient tolerated the procedure well without complications.   07/29/21   Lynnette Garcia     Chief Complaint   Patient presents with   [address] Follow Up     [facility] 5/2021 CHF    Knee Pain     left knee- pt requesting cortisone inj     Mole     pt reports raised moles on back     Skin Problem     red spots/bumps all over body  x 2-3 months        1. Have you been to the ER, urgent care clinic since your last visit? Hospitalized since your last visit? Yes When: 05/30/2021 Where: [facility] Reason for visit: CHF    2. Have you seen or consulted any other health care providers outside of the [address] [place] [address] since your last visit? Include any pap smears or colon screening.  No

## 2021-08-18 PROCEDURE — U0003 INFECTIOUS AGENT DETECTION BY NUCLEIC ACID (DNA OR RNA); SEVERE ACUTE RESPIRATORY SYNDROME CORONAVIRUS 2 (SARS-COV-2) (CORONAVIRUS DISEASE [COVID-19]), AMPLIFIED PROBE TECHNIQUE, MAKING USE OF HIGH THROUGHPUT TECHNOLOGIES AS DESCRIBED BY CMS-2020-01-R: HCPCS | Performed by: FAMILY MEDICINE

## 2021-08-19 ENCOUNTER — CLINICAL SUPPORT (OUTPATIENT)
Dept: FAMILY MEDICINE CLINIC | Facility: CLINIC | Age: 56
End: 2021-08-19

## 2021-08-19 DIAGNOSIS — Z00.00 WELLNESS EXAMINATION: ICD-10-CM

## 2021-08-19 DIAGNOSIS — N18.9 CHRONIC KIDNEY DISEASE, UNSPECIFIED CKD STAGE: ICD-10-CM

## 2021-08-19 DIAGNOSIS — K74.3 PRIMARY BILIARY CIRRHOSIS (HCC): Primary | ICD-10-CM

## 2021-08-19 LAB
BASOPHILS # BLD AUTO: 0.02 10*3/MM3 (ref 0–0.2)
BASOPHILS NFR BLD AUTO: 0.4 % (ref 0–1.5)
DEPRECATED RDW RBC AUTO: 43.9 FL (ref 37–54)
EOSINOPHIL # BLD AUTO: 0.27 10*3/MM3 (ref 0–0.4)
EOSINOPHIL NFR BLD AUTO: 5.5 % (ref 0.3–6.2)
ERYTHROCYTE [DISTWIDTH] IN BLOOD BY AUTOMATED COUNT: 13.2 % (ref 12.3–15.4)
HCT VFR BLD AUTO: 38.8 % (ref 34–46.6)
HGB BLD-MCNC: 12.3 G/DL (ref 12–15.9)
IMM GRANULOCYTES # BLD AUTO: 0.01 10*3/MM3 (ref 0–0.05)
IMM GRANULOCYTES NFR BLD AUTO: 0.2 % (ref 0–0.5)
LYMPHOCYTES # BLD AUTO: 1.62 10*3/MM3 (ref 0.7–3.1)
LYMPHOCYTES NFR BLD AUTO: 32.9 % (ref 19.6–45.3)
MCH RBC QN AUTO: 28.4 PG (ref 26.6–33)
MCHC RBC AUTO-ENTMCNC: 31.7 G/DL (ref 31.5–35.7)
MCV RBC AUTO: 89.6 FL (ref 79–97)
MONOCYTES # BLD AUTO: 0.37 10*3/MM3 (ref 0.1–0.9)
MONOCYTES NFR BLD AUTO: 7.5 % (ref 5–12)
NEUTROPHILS NFR BLD AUTO: 2.63 10*3/MM3 (ref 1.7–7)
NEUTROPHILS NFR BLD AUTO: 53.5 % (ref 42.7–76)
NRBC BLD AUTO-RTO: 0 /100 WBC (ref 0–0.2)
PLATELET # BLD AUTO: 326 10*3/MM3 (ref 140–450)
PMV BLD AUTO: 10.7 FL (ref 6–12)
RBC # BLD AUTO: 4.33 10*6/MM3 (ref 3.77–5.28)
WBC # BLD AUTO: 4.92 10*3/MM3 (ref 3.4–10.8)

## 2021-08-19 PROCEDURE — 36415 COLL VENOUS BLD VENIPUNCTURE: CPT | Performed by: FAMILY MEDICINE

## 2021-08-19 PROCEDURE — 80053 COMPREHEN METABOLIC PANEL: CPT | Performed by: FAMILY MEDICINE

## 2021-08-19 PROCEDURE — 85025 COMPLETE CBC W/AUTO DIFF WBC: CPT | Performed by: FAMILY MEDICINE

## 2021-08-19 NOTE — PROGRESS NOTES
Venipuncture Blood Specimen Collection  Venipuncture performed in RIGHT ARM by Lynnette Garcia with good hemostasis. Patient tolerated the procedure well without complications.   08/19/21   Lynnette Garcia

## 2021-08-20 LAB
ALBUMIN SERPL-MCNC: 4.4 G/DL (ref 3.5–5.2)
ALBUMIN/GLOB SERPL: 1.6 G/DL
ALP SERPL-CCNC: 178 U/L (ref 39–117)
ALT SERPL W P-5'-P-CCNC: 25 U/L (ref 1–33)
ANION GAP SERPL CALCULATED.3IONS-SCNC: 8.5 MMOL/L (ref 5–15)
AST SERPL-CCNC: 26 U/L (ref 1–32)
BILIRUB SERPL-MCNC: <0.2 MG/DL (ref 0–1.2)
BUN SERPL-MCNC: 20 MG/DL (ref 6–20)
BUN/CREAT SERPL: 18.7 (ref 7–25)
CALCIUM SPEC-SCNC: 9.2 MG/DL (ref 8.6–10.5)
CHLORIDE SERPL-SCNC: 103 MMOL/L (ref 98–107)
CO2 SERPL-SCNC: 31.5 MMOL/L (ref 22–29)
CREAT SERPL-MCNC: 1.07 MG/DL (ref 0.57–1)
GFR SERPL CREATININE-BSD FRML MDRD: 53 ML/MIN/1.73
GLOBULIN UR ELPH-MCNC: 2.7 GM/DL
GLUCOSE SERPL-MCNC: 86 MG/DL (ref 65–99)
POTASSIUM SERPL-SCNC: 4.1 MMOL/L (ref 3.5–5.2)
PROT SERPL-MCNC: 7.1 G/DL (ref 6–8.5)
SODIUM SERPL-SCNC: 143 MMOL/L (ref 136–145)

## 2021-08-23 NOTE — PROGRESS NOTES
Labs are stable but kidney function not really any better.  Let me know if I need to get you into nephrology.

## 2021-08-31 DIAGNOSIS — H66.90 ACUTE OTITIS MEDIA, UNSPECIFIED OTITIS MEDIA TYPE: Primary | ICD-10-CM

## 2021-08-31 RX ORDER — SULFAMETHOXAZOLE AND TRIMETHOPRIM 800; 160 MG/1; MG/1
1 TABLET ORAL 2 TIMES DAILY
Qty: 14 TABLET | Refills: 0 | Status: SHIPPED | OUTPATIENT
Start: 2021-08-31 | End: 2021-09-07

## 2021-09-03 DIAGNOSIS — Z86.16 HISTORY OF COVID-19: Primary | ICD-10-CM

## 2021-09-07 ENCOUNTER — LAB (OUTPATIENT)
Dept: FAMILY MEDICINE CLINIC | Facility: CLINIC | Age: 56
End: 2021-09-07

## 2021-09-07 PROCEDURE — 86769 SARS-COV-2 COVID-19 ANTIBODY: CPT | Performed by: FAMILY MEDICINE

## 2021-09-08 LAB
SARS-COV-2 AB SERPL IA-ACNC: 131.5 U/ML
SARS-COV-2 AB SERPL-IMP: POSITIVE

## 2021-09-20 DIAGNOSIS — Z79.899 DRUG THERAPY: Primary | ICD-10-CM

## 2021-09-20 LAB
ALBUMIN SERPL-MCNC: 4.5 G/DL (ref 3.5–5.2)
ALBUMIN/GLOB SERPL: 1.8 G/DL
ALP SERPL-CCNC: 194 U/L (ref 39–117)
ALT SERPL W P-5'-P-CCNC: 27 U/L (ref 1–33)
ANION GAP SERPL CALCULATED.3IONS-SCNC: 9.9 MMOL/L (ref 5–15)
AST SERPL-CCNC: 28 U/L (ref 1–32)
BASOPHILS # BLD AUTO: 0.03 10*3/MM3 (ref 0–0.2)
BASOPHILS NFR BLD AUTO: 0.5 % (ref 0–1.5)
BILIRUB SERPL-MCNC: <0.2 MG/DL (ref 0–1.2)
BUN SERPL-MCNC: 17 MG/DL (ref 6–20)
BUN/CREAT SERPL: 18.9 (ref 7–25)
CALCIUM SPEC-SCNC: 9.3 MG/DL (ref 8.6–10.5)
CHLORIDE SERPL-SCNC: 104 MMOL/L (ref 98–107)
CO2 SERPL-SCNC: 26.1 MMOL/L (ref 22–29)
CREAT SERPL-MCNC: 0.9 MG/DL (ref 0.57–1)
DEPRECATED RDW RBC AUTO: 44.1 FL (ref 37–54)
EOSINOPHIL # BLD AUTO: 0.46 10*3/MM3 (ref 0–0.4)
EOSINOPHIL NFR BLD AUTO: 8 % (ref 0.3–6.2)
ERYTHROCYTE [DISTWIDTH] IN BLOOD BY AUTOMATED COUNT: 13.2 % (ref 12.3–15.4)
GFR SERPL CREATININE-BSD FRML MDRD: 64 ML/MIN/1.73
GLOBULIN UR ELPH-MCNC: 2.5 GM/DL
GLUCOSE SERPL-MCNC: 87 MG/DL (ref 65–99)
HCT VFR BLD AUTO: 39.6 % (ref 34–46.6)
HGB BLD-MCNC: 12.6 G/DL (ref 12–15.9)
IMM GRANULOCYTES # BLD AUTO: 0.01 10*3/MM3 (ref 0–0.05)
IMM GRANULOCYTES NFR BLD AUTO: 0.2 % (ref 0–0.5)
LYMPHOCYTES # BLD AUTO: 1.67 10*3/MM3 (ref 0.7–3.1)
LYMPHOCYTES NFR BLD AUTO: 29 % (ref 19.6–45.3)
MCH RBC QN AUTO: 28.9 PG (ref 26.6–33)
MCHC RBC AUTO-ENTMCNC: 31.8 G/DL (ref 31.5–35.7)
MCV RBC AUTO: 90.8 FL (ref 79–97)
MONOCYTES # BLD AUTO: 0.38 10*3/MM3 (ref 0.1–0.9)
MONOCYTES NFR BLD AUTO: 6.6 % (ref 5–12)
NEUTROPHILS NFR BLD AUTO: 3.2 10*3/MM3 (ref 1.7–7)
NEUTROPHILS NFR BLD AUTO: 55.7 % (ref 42.7–76)
NRBC BLD AUTO-RTO: 0 /100 WBC (ref 0–0.2)
PLATELET # BLD AUTO: 351 10*3/MM3 (ref 140–450)
PMV BLD AUTO: 10.3 FL (ref 6–12)
POTASSIUM SERPL-SCNC: 4 MMOL/L (ref 3.5–5.2)
PROT SERPL-MCNC: 7 G/DL (ref 6–8.5)
RBC # BLD AUTO: 4.36 10*6/MM3 (ref 3.77–5.28)
SODIUM SERPL-SCNC: 140 MMOL/L (ref 136–145)
WBC # BLD AUTO: 5.75 10*3/MM3 (ref 3.4–10.8)

## 2021-09-20 PROCEDURE — 80053 COMPREHEN METABOLIC PANEL: CPT | Performed by: FAMILY MEDICINE

## 2021-09-20 PROCEDURE — 85025 COMPLETE CBC W/AUTO DIFF WBC: CPT | Performed by: FAMILY MEDICINE

## 2021-10-19 DIAGNOSIS — K74.3 PRIMARY BILIARY CIRRHOSIS (HCC): ICD-10-CM

## 2021-10-19 RX ORDER — URSODIOL 500 MG/1
500 TABLET, FILM COATED ORAL 3 TIMES DAILY
Qty: 270 TABLET | Refills: 0 | Status: SHIPPED | OUTPATIENT
Start: 2021-10-19 | End: 2021-12-01 | Stop reason: SDUPTHER

## 2021-10-21 ENCOUNTER — CLINICAL SUPPORT (OUTPATIENT)
Dept: FAMILY MEDICINE CLINIC | Facility: CLINIC | Age: 56
End: 2021-10-21

## 2021-10-21 DIAGNOSIS — R53.83 FATIGUE, UNSPECIFIED TYPE: ICD-10-CM

## 2021-10-21 DIAGNOSIS — E55.9 VITAMIN D DEFICIENCY: ICD-10-CM

## 2021-10-21 DIAGNOSIS — Z79.899 DRUG THERAPY: Primary | ICD-10-CM

## 2021-10-21 DIAGNOSIS — Z79.899 MEDICATION MANAGEMENT: ICD-10-CM

## 2021-10-21 DIAGNOSIS — M79.10 MUSCLE PAIN: ICD-10-CM

## 2021-10-21 LAB
25(OH)D3 SERPL-MCNC: 32.9 NG/ML (ref 30–100)
ALBUMIN SERPL-MCNC: 4.6 G/DL (ref 3.5–5.2)
ALBUMIN/GLOB SERPL: 1.8 G/DL
ALP SERPL-CCNC: 184 U/L (ref 39–117)
ALT SERPL W P-5'-P-CCNC: 33 U/L (ref 1–33)
ANION GAP SERPL CALCULATED.3IONS-SCNC: 10.8 MMOL/L (ref 5–15)
AST SERPL-CCNC: 30 U/L (ref 1–32)
BASOPHILS # BLD AUTO: 0.02 10*3/MM3 (ref 0–0.2)
BASOPHILS NFR BLD AUTO: 0.4 % (ref 0–1.5)
BILIRUB SERPL-MCNC: 0.2 MG/DL (ref 0–1.2)
BUN SERPL-MCNC: 17 MG/DL (ref 6–20)
BUN/CREAT SERPL: 16.5 (ref 7–25)
CALCIUM SPEC-SCNC: 9.8 MG/DL (ref 8.6–10.5)
CHLORIDE SERPL-SCNC: 101 MMOL/L (ref 98–107)
CK SERPL-CCNC: 118 U/L (ref 20–180)
CO2 SERPL-SCNC: 27.2 MMOL/L (ref 22–29)
CREAT SERPL-MCNC: 1.03 MG/DL (ref 0.57–1)
DEPRECATED RDW RBC AUTO: 43.8 FL (ref 37–54)
EOSINOPHIL # BLD AUTO: 0.28 10*3/MM3 (ref 0–0.4)
EOSINOPHIL NFR BLD AUTO: 5.4 % (ref 0.3–6.2)
ERYTHROCYTE [DISTWIDTH] IN BLOOD BY AUTOMATED COUNT: 13.2 % (ref 12.3–15.4)
FOLATE SERPL-MCNC: >20 NG/ML (ref 4.78–24.2)
GFR SERPL CREATININE-BSD FRML MDRD: 55 ML/MIN/1.73
GLOBULIN UR ELPH-MCNC: 2.6 GM/DL
GLUCOSE SERPL-MCNC: 75 MG/DL (ref 65–99)
HCT VFR BLD AUTO: 39.8 % (ref 34–46.6)
HGB BLD-MCNC: 12.5 G/DL (ref 12–15.9)
IMM GRANULOCYTES # BLD AUTO: 0.01 10*3/MM3 (ref 0–0.05)
IMM GRANULOCYTES NFR BLD AUTO: 0.2 % (ref 0–0.5)
IRON 24H UR-MRATE: 75 MCG/DL (ref 37–145)
IRON SATN MFR SERPL: 16 % (ref 20–50)
LYMPHOCYTES # BLD AUTO: 1.28 10*3/MM3 (ref 0.7–3.1)
LYMPHOCYTES NFR BLD AUTO: 24.6 % (ref 19.6–45.3)
MCH RBC QN AUTO: 28.4 PG (ref 26.6–33)
MCHC RBC AUTO-ENTMCNC: 31.4 G/DL (ref 31.5–35.7)
MCV RBC AUTO: 90.5 FL (ref 79–97)
MONOCYTES # BLD AUTO: 0.34 10*3/MM3 (ref 0.1–0.9)
MONOCYTES NFR BLD AUTO: 6.5 % (ref 5–12)
NEUTROPHILS NFR BLD AUTO: 3.27 10*3/MM3 (ref 1.7–7)
NEUTROPHILS NFR BLD AUTO: 62.9 % (ref 42.7–76)
NRBC BLD AUTO-RTO: 0 /100 WBC (ref 0–0.2)
PLATELET # BLD AUTO: 360 10*3/MM3 (ref 140–450)
PMV BLD AUTO: 10.8 FL (ref 6–12)
POTASSIUM SERPL-SCNC: 4 MMOL/L (ref 3.5–5.2)
PROT SERPL-MCNC: 7.2 G/DL (ref 6–8.5)
RBC # BLD AUTO: 4.4 10*6/MM3 (ref 3.77–5.28)
SODIUM SERPL-SCNC: 139 MMOL/L (ref 136–145)
T4 FREE SERPL-MCNC: 1.39 NG/DL (ref 0.93–1.7)
TIBC SERPL-MCNC: 456 MCG/DL (ref 298–536)
TRANSFERRIN SERPL-MCNC: 306 MG/DL (ref 200–360)
TSH SERPL DL<=0.05 MIU/L-ACNC: 1.99 UIU/ML (ref 0.27–4.2)
VIT B12 BLD-MCNC: 994 PG/ML (ref 211–946)
WBC # BLD AUTO: 5.2 10*3/MM3 (ref 3.4–10.8)

## 2021-10-21 PROCEDURE — 82550 ASSAY OF CK (CPK): CPT | Performed by: FAMILY MEDICINE

## 2021-10-21 PROCEDURE — 82607 VITAMIN B-12: CPT | Performed by: FAMILY MEDICINE

## 2021-10-21 PROCEDURE — 83540 ASSAY OF IRON: CPT | Performed by: FAMILY MEDICINE

## 2021-10-21 PROCEDURE — 82306 VITAMIN D 25 HYDROXY: CPT | Performed by: FAMILY MEDICINE

## 2021-10-21 PROCEDURE — 36415 COLL VENOUS BLD VENIPUNCTURE: CPT | Performed by: FAMILY MEDICINE

## 2021-10-21 PROCEDURE — 80053 COMPREHEN METABOLIC PANEL: CPT | Performed by: FAMILY MEDICINE

## 2021-10-21 PROCEDURE — 82746 ASSAY OF FOLIC ACID SERUM: CPT | Performed by: FAMILY MEDICINE

## 2021-10-21 PROCEDURE — 85025 COMPLETE CBC W/AUTO DIFF WBC: CPT | Performed by: FAMILY MEDICINE

## 2021-10-21 PROCEDURE — 84443 ASSAY THYROID STIM HORMONE: CPT | Performed by: FAMILY MEDICINE

## 2021-10-21 PROCEDURE — 84439 ASSAY OF FREE THYROXINE: CPT | Performed by: FAMILY MEDICINE

## 2021-10-21 PROCEDURE — 84466 ASSAY OF TRANSFERRIN: CPT | Performed by: FAMILY MEDICINE

## 2021-10-21 NOTE — PROGRESS NOTES
Venipuncture Blood Specimen Collection  Venipuncture performed in right arm  by Shelli Arvizu with good hemostasis. Patient tolerated the procedure well without complications.   10/21/21   Shelli Arvizu

## 2021-11-10 ENCOUNTER — HOSPITAL ENCOUNTER (OUTPATIENT)
Dept: ULTRASOUND IMAGING | Facility: HOSPITAL | Age: 56
Discharge: HOME | End: 2021-11-10
Admitting: OTOLARYNGOLOGY

## 2021-11-10 ENCOUNTER — CLINICAL SUPPORT (OUTPATIENT)
Dept: FAMILY MEDICINE CLINIC | Facility: CLINIC | Age: 56
End: 2021-11-10

## 2021-11-10 DIAGNOSIS — E04.1 THYROID NODULE: ICD-10-CM

## 2021-11-10 DIAGNOSIS — Z23 NEED FOR INFLUENZA VACCINATION: Primary | ICD-10-CM

## 2021-11-10 PROCEDURE — 90471 IMMUNIZATION ADMIN: CPT | Performed by: FAMILY MEDICINE

## 2021-11-10 PROCEDURE — 76536 US EXAM OF HEAD AND NECK: CPT

## 2021-11-10 PROCEDURE — 90686 IIV4 VACC NO PRSV 0.5 ML IM: CPT | Performed by: FAMILY MEDICINE

## 2021-11-11 ENCOUNTER — CLINICAL SUPPORT (OUTPATIENT)
Dept: FAMILY MEDICINE CLINIC | Facility: CLINIC | Age: 56
End: 2021-11-11

## 2021-11-11 DIAGNOSIS — J45.909 ASTHMA, UNSPECIFIED ASTHMA SEVERITY, UNSPECIFIED WHETHER COMPLICATED, UNSPECIFIED WHETHER PERSISTENT: ICD-10-CM

## 2021-11-11 DIAGNOSIS — Z79.899 DRUG THERAPY: Primary | ICD-10-CM

## 2021-11-11 LAB
ALBUMIN SERPL-MCNC: 4.5 G/DL (ref 3.5–5.2)
ALBUMIN/GLOB SERPL: 1.7 G/DL
ALP SERPL-CCNC: 204 U/L (ref 39–117)
ALT SERPL W P-5'-P-CCNC: 38 U/L (ref 1–33)
ANION GAP SERPL CALCULATED.3IONS-SCNC: 8.8 MMOL/L (ref 5–15)
AST SERPL-CCNC: 37 U/L (ref 1–32)
BILIRUB SERPL-MCNC: 0.2 MG/DL (ref 0–1.2)
BUN SERPL-MCNC: 17 MG/DL (ref 6–20)
BUN/CREAT SERPL: 18.9 (ref 7–25)
CALCIUM SPEC-SCNC: 9.5 MG/DL (ref 8.6–10.5)
CHLORIDE SERPL-SCNC: 100 MMOL/L (ref 98–107)
CO2 SERPL-SCNC: 28.2 MMOL/L (ref 22–29)
CREAT SERPL-MCNC: 0.9 MG/DL (ref 0.57–1)
GFR SERPL CREATININE-BSD FRML MDRD: 64 ML/MIN/1.73
GLOBULIN UR ELPH-MCNC: 2.7 GM/DL
GLUCOSE SERPL-MCNC: 85 MG/DL (ref 65–99)
POTASSIUM SERPL-SCNC: 4.1 MMOL/L (ref 3.5–5.2)
PROT SERPL-MCNC: 7.2 G/DL (ref 6–8.5)
SODIUM SERPL-SCNC: 137 MMOL/L (ref 136–145)

## 2021-11-11 PROCEDURE — 36415 COLL VENOUS BLD VENIPUNCTURE: CPT | Performed by: FAMILY MEDICINE

## 2021-11-11 PROCEDURE — 80053 COMPREHEN METABOLIC PANEL: CPT | Performed by: FAMILY MEDICINE

## 2021-11-13 NOTE — PROGRESS NOTES
Liver enzymes and alkaline phosphatase have really jumped.  Let your GI doctor know. Let me know if you need anything else.  Hope you and Elroy have a great weekend.

## 2021-11-15 DIAGNOSIS — R07.9 CHEST PAIN, UNSPECIFIED TYPE: Primary | ICD-10-CM

## 2021-11-15 DIAGNOSIS — R06.02 SHORTNESS OF BREATH: ICD-10-CM

## 2021-11-18 ENCOUNTER — OFFICE VISIT (OUTPATIENT)
Dept: OTOLARYNGOLOGY | Facility: CLINIC | Age: 56
End: 2021-11-18

## 2021-11-18 VITALS — WEIGHT: 189 LBS | HEIGHT: 62 IN | TEMPERATURE: 97.3 F | BODY MASS INDEX: 34.78 KG/M2

## 2021-11-18 DIAGNOSIS — E04.1 THYROID NODULE: Primary | ICD-10-CM

## 2021-11-18 DIAGNOSIS — D10.0 FIBROMA OF LIP: ICD-10-CM

## 2021-11-18 PROCEDURE — 99212 OFFICE O/P EST SF 10 MIN: CPT | Performed by: OTOLARYNGOLOGY

## 2021-11-18 NOTE — PROGRESS NOTES
Patient Name: Josette Carcamo   Visit Date: 11/18/2021   Patient ID: [id number]  Provider: Jayden Doherty MD    Sex: female  Location: [facility]   YOB: 1965  Location Address: [address] [place] [address], [address],?[place]?[id number]    Primary Care Provider Nicholas Doherty MD  Location Phone: [phone]    Referring Provider: No ref. provider found        Chief Complaint  Thyroid Problem and Results    History of Present Illness  Josette Carcamo is a 56 y.o. female with past medical history significant for primary biliary cirrhosis, celiac disease, chronic Karen-Barr viral infection, allergic rhinitis, and asthma who returns today for follow-up of thyroid nodules and hoarseness.  She was originally seen on 6/7/18 after 8-9 years of daily hoarseness.  Examination that day revealed evidence consistent with reflux versus allergies.  She was then seen on 10/4/2018 thyroid nodules that she had known about for years having previously undergone multiple ultrasounds and even an ultrasound-guided FNA in the past.  Her only previous FNA on record occurred on 8/29/05 and revealed the right nodule to be consistent with a cyst.  Thyroid ultrasound occurred on 9/24/18 and revealed a right 0.9 cm cyst, several tiny nodules measuring up to 0.5 cm, and a solid and cystic nodule measuring 0.8 cm.  She denies any family history of thyroid cancer or personal history of excessive radiation exposure.  She also discussed issues with dysphasia she had a significant work-up in the past without definitive diagnosis.  At that time we elected to continue observation of the thyroid. Repeat thyroid ultrasound on 10/29/2019 revealed a right solid and cystic 0.9 cm nodule which was previously 0.8 cm, a right 0.9 cm solid and cystic previously measuring 0.9 cm, a right 0.6 cm hypoechoic nodule previously measuring 0.5 cm, a right 0.5 cm hypoechoic nodule which was not previously measured. Thyroid  ultrasound on 11/2/2020 reveals multiple stable right-sided cystic and solid and cystic nodules.    She returns today for follow-up having last been seen on 11/12/2020.    She tells me that she Covid back in January and has had significant issues with headaches since.  She is otherwise doing fairly well.  She has not had any more issue with dysphagia than usual.  She is not having any issues with neck pressure or shortness of breath.  Thyroid ultrasound on 11/10/2021 revealed a right 0.8 x 0.9 x 1.2 cm solid and cystic nodule, right 1 cm cystic nodule, right 0.6 cm hypoechoic nodule, and right 0.4 cm cystic nodule.  There are a few tiny left-sided nodules less than 0.5 cm noted.  Thyroid function testing on 10/21/2021 revealed a normal TSH of 1.990 and normal free T4 of 1.39.      Past Medical History:   Diagnosis Date   • Allergic rhinitis    • Anaphylaxis     Food allergies    • Asthma    • Carpal tunnel syndrome of right wrist 01/31/2019   • Cervical radiculopathy 01/03/2019   • Cervicalgia 01/03/2019   • Dysphagia    • Leg swelling    • Liver lesion    • Muscle cramps    • Pancreatic lesion    • Presbylarynx    • Primary biliary cirrhosis (HCC)    • Thyroid nodule    • Ulnar neuropathy at elbow 02/21/2019    Symptoms into ulnar distribution    • Voice hoarseness        Past Surgical History:   Procedure Laterality Date   • BLADDER SUSPENSION      Age 32   • CARPAL TUNNEL RELEASE  02/08/2019    Right CTR & right ulnar nerve decompression   • CHOLECYSTECTOMY     • DILATATION AND CURETTAGE      Multiple    • HYSTERECTOMY      Partial   • TONSILLECTOMY      Age 14   • TUBAL ABDOMINAL LIGATION      Age 27         Current Outpatient Medications:   •  albuterol sulfate HFA (Ventolin HFA) 108 (90 Base) MCG/ACT inhaler, Inhale 1-2 puffs As Needed., Disp: , Rfl:   •  cholecalciferol (Cholecalciferol) 25 MCG (1000 UT) tablet, Take 1 tablet by mouth Daily., Disp: , Rfl:   •  EPINEPHrine (EpiPen 2-Jordi) 0.3 MG/0.3ML solution  "auto-injector injection, Inject 0.3 mL under the skin into the appropriate area as directed As Needed., Disp: , Rfl:   •  famotidine (Pepcid) 40 MG tablet, Take 1 tablet by mouth At Night As Needed for Heartburn., Disp: 90 tablet, Rfl: 3  •  folic acid (FOLVITE) 1 MG tablet, Take 1 tablet by mouth Daily., Disp: 90 tablet, Rfl: 3  •  hydroCHLOROthiazide (HYDRODIURIL) 12.5 MG tablet, Take 1 tablet by mouth Daily., Disp: 90 tablet, Rfl: 3  •  Lactobacillus (Probiotic Acidophilus) tablet, Take 1 tablet by mouth Daily., Disp: , Rfl:   •  linaclotide (Linzess) 290 MCG capsule capsule, Take 1 capsule by mouth Every Morning Before Breakfast., Disp: 90 capsule, Rfl: 3  •  ursodiol (Aniceto Forte) 500 MG tablet, Take 1 tablet by mouth 3 (Three) Times a Day., Disp: 270 tablet, Rfl: 0  •  vitamin E 400 UNIT capsule, Take 1 capsule by mouth Daily., Disp: , Rfl:   •  Obeticholic Acid (Ocaliva) 5 MG tablet, Take 1 tablet by mouth Daily., Disp: , Rfl:      Allergies   Allergen Reactions   • Cephalexin Hives   • Clindamycin Hcl Hives   • Erythromycin Hives   • Gluten Meal Hives   • Latex Shortness Of Breath and Swelling   • Penicillins Hives       Social History     Tobacco Use   • Smoking status: Never Smoker   • Smokeless tobacco: Never Used   Vaping Use   • Vaping Use: Never used   Substance Use Topics   • Alcohol use: Never   • Drug use: Not on file        Objective     Vital Signs:   Temp 97.3 °F (36.3 °C) (Temporal)   Ht 157.5 cm (62\")   Wt 85.7 kg (189 lb)   BMI 34.57 kg/m²       Physical Exam    General: Well developed, well nourished patient of stated age in no acute distress. Voice is strong and clear.   Head: Normocephalic and atraumatic.  Face: No lesions.  Bilateral parotid and submandibular glands are unremarkable.  Stensen's and Warthin's ducts are productive of clear saliva bilaterally.  House-Brackmann I/VI     bilaterally.   muscles and temporomandibular joint nontender to palpation.  No TMJ " crepitus.  Eyes: PERRLA, sclerae anicteric, no conjunctival injection. Extra ocular movements are intact and full. No nystagmus.   Ears: Auricles are normal in appearance. Bilateral external auditory canals are unremarkable. Bilateral tympanic membranes are clear and without effusion. Hearing normal to conversational voice.   Nose: External nose is normal in appearance. Bilateral nares are patent with normal appearing mucosa. Septum midline. Turbinates are unremarkable. No lesions.   Oral Cavity: Lips are normal in appearance aside from a left lower lip lateral fibroma measuring approximately 3 mm. Oral mucosa is unremarkable. Gingiva is unremarkable. Normal dentition for age. Tongue is unremarkable with good movement. Hard palate is unremarkable.   Oropharynx: Soft palate is unremarkable with full movement. Uvula is unremarkable. Bilateral tonsils are surgically absent. Posterior oropharynx is unremarkable.    Larynx and hypopharynx: Deferred secondary to gag reflex.  Neck: Supple.  No mass.  Nontender to palpation.  Trachea midline. Thyroid normal size and without nodules to palpation.   Lymphatic: No lymphadenopathy upon palpation.   Psychiatric: Appropriate affect, cooperative   Neurologic: Oriented x 3, strength symmetric in all extremities, Cranial Nerves II-XII are grossly intact to confrontation   Skin: Warm and dry. No rashes.    Procedures           Result Review :               Assessment and Plan    Diagnoses and all orders for this visit:    1. Thyroid nodule (Primary)  -     US Thyroid; Future    2. Fibroma of lip    Impressions and findings were discussed.  We reviewed and discussed the images from her 11/10/21 thyroid ultrasound which revealed stable appearing nodules bilaterally.  She does have a small left lower lip fibroma on examination today.  We discussed the pathophysiology and natural history of those lesions.  She will continue observation and call if she would like to arrange excision in  clinic.  In regards to her thyroid nodule she will follow up in 1 year with an ultrasound or sooner if needed.        Follow Up   Return in about 1 year (around 11/18/2022).  Patient was given instructions and counseling regarding her condition or for health maintenance advice. Please see specific information pulled into the AVS if appropriate.

## 2021-11-19 ENCOUNTER — OFFICE VISIT (OUTPATIENT)
Dept: CARDIOLOGY | Facility: CLINIC | Age: 56
End: 2021-11-19

## 2021-11-19 VITALS
DIASTOLIC BLOOD PRESSURE: 82 MMHG | HEART RATE: 80 BPM | WEIGHT: 191 LBS | BODY MASS INDEX: 35.15 KG/M2 | HEIGHT: 62 IN | SYSTOLIC BLOOD PRESSURE: 136 MMHG

## 2021-11-19 DIAGNOSIS — R07.89 ATYPICAL CHEST PAIN: Primary | ICD-10-CM

## 2021-11-19 DIAGNOSIS — R60.0 BILATERAL LOWER EXTREMITY EDEMA: ICD-10-CM

## 2021-11-19 DIAGNOSIS — R06.09 DOE (DYSPNEA ON EXERTION): ICD-10-CM

## 2021-11-19 DIAGNOSIS — R00.2 PALPITATIONS: ICD-10-CM

## 2021-11-19 PROCEDURE — 93000 ELECTROCARDIOGRAM COMPLETE: CPT | Performed by: SPECIALIST

## 2021-11-19 PROCEDURE — 99214 OFFICE O/P EST MOD 30 MIN: CPT | Performed by: NURSE PRACTITIONER

## 2021-11-19 RX ORDER — VALACYCLOVIR HYDROCHLORIDE 500 MG/1
500 TABLET, FILM COATED ORAL 2 TIMES DAILY
COMMUNITY
End: 2022-05-24 | Stop reason: SDUPTHER

## 2021-11-19 NOTE — PROGRESS NOTES
Chief Complaint  Shortness of Breath (gradual over the last 6 wks) and Chest Pain (2wks more when laying down, left sided )    Subjective            History of Present Illness  Josette Carcamo is a 56-year-old white/ female patient who presents to the office today with complaints of gradually worsening shortness of breath with exertion over the past month and a half and also nonexertional recurrent left-sided chest pain for the past 2 weeks.  She reports that the chest pain has been occurring at nighttime when she lays down flat with associated palpitations as well.  She has been propping herself up on multiple pillows with some but not complete resolution of discomfort.  She also reports that she has noticed fluid retention in her lower extremities over the past few weeks as well, this is a new symptom for her.  She has primary biliary cirrhosis and has been having complications with medications for this condition post Covid which she had in January of this year.    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    Symptoms into ulnar distribution    • Voice hoarseness          ALLERGY  Allergies   Allergen Reactions   • Cephalexin Hives   • Clindamycin Hcl Hives   • Erythromycin Hives   • Gluten Meal Hives   • Latex Shortness Of Breath and Swelling   • Peanut-Containing Drug Products Hives   • Penicillins Hives          SURGICALHX  Past Surgical History:   Procedure Laterality Date   • BLADDER SUSPENSION      Age 32   • CARPAL TUNNEL RELEASE  02/08/2019    Right CTR & right ulnar nerve  decompression   • CHOLECYSTECTOMY     • DILATATION AND CURETTAGE      Multiple    • HYSTERECTOMY      Partial   • TONSILLECTOMY      Age 14   • TUBAL ABDOMINAL LIGATION      Age 27          SOC  Social History     Socioeconomic History   • Marital status:    Tobacco Use   • Smoking status: Never Smoker   • Smokeless tobacco: Never Used   Vaping Use   • Vaping Use: Never used   Substance and Sexual Activity   • Alcohol use: Never         FAMHX  Family History   Problem Relation Age of Onset   • Cervical cancer Mother    • Lung cancer Mother    • Arthritis Mother    • Heart disease Mother    • Diabetes Mother    • Osteoporosis Mother    • Lung cancer Father    • Other Father         Bladder cancer    • Arthritis Father    • Stroke Father    • Heart disease Father    • Diabetes Father    • Cervical cancer Sister    • Breast cancer Sister    • Arthritis Sister    • Stroke Sister    • Breast cancer Maternal Grandmother    • Colon cancer Other    • Melanoma Other    • Breast cancer Other           MEDSIGONLY  Current Outpatient Medications on File Prior to Visit   Medication Sig   • albuterol sulfate HFA (Ventolin HFA) 108 (90 Base) MCG/ACT inhaler Inhale 1-2 puffs As Needed.   • cholecalciferol (Cholecalciferol) 25 MCG (1000 UT) tablet Take 1 tablet by mouth Daily.   • EPINEPHrine (EpiPen 2-Jordi) 0.3 MG/0.3ML solution auto-injector injection Inject 0.3 mL under the skin into the appropriate area as directed As Needed.   • famotidine (Pepcid) 40 MG tablet Take 1 tablet by mouth At Night As Needed for Heartburn.   • folic acid (FOLVITE) 1 MG tablet Take 1 tablet by mouth Daily.   • hydroCHLOROthiazide (HYDRODIURIL) 12.5 MG tablet Take 1 tablet by mouth Daily.   • Lactobacillus (Probiotic Acidophilus) tablet Take 1 tablet by mouth Daily.   • linaclotide (Linzess) 290 MCG capsule capsule Take 1 capsule by mouth Every Morning Before Breakfast.   • Obeticholic Acid (Ocaliva) 5 MG tablet Take 1 tablet by mouth Daily.   •  "ursodiol (Aniceto Forte) 500 MG tablet Take 1 tablet by mouth 3 (Three) Times a Day.   • valACYclovir (VALTREX) 500 MG tablet Take 500 mg by mouth 2 (Two) Times a Day.   • vitamin E 400 UNIT capsule Take 1 capsule by mouth Daily.     No current facility-administered medications on file prior to visit.         Objective   /82   Pulse 80   Ht 157.5 cm (62\")   Wt 86.6 kg (191 lb)   BMI 34.93 kg/m²       Physical Exam  HENT:      Head: Normocephalic.   Neck:      Vascular: No carotid bruit.   Cardiovascular:      Rate and Rhythm: Normal rate and regular rhythm.      Pulses: Normal pulses.      Heart sounds: Normal heart sounds. No murmur heard.      Pulmonary:      Effort: Pulmonary effort is normal.      Breath sounds: Normal breath sounds.   Musculoskeletal:      Cervical back: Neck supple.      Right lower le+ Edema present.      Left lower le+ Edema present.   Skin:     General: Skin is dry.      Capillary Refill: Capillary refill takes less than 2 seconds.   Neurological:      Mental Status: She is alert and oriented to person, place, and time.   Psychiatric:         Behavior: Behavior normal.       ECG 12 Lead    Date/Time: 2021 10:03 AM  Performed by: Alissa Weeks APRN  Authorized by: Luis Figueroa MD   Comparison: compared with previous ECG   Similar to previous ECG  Rhythm: sinus rhythm  Rate: normal  BPM: 74  Conduction: conduction normal  ST Segments: ST segments normal  T Waves: T waves normal  QRS axis: normal  Other findings: early repolarization    Clinical impression: normal ECG          Result Review :   The following data was reviewed by: VERONICA Redd on 2021:  No results found for: PROBNP  CMP    CMP 21   Glucose 85   BUN 17   Creatinine 0.90   eGFR Non African Am 64   Sodium 137   Potassium 4.1   Chloride 100   Calcium 9.5   Albumin 4.50   Total Bilirubin 0.2   Alkaline Phosphatase 204 (A)   AST (SGOT) 37 (A)   ALT (SGPT) 38 (A)   (A) Abnormal " value            CBC w/diff    CBC w/Diff 10/21/21   WBC 5.20   RBC 4.40   Hemoglobin 12.5   Hematocrit 39.8   MCV 90.5   MCH 28.4   MCHC 31.4 (A)   RDW 13.2   Platelets 360   Neutrophil Rel % 62.9   Immature Granulocyte Rel % 0.2   Lymphocyte Rel % 24.6   Monocyte Rel % 6.5   Eosinophil Rel % 5.4   Basophil Rel % 0.4   (A) Abnormal value             Lab Results   Component Value Date    TSH 1.990 10/21/2021      Lab Results   Component Value Date    FREET4 1.39 10/21/2021      No results found for: DDIMERQUANT  Magnesium   Date Value Ref Range Status   01/09/2021 2.12 1.60 - 2.30 mg/dL Final      No results found for: DIGOXIN   No results found for: TROPONINT        Lipid Panel    Lipid Panel 7/29/21   Total Cholesterol 209 (A)   Triglycerides 52   HDL Cholesterol 66 (A)   VLDL Cholesterol 9   LDL Cholesterol  134 (A)   LDL/HDL Ratio 2.01   (A) Abnormal value            03/29/18 Stress Test:   1. Adequate exercise tolerance  2.Exercise EKG negative for ischemia  3.No major arrhythmias seen.         Assessment and Plan    Diagnoses and all orders for this visit:    1. Atypical chest pain (Primary)  Obtain treadmill stress test to evaluate for ischemia due to patient's recurrent left-sided chest pain and symptom of dyspnea on exertion.  -     Treadmill Stress Test; Future    2. CARIAS (dyspnea on exertion)  Obtain proBNP level to measure amount of fluid retention related to possible CHF.  -     proBNP; Future    3. Palpitations  Obtain 24-hour Holter monitor to evaluate for any bradycardia or tachyarrhythmias.  Check magnesium level to evaluate for hypo or hypermagnesemia.  -     Holter Monitor - 24 Hour; Future  -     Magnesium; Future    4. Bilateral lower extremity edema  Primary care has already ordered echocardiogram to evaluate left ventricular systolic function.    Other orders  -     ECG 12 Lead    Follow Up   Return in about 6 months (around 5/19/2022) for Follow up with Dr Zaragoza.    Patient was given  instructions and counseling regarding her condition or for health maintenance advice. Please see specific information pulled into the AVS if appropriate.     Josette Carcamo  reports that she has never smoked. She has never used smokeless tobacco.           Alissa Weeks, APRN  11/19/21  08:47 EST    Dictated Utilizing Dragon Dictation

## 2021-11-22 ENCOUNTER — CLINICAL SUPPORT (OUTPATIENT)
Dept: FAMILY MEDICINE CLINIC | Facility: CLINIC | Age: 56
End: 2021-11-22

## 2021-11-22 DIAGNOSIS — Z79.899 DRUG THERAPY: Primary | ICD-10-CM

## 2021-11-22 DIAGNOSIS — Z79.899 MEDICATION MANAGEMENT: ICD-10-CM

## 2021-11-22 DIAGNOSIS — R60.0 BILATERAL LOWER EXTREMITY EDEMA: ICD-10-CM

## 2021-11-22 DIAGNOSIS — R00.2 PALPITATIONS: ICD-10-CM

## 2021-11-22 DIAGNOSIS — R06.09 DOE (DYSPNEA ON EXERTION): ICD-10-CM

## 2021-11-22 LAB
ALBUMIN SERPL-MCNC: 4.4 G/DL (ref 3.5–5.2)
ALBUMIN/GLOB SERPL: 1.6 G/DL
ALP SERPL-CCNC: 206 U/L (ref 39–117)
ALT SERPL W P-5'-P-CCNC: 36 U/L (ref 1–33)
ANION GAP SERPL CALCULATED.3IONS-SCNC: 10.7 MMOL/L (ref 5–15)
AST SERPL-CCNC: 37 U/L (ref 1–32)
BILIRUB SERPL-MCNC: 0.3 MG/DL (ref 0–1.2)
BUN SERPL-MCNC: 19 MG/DL (ref 6–20)
BUN/CREAT SERPL: 18.4 (ref 7–25)
CALCIUM SPEC-SCNC: 9.4 MG/DL (ref 8.6–10.5)
CHLORIDE SERPL-SCNC: 101 MMOL/L (ref 98–107)
CO2 SERPL-SCNC: 27.3 MMOL/L (ref 22–29)
CREAT SERPL-MCNC: 1.03 MG/DL (ref 0.57–1)
GFR SERPL CREATININE-BSD FRML MDRD: 55 ML/MIN/1.73
GLOBULIN UR ELPH-MCNC: 2.7 GM/DL
GLUCOSE SERPL-MCNC: 89 MG/DL (ref 65–99)
MAGNESIUM SERPL-MCNC: 2.2 MG/DL (ref 1.6–2.6)
NT-PROBNP SERPL-MCNC: 28.2 PG/ML (ref 0–900)
POTASSIUM SERPL-SCNC: 3.9 MMOL/L (ref 3.5–5.2)
PROT SERPL-MCNC: 7.1 G/DL (ref 6–8.5)
SODIUM SERPL-SCNC: 139 MMOL/L (ref 136–145)

## 2021-11-22 PROCEDURE — 83735 ASSAY OF MAGNESIUM: CPT | Performed by: FAMILY MEDICINE

## 2021-11-22 PROCEDURE — 83880 ASSAY OF NATRIURETIC PEPTIDE: CPT | Performed by: FAMILY MEDICINE

## 2021-11-22 PROCEDURE — 80053 COMPREHEN METABOLIC PANEL: CPT | Performed by: FAMILY MEDICINE

## 2021-11-22 PROCEDURE — 36415 COLL VENOUS BLD VENIPUNCTURE: CPT | Performed by: FAMILY MEDICINE

## 2021-11-22 NOTE — PROGRESS NOTES
Venipuncture Blood Specimen Collection  Venipuncture performed in left arm  by Shelli Arvizu with good hemostasis. Patient tolerated the procedure well without complications.   11/22/21   Shelli Arvizu

## 2021-11-23 ENCOUNTER — PATIENT MESSAGE (OUTPATIENT)
Dept: CARDIOLOGY | Facility: CLINIC | Age: 56
End: 2021-11-23

## 2021-11-24 ENCOUNTER — PATIENT MESSAGE (OUTPATIENT)
Dept: CARDIOLOGY | Facility: CLINIC | Age: 56
End: 2021-11-24

## 2021-12-01 ENCOUNTER — APPOINTMENT (OUTPATIENT)
Dept: CARDIOLOGY | Facility: HOSPITAL | Age: 56
End: 2021-12-01

## 2021-12-02 DIAGNOSIS — Z12.31 SCREENING MAMMOGRAM FOR BREAST CANCER: Primary | ICD-10-CM

## 2021-12-03 ENCOUNTER — OFFICE VISIT (OUTPATIENT)
Dept: FAMILY MEDICINE CLINIC | Facility: CLINIC | Age: 56
End: 2021-12-03

## 2021-12-03 VITALS — HEART RATE: 73 BPM | TEMPERATURE: 97.8 F | OXYGEN SATURATION: 98 %

## 2021-12-03 DIAGNOSIS — J45.31 MILD PERSISTENT ASTHMA WITH EXACERBATION: Primary | ICD-10-CM

## 2021-12-03 PROCEDURE — 99213 OFFICE O/P EST LOW 20 MIN: CPT | Performed by: NURSE PRACTITIONER

## 2021-12-03 RX ORDER — ALBUTEROL SULFATE 1.25 MG/3ML
1 SOLUTION RESPIRATORY (INHALATION) EVERY 6 HOURS PRN
Qty: 90 ML | Refills: 2 | Status: SHIPPED | OUTPATIENT
Start: 2021-12-03

## 2021-12-03 RX ORDER — METHYLPREDNISOLONE 4 MG/1
TABLET ORAL
Qty: 21 TABLET | Refills: 0 | Status: SHIPPED | OUTPATIENT
Start: 2021-12-03 | End: 2022-04-05

## 2021-12-03 RX ORDER — ALBUTEROL SULFATE 90 UG/1
1-2 AEROSOL, METERED RESPIRATORY (INHALATION) AS NEEDED
Qty: 18 G | Refills: 0 | Status: SHIPPED | OUTPATIENT
Start: 2021-12-03 | End: 2022-10-25 | Stop reason: SDUPTHER

## 2021-12-03 NOTE — PROGRESS NOTES
Chief Complaint  Cough (started last night), Shortness of Breath, and Wheezing    Subjective          Josette Tanner Carcamo presents to [facility]  Patient presents with report of acute onset of asthma symptoms.    She reports symptoms started at 2 AM this morning.  She woke abruptly with her chest feeling extremely tight, very short of air, and wheezing.  She used her albuterol inhaler 2 times with minimal success.  She had 120 mg dose of prednisone left from a previous prescription.  She took that medication in addition to Benadryl.  After about 2 hours symptoms began to resolve.    She reports that she has had intermittent and more persistent asthma flareup since her diagnosis of Covid.  Previously her asthma was fairly well controlled.    At this time she denies fever, sore throat, ear pain, headache, or change in taste.  Denies body aches.  No abdominal pain, nausea vomiting or diarrhea.  She has received her Covid vaccine.    She feels like she would benefit from nebulizer use at home.              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    Symptoms into ulnar distribution    • Voice hoarseness        Allergies   Allergen Reactions   • Cephalexin Hives   • Clindamycin Hcl Hives   • Erythromycin Hives   • Gluten Meal Hives   • Latex Shortness Of Breath and Swelling   • Peanut-Containing Drug Products Hives   • Penicillins Hives        Past Surgical History:   Procedure Laterality Date   • BLADDER SUSPENSION      Age 32   • CARPAL TUNNEL RELEASE  02/08/2019     Right CTR & right ulnar nerve decompression   • CHOLECYSTECTOMY     • DILATATION AND CURETTAGE      Multiple    • HYSTERECTOMY      Partial   • TONSILLECTOMY      Age 14   • TUBAL ABDOMINAL LIGATION      Age 27        Social History     Socioeconomic History   • Marital status:    Tobacco Use   • Smoking status: Never Smoker   • Smokeless tobacco: Never Used   Vaping Use   • Vaping Use: Never used   Substance and Sexual Activity   • Alcohol use: Never       Family History   Problem Relation Age of Onset   • Cervical cancer Mother    • Lung cancer Mother    • Arthritis Mother    • Heart disease Mother    • Diabetes Mother    • Osteoporosis Mother    • Lung cancer Father    • Other Father         Bladder cancer    • Arthritis Father    • Stroke Father    • Heart disease Father    • Diabetes Father    • Cervical cancer Sister    • Breast cancer Sister    • Arthritis Sister    • Stroke Sister    • Breast cancer Maternal Grandmother    • Colon cancer Other    • Melanoma Other    • Breast cancer Other         Health Maintenance Due   Topic Date Due   • Pneumococcal Vaccine 0-64 (1 of 2 - PPSV23) Never done   • Hepatitis B (1 of 3 - Risk 3-dose series) Never done   • ZOSTER VACCINE (1 of 2) Never done   • HEPATITIS C SCREENING  Never done   • COVID-19 Vaccine (2 - Moderna 3-dose booster series) 10/08/2021        Last Completed Pap Smear     This patient has no relevant Health Maintenance data.          Last Completed Mammogram          Scheduled - MAMMOGRAM (Every 2 Years) Scheduled for 12/29/2021 12/18/2020  Mammo Screening Digital Tomosynthesis Bilateral With CAD    12/18/2020  SCANNED - MAMMO    12/16/2019  Mammo Screening Digital Tomosynthesis Bilateral With CAD    10/29/2019  Outside Procedure: HC MAMMOGRAM SCREENING BILAT DIGITAL W CAD    11/08/2018  Mammo Screening Digital Tomosynthesis Bilateral With CAD    Only the first 5 history entries have been loaded, but more history exists.                Last  Completed Colonoscopy          COLORECTAL CANCER SCREENING (COLONOSCOPY - Every 10 Years) Next due on 6/25/2029 06/25/2019  SCANNED - COLONOSCOPY    01/22/2010  SCANNED - COLONOSCOPY                Current Outpatient Medications on File Prior to Visit   Medication Sig   • albuterol sulfate HFA (Ventolin HFA) 108 (90 Base) MCG/ACT inhaler Inhale 1-2 puffs As Needed.   • cholecalciferol (Cholecalciferol) 25 MCG (1000 UT) tablet Take 1 tablet by mouth Daily.   • EPINEPHrine (EpiPen 2-Jordi) 0.3 MG/0.3ML solution auto-injector injection Inject 0.3 mL under the skin into the appropriate area as directed As Needed.   • famotidine (Pepcid) 40 MG tablet Take 1 tablet by mouth At Night As Needed for Heartburn.   • folic acid (FOLVITE) 1 MG tablet Take 1 tablet by mouth Daily.   • hydroCHLOROthiazide (HYDRODIURIL) 12.5 MG tablet Take 1 tablet by mouth Daily.   • Lactobacillus (Probiotic Acidophilus) tablet Take 1 tablet by mouth Daily.   • linaclotide (Linzess) 290 MCG capsule capsule Take 1 capsule by mouth Every Morning Before Breakfast.   • Obeticholic Acid (Ocaliva) 5 MG tablet Take 1 tablet by mouth Daily.   • spironolactone (ALDACTONE) 50 MG tablet Take 1 tablet (50 mg total) by mouth 1 (one) time each day.   • ursodiol (ACTIGALL) 500 MG tablet Take 1 tablet (500 mg total) by mouth 3 (three) times a day.   • valACYclovir (VALTREX) 500 MG tablet Take 500 mg by mouth 2 (Two) Times a Day.   • vitamin E 400 UNIT capsule Take 1 capsule by mouth Daily.     No current facility-administered medications on file prior to visit.       Immunization History   Administered Date(s) Administered   • COVID-19 (MODERNA) 1st, 2nd, 3rd Dose Only 09/10/2021   • FluLaval/Fluarix/Fluzone >6 11/10/2021   • Hepatitis A 07/23/2018   • Influenza, Unspecified 10/19/2020   • Tdap 01/26/2018       Review of Systems     Objective     Pulse 73   Temp 97.8 °F (36.6 °C)   SpO2 98%         Physical Exam  Vitals and nursing note reviewed.    Constitutional:       Appearance: Normal appearance.   HENT:      Head: Normocephalic.   Cardiovascular:      Rate and Rhythm: Normal rate and regular rhythm.      Heart sounds: Normal heart sounds.   Pulmonary:      Effort: Pulmonary effort is normal.      Breath sounds: Normal breath sounds. No wheezing.   Abdominal:      Palpations: Abdomen is soft.   Skin:     General: Skin is warm and dry.   Neurological:      Mental Status: She is alert.           Result Review :                           Assessment and Plan        Diagnoses and all orders for this visit:    1. Moderate persistent asthma with exacerbation (Primary)              Follow Up   Patient has been provided with nebulizer and albuterol for home use as needed.  Albuterol inhaler also refilled for as needed use.  Steroid provided.  No antibiotic indicated at this time.    Routine preventive inhaler not recommended at this time but if asthma continues to be moderately persistent will need to consider preventive medication.  No follow-ups on file.    Patient was given instructions and counseling regarding her condition or for health maintenance advice. Please see specific information pulled into the AVS if appropriate.

## 2021-12-20 ENCOUNTER — CLINICAL SUPPORT (OUTPATIENT)
Dept: FAMILY MEDICINE CLINIC | Facility: CLINIC | Age: 56
End: 2021-12-20

## 2021-12-20 DIAGNOSIS — B25.9: ICD-10-CM

## 2021-12-20 DIAGNOSIS — Z79.899 DRUG THERAPY: Primary | ICD-10-CM

## 2021-12-20 PROCEDURE — 36415 COLL VENOUS BLD VENIPUNCTURE: CPT | Performed by: FAMILY MEDICINE

## 2021-12-20 PROCEDURE — 80053 COMPREHEN METABOLIC PANEL: CPT | Performed by: FAMILY MEDICINE

## 2021-12-20 NOTE — PROGRESS NOTES
..  Venipuncture Blood Specimen Collection  Venipuncture performed in left arm by Freda Weeks with good hemostasis. Patient tolerated the procedure well without complications.   12/20/21   Freda Weeks

## 2021-12-21 ENCOUNTER — TELEPHONE (OUTPATIENT)
Dept: FAMILY MEDICINE CLINIC | Facility: CLINIC | Age: 56
End: 2021-12-21

## 2021-12-21 LAB
ALBUMIN SERPL-MCNC: 4.5 G/DL (ref 3.5–5.2)
ALBUMIN/GLOB SERPL: 1.4 G/DL
ALP SERPL-CCNC: 205 U/L (ref 39–117)
ALT SERPL W P-5'-P-CCNC: 36 U/L (ref 1–33)
ANION GAP SERPL CALCULATED.3IONS-SCNC: 11 MMOL/L (ref 5–15)
AST SERPL-CCNC: 32 U/L (ref 1–32)
BILIRUB SERPL-MCNC: <0.2 MG/DL (ref 0–1.2)
BUN SERPL-MCNC: 16 MG/DL (ref 6–20)
BUN/CREAT SERPL: 13.8 (ref 7–25)
CALCIUM SPEC-SCNC: 9.9 MG/DL (ref 8.6–10.5)
CHLORIDE SERPL-SCNC: 100 MMOL/L (ref 98–107)
CO2 SERPL-SCNC: 27 MMOL/L (ref 22–29)
CREAT SERPL-MCNC: 1.16 MG/DL (ref 0.57–1)
GFR SERPL CREATININE-BSD FRML MDRD: 48 ML/MIN/1.73
GLOBULIN UR ELPH-MCNC: 3.3 GM/DL
GLUCOSE SERPL-MCNC: 87 MG/DL (ref 65–99)
POTASSIUM SERPL-SCNC: 4.7 MMOL/L (ref 3.5–5.2)
PROT SERPL-MCNC: 7.8 G/DL (ref 6–8.5)
SODIUM SERPL-SCNC: 138 MMOL/L (ref 136–145)

## 2021-12-21 NOTE — TELEPHONE ENCOUNTER
Caller: KENZIE    Relationship: Provider DR. GUPTA'S OFFICE  [facility]    Best call back number: [phone]    What is the best time to reach you: ANYTIME    Who are you requesting to speak with (clinical staff, provider,  specific staff member): CLINICAL      What was the call regarding: KENZIE IS NEEDING TO SPEAK WITH SOMEONE THAT PERFORMS ALLERGY INJECTIONS    Do you require a callback: YES

## 2021-12-29 ENCOUNTER — HOSPITAL ENCOUNTER (OUTPATIENT)
Dept: MAMMOGRAPHY | Facility: HOSPITAL | Age: 56
Discharge: HOME | End: 2021-12-29
Admitting: FAMILY MEDICINE

## 2021-12-29 DIAGNOSIS — Z12.31 SCREENING MAMMOGRAM FOR BREAST CANCER: ICD-10-CM

## 2021-12-29 PROCEDURE — 77067 SCR MAMMO BI INCL CAD: CPT

## 2021-12-29 PROCEDURE — 77063 BREAST TOMOSYNTHESIS BI: CPT

## 2022-01-25 DIAGNOSIS — Z79.899 DRUG THERAPY: Primary | ICD-10-CM

## 2022-01-27 LAB
ALBUMIN SERPL-MCNC: 4.3 G/DL (ref 3.5–5.2)
ALBUMIN/GLOB SERPL: 1.4 G/DL
ALP SERPL-CCNC: 219 U/L (ref 39–117)
ALT SERPL W P-5'-P-CCNC: 35 U/L (ref 1–33)
ANION GAP SERPL CALCULATED.3IONS-SCNC: 8.8 MMOL/L (ref 5–15)
AST SERPL-CCNC: 27 U/L (ref 1–32)
BILIRUB SERPL-MCNC: 0.2 MG/DL (ref 0–1.2)
BUN SERPL-MCNC: 9 MG/DL (ref 6–20)
BUN/CREAT SERPL: 9.8 (ref 7–25)
CALCIUM SPEC-SCNC: 9.8 MG/DL (ref 8.6–10.5)
CHLORIDE SERPL-SCNC: 103 MMOL/L (ref 98–107)
CO2 SERPL-SCNC: 26.2 MMOL/L (ref 22–29)
CREAT SERPL-MCNC: 0.92 MG/DL (ref 0.57–1)
GFR SERPL CREATININE-BSD FRML MDRD: 63 ML/MIN/1.73
GLOBULIN UR ELPH-MCNC: 3 GM/DL
GLUCOSE SERPL-MCNC: 99 MG/DL (ref 65–99)
POTASSIUM SERPL-SCNC: 4.3 MMOL/L (ref 3.5–5.2)
PROT SERPL-MCNC: 7.3 G/DL (ref 6–8.5)
SODIUM SERPL-SCNC: 138 MMOL/L (ref 136–145)

## 2022-01-27 PROCEDURE — 80053 COMPREHEN METABOLIC PANEL: CPT | Performed by: FAMILY MEDICINE

## 2022-02-02 ENCOUNTER — TRANSCRIBE ORDERS (OUTPATIENT)
Dept: ADMINISTRATIVE | Facility: HOSPITAL | Age: 57
End: 2022-02-02

## 2022-02-02 DIAGNOSIS — R79.89 ELEVATED LIVER FUNCTION TESTS: Primary | ICD-10-CM

## 2022-02-08 ENCOUNTER — CLINICAL SUPPORT (OUTPATIENT)
Dept: FAMILY MEDICINE CLINIC | Facility: CLINIC | Age: 57
End: 2022-02-08

## 2022-02-08 DIAGNOSIS — R53.83 FATIGUE, UNSPECIFIED TYPE: ICD-10-CM

## 2022-02-08 DIAGNOSIS — R51.9 NONINTRACTABLE HEADACHE, UNSPECIFIED CHRONICITY PATTERN, UNSPECIFIED HEADACHE TYPE: ICD-10-CM

## 2022-02-08 DIAGNOSIS — Z20.828 EXPOSURE TO SARS-ASSOCIATED CORONAVIRUS: Primary | ICD-10-CM

## 2022-02-08 LAB — SARS-COV-2 RNA PNL SPEC NAA+PROBE: NOT DETECTED

## 2022-02-08 PROCEDURE — U0004 COV-19 TEST NON-CDC HGH THRU: HCPCS | Performed by: FAMILY MEDICINE

## 2022-02-16 ENCOUNTER — HOSPITAL ENCOUNTER (OUTPATIENT)
Dept: ULTRASOUND IMAGING | Facility: HOSPITAL | Age: 57
Discharge: HOME | End: 2022-02-16
Admitting: INTERNAL MEDICINE

## 2022-02-16 DIAGNOSIS — R79.89 ELEVATED LIVER FUNCTION TESTS: ICD-10-CM

## 2022-02-16 PROCEDURE — 76700 US EXAM ABDOM COMPLETE: CPT

## 2022-03-14 DIAGNOSIS — E55.9 VITAMIN D DEFICIENCY: ICD-10-CM

## 2022-03-14 DIAGNOSIS — Z79.899 DRUG THERAPY: Primary | ICD-10-CM

## 2022-03-15 LAB
25(OH)D3 SERPL-MCNC: 31.8 NG/ML (ref 30–100)
ALBUMIN SERPL-MCNC: 4.7 G/DL (ref 3.5–5.2)
ALBUMIN/GLOB SERPL: 1.5 G/DL
ALP SERPL-CCNC: 206 U/L (ref 39–117)
ALT SERPL W P-5'-P-CCNC: 36 U/L (ref 1–33)
ANION GAP SERPL CALCULATED.3IONS-SCNC: 13.6 MMOL/L (ref 5–15)
AST SERPL-CCNC: 42 U/L (ref 1–32)
BILIRUB SERPL-MCNC: 0.3 MG/DL (ref 0–1.2)
BUN SERPL-MCNC: 12 MG/DL (ref 6–20)
BUN/CREAT SERPL: 14.1 (ref 7–25)
CALCIUM SPEC-SCNC: 9.9 MG/DL (ref 8.6–10.5)
CHLORIDE SERPL-SCNC: 99 MMOL/L (ref 98–107)
CO2 SERPL-SCNC: 23.4 MMOL/L (ref 22–29)
CREAT SERPL-MCNC: 0.85 MG/DL (ref 0.57–1)
EGFRCR SERPLBLD CKD-EPI 2021: 79.5 ML/MIN/1.73
GLOBULIN UR ELPH-MCNC: 3.1 GM/DL
GLUCOSE SERPL-MCNC: 76 MG/DL (ref 65–99)
POTASSIUM SERPL-SCNC: 4.6 MMOL/L (ref 3.5–5.2)
PROT SERPL-MCNC: 7.8 G/DL (ref 6–8.5)
SODIUM SERPL-SCNC: 136 MMOL/L (ref 136–145)

## 2022-03-15 PROCEDURE — 82306 VITAMIN D 25 HYDROXY: CPT | Performed by: FAMILY MEDICINE

## 2022-03-15 PROCEDURE — 80053 COMPREHEN METABOLIC PANEL: CPT | Performed by: FAMILY MEDICINE

## 2022-03-23 ENCOUNTER — CLINICAL SUPPORT (OUTPATIENT)
Dept: FAMILY MEDICINE CLINIC | Facility: CLINIC | Age: 57
End: 2022-03-23

## 2022-03-23 DIAGNOSIS — R42 DIZZY: Primary | ICD-10-CM

## 2022-03-23 LAB
GLUCOSE BLDC GLUCOMTR-MCNC: 99 MG/DL (ref 70–130)
GLUCOSE BLDC GLUCOMTR-MCNC: 99 MG/DL (ref 70–99)

## 2022-03-23 PROCEDURE — 82962 GLUCOSE BLOOD TEST: CPT | Performed by: FAMILY MEDICINE

## 2022-04-05 DIAGNOSIS — J22 LOWER RESPIRATORY TRACT INFECTION: Primary | ICD-10-CM

## 2022-04-05 RX ORDER — PREDNISONE 1 MG/1
TABLET ORAL TAKE AS DIRECTED
Qty: 48 TABLET | Refills: 0 | Status: SHIPPED | OUTPATIENT
Start: 2022-04-05 | End: 2022-06-08

## 2022-04-05 RX ORDER — DOXYCYCLINE HYCLATE 100 MG/1
100 CAPSULE ORAL 2 TIMES DAILY
Qty: 20 CAPSULE | Refills: 0 | Status: SHIPPED | OUTPATIENT
Start: 2022-04-05 | End: 2022-04-15

## 2022-04-16 ENCOUNTER — CLINICAL SUPPORT (OUTPATIENT)
Dept: FAMILY MEDICINE CLINIC | Facility: CLINIC | Age: 57
End: 2022-04-16

## 2022-04-16 DIAGNOSIS — J02.9 SORE THROAT: ICD-10-CM

## 2022-04-16 DIAGNOSIS — R05.9 COUGH: Primary | ICD-10-CM

## 2022-04-16 LAB
EXPIRATION DATE: ABNORMAL
EXPIRATION DATE: NORMAL
EXPIRATION DATE: NORMAL
FLUAV AG NPH QL: NEGATIVE
FLUBV AG NPH QL: NEGATIVE
INTERNAL CONTROL: ABNORMAL
INTERNAL CONTROL: NORMAL
INTERNAL CONTROL: NORMAL
Lab: ABNORMAL
Lab: NORMAL
Lab: NORMAL
S PYO AG THROAT QL: NEGATIVE
SARS-COV-2 AG UPPER RESP QL IA.RAPID: DETECTED

## 2022-04-16 PROCEDURE — 87804 INFLUENZA ASSAY W/OPTIC: CPT | Performed by: FAMILY MEDICINE

## 2022-04-16 PROCEDURE — 87880 STREP A ASSAY W/OPTIC: CPT | Performed by: FAMILY MEDICINE

## 2022-04-16 PROCEDURE — 87426 SARSCOV CORONAVIRUS AG IA: CPT | Performed by: FAMILY MEDICINE

## 2022-05-10 ENCOUNTER — CLINICAL SUPPORT (OUTPATIENT)
Dept: FAMILY MEDICINE CLINIC | Facility: CLINIC | Age: 57
End: 2022-05-10

## 2022-05-10 DIAGNOSIS — J45.909 ASTHMA, UNSPECIFIED ASTHMA SEVERITY, UNSPECIFIED WHETHER COMPLICATED, UNSPECIFIED WHETHER PERSISTENT: ICD-10-CM

## 2022-05-10 DIAGNOSIS — Z79.899 DRUG THERAPY: Primary | ICD-10-CM

## 2022-05-10 DIAGNOSIS — R25.2 MUSCLE CRAMPS: ICD-10-CM

## 2022-05-10 LAB
25(OH)D3 SERPL-MCNC: 29 NG/ML (ref 30–100)
ALBUMIN SERPL-MCNC: 4.6 G/DL (ref 3.5–5.2)
ALBUMIN/GLOB SERPL: 1.6 G/DL
ALP SERPL-CCNC: 185 U/L (ref 39–117)
ALT SERPL W P-5'-P-CCNC: 32 U/L (ref 1–33)
ANION GAP SERPL CALCULATED.3IONS-SCNC: 13.4 MMOL/L (ref 5–15)
AST SERPL-CCNC: 33 U/L (ref 1–32)
BILIRUB SERPL-MCNC: 0.2 MG/DL (ref 0–1.2)
BUN SERPL-MCNC: 18 MG/DL (ref 6–20)
BUN/CREAT SERPL: 19.4 (ref 7–25)
CALCIUM SPEC-SCNC: 9.8 MG/DL (ref 8.6–10.5)
CHLORIDE SERPL-SCNC: 99 MMOL/L (ref 98–107)
CO2 SERPL-SCNC: 25.6 MMOL/L (ref 22–29)
CREAT SERPL-MCNC: 0.93 MG/DL (ref 0.57–1)
EGFRCR SERPLBLD CKD-EPI 2021: 71.4 ML/MIN/1.73
GLOBULIN UR ELPH-MCNC: 2.8 GM/DL
GLUCOSE SERPL-MCNC: 89 MG/DL (ref 65–99)
MAGNESIUM SERPL-MCNC: 2.3 MG/DL (ref 1.6–2.6)
POTASSIUM SERPL-SCNC: 4.2 MMOL/L (ref 3.5–5.2)
PROT SERPL-MCNC: 7.4 G/DL (ref 6–8.5)
SODIUM SERPL-SCNC: 138 MMOL/L (ref 136–145)

## 2022-05-10 PROCEDURE — 36415 COLL VENOUS BLD VENIPUNCTURE: CPT | Performed by: FAMILY MEDICINE

## 2022-05-10 PROCEDURE — 83735 ASSAY OF MAGNESIUM: CPT | Performed by: FAMILY MEDICINE

## 2022-05-10 PROCEDURE — 80053 COMPREHEN METABOLIC PANEL: CPT | Performed by: FAMILY MEDICINE

## 2022-05-10 PROCEDURE — 82306 VITAMIN D 25 HYDROXY: CPT | Performed by: FAMILY MEDICINE

## 2022-05-10 NOTE — PROGRESS NOTES
Venipuncture Blood Specimen Collection  Venipuncture performed in left arm  by Shelli Arvizu with good hemostasis. Patient tolerated the procedure well without complications.   05/10/22   Shelli Arvizu

## 2022-05-11 DIAGNOSIS — M54.6 ACUTE MIDLINE THORACIC BACK PAIN: Primary | ICD-10-CM

## 2022-05-11 DIAGNOSIS — Z86.16 PERSONAL HISTORY OF COVID-19: ICD-10-CM

## 2022-05-24 DIAGNOSIS — B00.1 HERPES LABIALIS: Primary | ICD-10-CM

## 2022-05-24 DIAGNOSIS — N30.00 ACUTE CYSTITIS WITHOUT HEMATURIA: ICD-10-CM

## 2022-05-24 RX ORDER — VALACYCLOVIR HYDROCHLORIDE 1 G/1
1000 TABLET, FILM COATED ORAL DAILY
Qty: 90 TABLET | Refills: 3 | Status: SHIPPED | OUTPATIENT
Start: 2022-05-24

## 2022-05-24 RX ORDER — SULFAMETHOXAZOLE AND TRIMETHOPRIM 800; 160 MG/1; MG/1
1 TABLET ORAL DAILY
Qty: 90 TABLET | Refills: 3 | Status: SHIPPED | OUTPATIENT
Start: 2022-05-24

## 2022-05-31 PROBLEM — R00.2 PALPITATIONS: Status: ACTIVE | Noted: 2022-05-31

## 2022-06-08 ENCOUNTER — OFFICE VISIT (OUTPATIENT)
Dept: CARDIOLOGY | Facility: CLINIC | Age: 57
End: 2022-06-08

## 2022-06-08 VITALS
SYSTOLIC BLOOD PRESSURE: 128 MMHG | WEIGHT: 178 LBS | BODY MASS INDEX: 32.76 KG/M2 | HEART RATE: 84 BPM | DIASTOLIC BLOOD PRESSURE: 71 MMHG | HEIGHT: 62 IN

## 2022-06-08 DIAGNOSIS — R60.9 EDEMA, UNSPECIFIED TYPE: ICD-10-CM

## 2022-06-08 DIAGNOSIS — R00.2 PALPITATIONS: Primary | ICD-10-CM

## 2022-06-08 DIAGNOSIS — R07.89 CHEST PAIN, ATYPICAL: ICD-10-CM

## 2022-06-08 PROCEDURE — 99214 OFFICE O/P EST MOD 30 MIN: CPT | Performed by: INTERNAL MEDICINE

## 2022-06-08 NOTE — ASSESSMENT & PLAN NOTE
Patient with improvement in her symptoms monitor to showed infrequent PACs and PVCs most likely cause

## 2022-06-08 NOTE — ASSESSMENT & PLAN NOTE
Atypical in nature now resolved stress test showing no ischemic issues and good heart response activity level patient needs further work-up echo follow-up chest on as-needed basis

## 2022-06-08 NOTE — PROGRESS NOTES
Chief Complaint  Palpitations (Much better) and Shortness of Breath    Subjective    Patient reports improvement in her shortness of breath edema as well as heart palpitation symptoms since last visit.  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    Symptoms into ulnar distribution    • Voice hoarseness          Current Outpatient Medications:   •  albuterol (ACCUNEB) 1.25 MG/3ML nebulizer solution, Take 3 mL by nebulization Every 6 (Six) Hours As Needed for Wheezing., Disp: 90 mL, Rfl: 2  •  albuterol sulfate HFA (Ventolin HFA) 108 (90 Base) MCG/ACT inhaler, Inhale 1-2 puffs As Needed for Wheezing., Disp: 18 g, Rfl: 0  •  cholecalciferol (VITAMIN D3) 25 MCG (1000 UT) tablet, Take 1 tablet by mouth Daily., Disp: , Rfl:   •  EPINEPHrine (EPIPEN) 0.3 MG/0.3ML solution auto-injector injection, Inject 0.3 mL under the skin into the appropriate area as directed As Needed., Disp: , Rfl:   •  famotidine (Pepcid) 40 MG tablet, Take 1 tablet by mouth At Night As Needed for Heartburn., Disp: 90 tablet, Rfl: 3  •  folic acid (FOLVITE) 1 MG tablet, Take 1 tablet by mouth Daily., Disp: 90 tablet, Rfl: 3  •  hydroCHLOROthiazide (HYDRODIURIL) 12.5 MG tablet, Take 1 tablet by mouth Daily., Disp: 90 tablet, Rfl: 3  •  Lactobacillus (Probiotic Acidophilus) tablet, Take 1 tablet by mouth Daily., Disp: , Rfl:   •  linaclotide (Linzess) 290 MCG capsule capsule, Take 1 capsule by mouth Every Morning Before Breakfast., Disp: 90 capsule, Rfl: 3  •   "spironolactone (ALDACTONE) 50 MG tablet, Take 1 tablet (50 mg total) by mouth 1 (one) time each day., Disp: 90 tablet, Rfl: 3  •  sulfamethoxazole-trimethoprim (Bactrim DS) 800-160 MG per tablet, Take 1 tablet by mouth Daily., Disp: 90 tablet, Rfl: 3  •  ursodiol (ACTIGALL) 500 MG tablet, Take 1 tablet (500 mg total) by mouth 4 (four) times a day., Disp: 360 tablet, Rfl: 3  •  valACYclovir (VALTREX) 1000 MG tablet, Take 1 tablet by mouth Daily., Disp: 90 tablet, Rfl: 3  •  vitamin E 400 UNIT capsule, Take 1 capsule by mouth Daily., Disp: , Rfl:     Medications Discontinued During This Encounter   Medication Reason   • Obeticholic Acid (Ocaliva) 5 MG tablet *Therapy completed   • predniSONE (DELTASONE) 5 MG tablet *Therapy completed   • ursodiol (ACTIGALL) 500 MG tablet *Therapy completed   • ursodiol (ACTIGALL) 500 MG tablet *Therapy completed     Allergies   Allergen Reactions   • Cephalexin Hives   • Clindamycin Hcl Hives   • Erythromycin Hives   • Gluten Meal Hives   • Latex Shortness Of Breath and Swelling   • Peanut-Containing Drug Products Hives   • Penicillins Hives        Social History     Tobacco Use   • Smoking status: Never Smoker   • Smokeless tobacco: Never Used   Vaping Use   • Vaping Use: Never used   Substance Use Topics   • Alcohol use: Never   • Drug use: Never       Family History   Problem Relation Age of Onset   • Cervical cancer Mother    • Lung cancer Mother    • Arthritis Mother    • Heart disease Mother    • Diabetes Mother    • Osteoporosis Mother    • Lung cancer Father    • Other Father         Bladder cancer    • Arthritis Father    • Stroke Father    • Heart disease Father    • Diabetes Father    • Cervical cancer Sister    • Breast cancer Sister    • Arthritis Sister    • Stroke Sister    • Breast cancer Maternal Grandmother    • Colon cancer Other    • Melanoma Other    • Breast cancer Other         Objective     /71   Pulse 84   Ht 157.5 cm (62\")   Wt 80.7 kg (178 lb)   " BMI 32.56 kg/m²       Physical Exam    General Appearance:   · no acute distress  · Alert and oriented x3  HENT:   · lips not cyanotic  · Atraumatic  Neck:  · No jvd   · supple  Respiratory:  · no respiratory distress  · normal breath sounds  · no rales  Cardiovascular:  · Regular rate and rhythm  · no S3, no S4   · no murmur  · no rub  Extremities  · No cyanosis  · lower extremity edema: Trace  Skin:   · warm, dry  · No rashes      Result Review :     proBNP   Date Value Ref Range Status   11/22/2021 28.2 0.0 - 900.0 pg/mL Final     CMP    CMP 1/27/22 3/15/22 5/10/22   Glucose 99 76 89   BUN 9 12 18   Creatinine 0.92 0.85 0.93   eGFR Non African Am 63     Sodium 138 136 138   Potassium 4.3 4.6 4.2   Chloride 103 99 99   Calcium 9.8 9.9 9.8   Albumin 4.30 4.70 4.60   Total Bilirubin 0.2 0.3 0.2   Alkaline Phosphatase 219 (A) 206 (A) 185 (A)   AST (SGOT) 27 42 (A) 33 (A)   ALT (SGPT) 35 (A) 36 (A) 32   (A) Abnormal value       Comments are available for some flowsheets but are not being displayed.           CBC w/diff    CBC w/Diff 8/19/21 9/20/21 10/21/21   WBC 4.92 5.75 5.20   RBC 4.33 4.36 4.40   Hemoglobin 12.3 12.6 12.5   Hematocrit 38.8 39.6 39.8   MCV 89.6 90.8 90.5   MCH 28.4 28.9 28.4   MCHC 31.7 31.8 31.4 (A)   RDW 13.2 13.2 13.2   Platelets 326 351 360   Neutrophil Rel % 53.5 55.7 62.9   Immature Granulocyte Rel % 0.2 0.2 0.2   Lymphocyte Rel % 32.9 29.0 24.6   Monocyte Rel % 7.5 6.6 6.5   Eosinophil Rel % 5.5 8.0 (A) 5.4   Basophil Rel % 0.4 0.5 0.4   (A) Abnormal value             Lab Results   Component Value Date    TSH 1.990 10/21/2021      Lab Results   Component Value Date    FREET4 1.39 10/21/2021      No results found for: DDIMERQUANT  Magnesium   Date Value Ref Range Status   05/10/2022 2.3 1.6 - 2.6 mg/dL Final      No results found for: DIGOXIN   No results found for: TROPONINT        Lipid Panel    Lipid Panel 7/29/21   Total Cholesterol 209 (A)   Triglycerides 52   HDL Cholesterol 66 (A)    VLDL Cholesterol 9   LDL Cholesterol  134 (A)   LDL/HDL Ratio 2.01   (A) Abnormal value            No results found for: POCTROP    Results for orders placed in visit on 11/23/21    Adult Transthoracic Echo Complete W/ Cont if Necessary Per Protocol    Interpretation Summary  · Calculated left ventricular EF = 64% Estimated left ventricular EF was in agreement with the calculated left ventricular EF.                 Diagnoses and all orders for this visit:    1. Palpitations (Primary)  Assessment & Plan:  Patient with improvement in her symptoms monitor to showed infrequent PACs and PVCs most likely cause      2. Chest pain, atypical  Assessment & Plan:  Atypical in nature now resolved stress test showing no ischemic issues and good heart response activity level patient needs further work-up echo follow-up chest on as-needed basis      3. Edema, unspecified type  Assessment & Plan:  Symptomatically improved and nearly resolved no evidence of CHF based on echocardiogram and BNP             Follow Up     No follow-ups on file.          Patient was given instructions and counseling regarding her condition or for health maintenance advice. Please see specific information pulled into the AVS if appropriate.

## 2022-06-18 ENCOUNTER — DOCUMENTATION (OUTPATIENT)
Dept: FAMILY MEDICINE CLINIC | Facility: CLINIC | Age: 57
End: 2022-06-18

## 2022-06-18 DIAGNOSIS — R42 DIZZINESS: Primary | ICD-10-CM

## 2022-06-18 DIAGNOSIS — R07.81 PLEURITIC CHEST PAIN: ICD-10-CM

## 2022-06-18 PROCEDURE — 93000 ELECTROCARDIOGRAM COMPLETE: CPT | Performed by: NURSE PRACTITIONER

## 2022-06-18 NOTE — PROGRESS NOTES
Sudden onset while driving to work of severe dizziness with any head movement and very mild pleuritic left-sided chest pains    _____________________________________    EKG done normal sinus rhythm nothing concerning

## 2022-07-14 DIAGNOSIS — E53.8 FOLIC ACID DEFICIENCY: ICD-10-CM

## 2022-07-14 DIAGNOSIS — K74.3 PRIMARY BILIARY CIRRHOSIS: ICD-10-CM

## 2022-07-14 DIAGNOSIS — K59.00 CONSTIPATION, UNSPECIFIED CONSTIPATION TYPE: ICD-10-CM

## 2022-07-14 DIAGNOSIS — R42 DIZZINESS: ICD-10-CM

## 2022-07-14 DIAGNOSIS — R00.2 PALPITATIONS: ICD-10-CM

## 2022-07-14 DIAGNOSIS — E55.9 VITAMIN D DEFICIENCY: Primary | ICD-10-CM

## 2022-07-14 DIAGNOSIS — R60.9 EDEMA, UNSPECIFIED TYPE: ICD-10-CM

## 2022-07-14 RX ORDER — FOLIC ACID 1 MG/1
1 TABLET ORAL DAILY
Qty: 90 TABLET | Refills: 3 | Status: SHIPPED | OUTPATIENT
Start: 2022-07-14

## 2022-07-14 RX ORDER — HYDROCHLOROTHIAZIDE 12.5 MG/1
12.5 TABLET ORAL DAILY
Qty: 90 TABLET | Refills: 3 | Status: SHIPPED | OUTPATIENT
Start: 2022-07-14

## 2022-08-10 ENCOUNTER — CLINICAL SUPPORT (OUTPATIENT)
Dept: FAMILY MEDICINE CLINIC | Facility: CLINIC | Age: 57
End: 2022-08-10

## 2022-08-10 DIAGNOSIS — R42 DIZZINESS: ICD-10-CM

## 2022-08-10 DIAGNOSIS — E55.9 VITAMIN D DEFICIENCY: ICD-10-CM

## 2022-08-10 DIAGNOSIS — K74.3 PRIMARY BILIARY CIRRHOSIS: ICD-10-CM

## 2022-08-10 DIAGNOSIS — R00.2 PALPITATIONS: ICD-10-CM

## 2022-08-10 LAB
25(OH)D3 SERPL-MCNC: 42.4 NG/ML (ref 30–100)
ALBUMIN SERPL-MCNC: 4.5 G/DL (ref 3.5–5.2)
ALBUMIN/GLOB SERPL: 1.7 G/DL
ALP SERPL-CCNC: 206 U/L (ref 39–117)
ALT SERPL W P-5'-P-CCNC: 28 U/L (ref 1–33)
ANION GAP SERPL CALCULATED.3IONS-SCNC: 11.8 MMOL/L (ref 5–15)
AST SERPL-CCNC: 35 U/L (ref 1–32)
BASOPHILS # BLD AUTO: 0.01 10*3/MM3 (ref 0–0.2)
BASOPHILS NFR BLD AUTO: 0.2 % (ref 0–1.5)
BILIRUB SERPL-MCNC: 0.3 MG/DL (ref 0–1.2)
BUN SERPL-MCNC: 18 MG/DL (ref 6–20)
BUN/CREAT SERPL: 18.4 (ref 7–25)
CALCIUM SPEC-SCNC: 9.5 MG/DL (ref 8.6–10.5)
CHLORIDE SERPL-SCNC: 98 MMOL/L (ref 98–107)
CO2 SERPL-SCNC: 26.2 MMOL/L (ref 22–29)
CREAT SERPL-MCNC: 0.98 MG/DL (ref 0.57–1)
DEPRECATED RDW RBC AUTO: 47.1 FL (ref 37–54)
EGFRCR SERPLBLD CKD-EPI 2021: 66.6 ML/MIN/1.73
EOSINOPHIL # BLD AUTO: 0.24 10*3/MM3 (ref 0–0.4)
EOSINOPHIL NFR BLD AUTO: 5.1 % (ref 0.3–6.2)
ERYTHROCYTE [DISTWIDTH] IN BLOOD BY AUTOMATED COUNT: 13.4 % (ref 12.3–15.4)
FOLATE SERPL-MCNC: 13.6 NG/ML (ref 4.78–24.2)
GLOBULIN UR ELPH-MCNC: 2.7 GM/DL
GLUCOSE SERPL-MCNC: 83 MG/DL (ref 65–99)
HCT VFR BLD AUTO: 39.5 % (ref 34–46.6)
HGB BLD-MCNC: 13.1 G/DL (ref 12–15.9)
IMM GRANULOCYTES # BLD AUTO: 0.01 10*3/MM3 (ref 0–0.05)
IMM GRANULOCYTES NFR BLD AUTO: 0.2 % (ref 0–0.5)
LYMPHOCYTES # BLD AUTO: 1.26 10*3/MM3 (ref 0.7–3.1)
LYMPHOCYTES NFR BLD AUTO: 26.7 % (ref 19.6–45.3)
MAGNESIUM SERPL-MCNC: 2.1 MG/DL (ref 1.6–2.6)
MCH RBC QN AUTO: 31.5 PG (ref 26.6–33)
MCHC RBC AUTO-ENTMCNC: 33.2 G/DL (ref 31.5–35.7)
MCV RBC AUTO: 95 FL (ref 79–97)
MONOCYTES # BLD AUTO: 0.37 10*3/MM3 (ref 0.1–0.9)
MONOCYTES NFR BLD AUTO: 7.8 % (ref 5–12)
NEUTROPHILS NFR BLD AUTO: 2.83 10*3/MM3 (ref 1.7–7)
NEUTROPHILS NFR BLD AUTO: 60 % (ref 42.7–76)
NRBC BLD AUTO-RTO: 0 /100 WBC (ref 0–0.2)
PLATELET # BLD AUTO: 336 10*3/MM3 (ref 140–450)
PMV BLD AUTO: 10.2 FL (ref 6–12)
POTASSIUM SERPL-SCNC: 4 MMOL/L (ref 3.5–5.2)
PROT SERPL-MCNC: 7.2 G/DL (ref 6–8.5)
RBC # BLD AUTO: 4.16 10*6/MM3 (ref 3.77–5.28)
SODIUM SERPL-SCNC: 136 MMOL/L (ref 136–145)
TSH SERPL DL<=0.05 MIU/L-ACNC: 2.47 UIU/ML (ref 0.27–4.2)
VIT B12 BLD-MCNC: 529 PG/ML (ref 211–946)
WBC NRBC COR # BLD: 4.72 10*3/MM3 (ref 3.4–10.8)

## 2022-08-10 PROCEDURE — 82746 ASSAY OF FOLIC ACID SERUM: CPT | Performed by: FAMILY MEDICINE

## 2022-08-10 PROCEDURE — 82306 VITAMIN D 25 HYDROXY: CPT | Performed by: FAMILY MEDICINE

## 2022-08-10 PROCEDURE — 82607 VITAMIN B-12: CPT | Performed by: FAMILY MEDICINE

## 2022-08-10 PROCEDURE — 36415 COLL VENOUS BLD VENIPUNCTURE: CPT | Performed by: FAMILY MEDICINE

## 2022-08-10 PROCEDURE — 83735 ASSAY OF MAGNESIUM: CPT | Performed by: FAMILY MEDICINE

## 2022-08-10 PROCEDURE — 80050 GENERAL HEALTH PANEL: CPT | Performed by: FAMILY MEDICINE

## 2022-08-10 NOTE — PROGRESS NOTES
Venipuncture Blood Specimen Collection  Venipuncture performed in left arm  by Shelli Arvizu with good hemostasis. Patient tolerated the procedure well without complications.   08/10/22   Shelli Arvizu

## 2022-08-23 DIAGNOSIS — K21.9 GASTROESOPHAGEAL REFLUX DISEASE WITHOUT ESOPHAGITIS: ICD-10-CM

## 2022-08-23 RX ORDER — FAMOTIDINE 40 MG/1
40 TABLET, FILM COATED ORAL NIGHTLY PRN
Qty: 90 TABLET | Refills: 3 | Status: SHIPPED | OUTPATIENT
Start: 2022-08-23

## 2022-10-18 DIAGNOSIS — J45.901 MODERATE ASTHMA WITH EXACERBATION, UNSPECIFIED WHETHER PERSISTENT: Primary | ICD-10-CM

## 2022-10-18 RX ORDER — METHYLPREDNISOLONE 4 MG/1
TABLET ORAL
Qty: 21 TABLET | Refills: 0 | Status: SHIPPED | OUTPATIENT
Start: 2022-10-18

## 2022-10-19 DIAGNOSIS — E04.1 THYROID NODULE: Primary | ICD-10-CM

## 2022-10-25 DIAGNOSIS — J45.31 MILD PERSISTENT ASTHMA WITH EXACERBATION: ICD-10-CM

## 2022-10-25 RX ORDER — ALBUTEROL SULFATE 90 UG/1
1-2 AEROSOL, METERED RESPIRATORY (INHALATION) AS NEEDED
Qty: 54 G | Refills: 3 | Status: SHIPPED | OUTPATIENT
Start: 2022-10-25

## 2022-10-27 DIAGNOSIS — Z79.899 DRUG THERAPY: Primary | ICD-10-CM

## 2022-10-27 PROCEDURE — 80053 COMPREHEN METABOLIC PANEL: CPT | Performed by: FAMILY MEDICINE

## 2022-10-28 LAB
ALBUMIN SERPL-MCNC: 4.4 G/DL (ref 3.5–5.2)
ALBUMIN/GLOB SERPL: 1.6 G/DL
ALP SERPL-CCNC: 187 U/L (ref 39–117)
ALT SERPL W P-5'-P-CCNC: 37 U/L (ref 1–33)
ANION GAP SERPL CALCULATED.3IONS-SCNC: 9 MMOL/L (ref 5–15)
AST SERPL-CCNC: 31 U/L (ref 1–32)
BILIRUB SERPL-MCNC: 0.2 MG/DL (ref 0–1.2)
BUN SERPL-MCNC: 15 MG/DL (ref 6–20)
BUN/CREAT SERPL: 14.6 (ref 7–25)
CALCIUM SPEC-SCNC: 9.3 MG/DL (ref 8.6–10.5)
CHLORIDE SERPL-SCNC: 99 MMOL/L (ref 98–107)
CO2 SERPL-SCNC: 28 MMOL/L (ref 22–29)
CREAT SERPL-MCNC: 1.03 MG/DL (ref 0.57–1)
EGFRCR SERPLBLD CKD-EPI 2021: 62.8 ML/MIN/1.73
GLOBULIN UR ELPH-MCNC: 2.7 GM/DL
GLUCOSE SERPL-MCNC: 94 MG/DL (ref 65–99)
POTASSIUM SERPL-SCNC: 4.1 MMOL/L (ref 3.5–5.2)
PROT SERPL-MCNC: 7.1 G/DL (ref 6–8.5)
SODIUM SERPL-SCNC: 136 MMOL/L (ref 136–145)

## 2022-10-29 DIAGNOSIS — J10.1 INFLUENZA B: Primary | ICD-10-CM

## 2022-10-29 DIAGNOSIS — J10.1 INFLUENZA B: ICD-10-CM

## 2022-10-29 RX ORDER — OSELTAMIVIR PHOSPHATE 75 MG/1
75 CAPSULE ORAL 2 TIMES DAILY
Qty: 10 CAPSULE | Refills: 0 | Status: SHIPPED | OUTPATIENT
Start: 2022-10-29 | End: 2022-11-03

## 2022-10-29 RX ORDER — OSELTAMIVIR PHOSPHATE 75 MG/1
75 CAPSULE ORAL 2 TIMES DAILY
COMMUNITY
Start: 2022-10-29 | End: 2022-10-29 | Stop reason: SDUPTHER

## 2022-11-10 ENCOUNTER — CLINICAL SUPPORT (OUTPATIENT)
Dept: FAMILY MEDICINE CLINIC | Facility: CLINIC | Age: 57
End: 2022-11-10

## 2022-11-10 DIAGNOSIS — Z23 NEED FOR INFLUENZA VACCINATION: Primary | ICD-10-CM

## 2022-11-10 PROCEDURE — 90686 IIV4 VACC NO PRSV 0.5 ML IM: CPT | Performed by: FAMILY MEDICINE

## 2022-11-10 PROCEDURE — 90471 IMMUNIZATION ADMIN: CPT | Performed by: FAMILY MEDICINE

## 2022-11-30 DIAGNOSIS — R60.9 EDEMA, UNSPECIFIED TYPE: Primary | ICD-10-CM

## 2022-11-30 RX ORDER — SPIRONOLACTONE 50 MG/1
TABLET, FILM COATED ORAL
Qty: 90 TABLET | Refills: 0 | Status: SHIPPED | OUTPATIENT
Start: 2022-11-30

## 2022-12-01 ENCOUNTER — TRANSCRIBE ORDERS (OUTPATIENT)
Dept: ADMINISTRATIVE | Facility: HOSPITAL | Age: 57
End: 2022-12-01

## 2022-12-01 DIAGNOSIS — Z12.31 VISIT FOR SCREENING MAMMOGRAM: Primary | ICD-10-CM

## 2022-12-02 DIAGNOSIS — B35.1 ONYCHOMYCOSIS: Primary | ICD-10-CM

## 2022-12-06 ENCOUNTER — HOSPITAL ENCOUNTER (OUTPATIENT)
Dept: ULTRASOUND IMAGING | Facility: HOSPITAL | Age: 57
Discharge: HOME | End: 2022-12-06
Admitting: OTOLARYNGOLOGY

## 2022-12-06 DIAGNOSIS — E04.1 THYROID NODULE: ICD-10-CM

## 2022-12-06 PROCEDURE — 76536 US EXAM OF HEAD AND NECK: CPT

## 2022-12-08 ENCOUNTER — OFFICE VISIT (OUTPATIENT)
Dept: OTOLARYNGOLOGY | Facility: CLINIC | Age: 57
End: 2022-12-08

## 2022-12-08 VITALS — HEIGHT: 62 IN | TEMPERATURE: 97.7 F | BODY MASS INDEX: 33.45 KG/M2 | WEIGHT: 181.8 LBS

## 2022-12-08 DIAGNOSIS — E04.1 THYROID NODULE: Primary | ICD-10-CM

## 2022-12-08 DIAGNOSIS — R42 VERTIGO: ICD-10-CM

## 2022-12-08 PROCEDURE — 99213 OFFICE O/P EST LOW 20 MIN: CPT | Performed by: OTOLARYNGOLOGY

## 2022-12-08 NOTE — PROGRESS NOTES
Patient Name: Josette Carcamo   Visit Date: 11/18/2021   Patient ID: [id number]  Provider: Jayden Doherty MD    Sex: female  Location: [facility]   YOB: 1965  Location Address: [address] [place] [address], [address],?[place]?[id number]    Primary Care Provider Nicholas Doherty MD  Location Phone: [phone]    Referring Provider: No ref. provider found        Chief Complaint  1 year follow up w/ US    History of Present Illness  Josette Carcamo is a 57 y.o. female with past medical history significant for primary biliary cirrhosis, celiac disease, chronic Karen-Barr viral infection, allergic rhinitis, and asthma who returns today for follow-up of thyroid nodules and hoarseness.  She was originally seen on 6/7/18 after 8-9 years of daily hoarseness.  Examination that day revealed evidence consistent with reflux versus allergies.  She was then seen on 10/4/2018 thyroid nodules that she had known about for years having previously undergone multiple ultrasounds and even an ultrasound-guided FNA in the past.  Her only previous FNA on record occurred on 8/29/05 and revealed the right nodule to be consistent with a cyst.  Thyroid ultrasound occurred on 9/24/18 and revealed a right 0.9 cm cyst, several tiny nodules measuring up to 0.5 cm, and a solid and cystic nodule measuring 0.8 cm.  She denies any family history of thyroid cancer or personal history of excessive radiation exposure.  She also discussed issues with dysphasia she had a significant work-up in the past without definitive diagnosis.  At that time we elected to continue observation of the thyroid. Repeat thyroid ultrasound on 10/29/2019 revealed a right solid and cystic 0.9 cm nodule which was previously 0.8 cm, a right 0.9 cm solid and cystic previously measuring 0.9 cm, a right 0.6 cm hypoechoic nodule previously measuring 0.5 cm, a right 0.5 cm hypoechoic nodule which was not previously measured. Thyroid  ultrasound on 11/2/2020 reveals multiple stable right-sided cystic and solid and cystic nodules. Thyroid ultrasound on 11/10/2021 revealed a right 0.8 x 0.9 x 1.2 cm solid and cystic nodule, right 1 cm cystic nodule, right 0.6 cm hypoechoic nodule, and right 0.4 cm cystic nodule.  There are a few tiny left-sided nodules less than 0.5 cm noted.     She returns today for follow-up having last been seen on 11/18/2021.  She tells me that  she was again diagnosed with COVID in April for which she had significant respiratory issues.  She is not experiencing any difficulty with neck pain or pressure or shortness of breath.  She does report stable hoarseness and cervical dysphagia with solids.  She sees a gastroenterologist and has undergone esophageal dilation in the past. Thyroid ultrasound on 12/6/2022 revealed a right 0.6 cm likely colloid nodule, a right mid 0.5 cm hypoechoic nodule, a right mid 0.6 cm nodule, and right inferior 0.4 cm hypoechoic nodule.  The 2 largest nodules are smaller than previously.  Her most recent thyroid function testing revealed a normal TSH of 2.470 on 8/10/2022.  She also mentions that she has experienced a few episodes of brief positional vertigo while in bed.  They last just a few seconds prior to resolving.    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    Symptoms into ulnar distribution    • Voice hoarseness        Past Surgical History:   Procedure Laterality Date   • BLADDER SUSPENSION      Age 32   • CARPAL TUNNEL RELEASE  02/08/2019    Right CTR & right ulnar nerve  decompression   • CHOLECYSTECTOMY     • DILATATION AND CURETTAGE      Multiple    • HYSTERECTOMY      Partial   • TONSILLECTOMY      Age 14   • TUBAL ABDOMINAL LIGATION      Age 27         Current Outpatient Medications:   •  albuterol (ACCUNEB) 1.25 MG/3ML nebulizer solution, Take 3 mL by nebulization Every 6 (Six) Hours As Needed for Wheezing., Disp: 90 mL, Rfl: 2  •  cholecalciferol (VITAMIN D3) 25 MCG (1000 UT) tablet, Take 1 tablet by mouth Daily., Disp: , Rfl:   •  ciclopirox (PENLAC) 8 % solution, Apply 1 application topically to the appropriate area as directed Every Night., Disp: 6.6 mL, Rfl: 1  •  EPINEPHrine (EPIPEN) 0.3 MG/0.3ML solution auto-injector injection, Inject 0.3 mL under the skin into the appropriate area as directed As Needed., Disp: , Rfl:   •  famotidine (Pepcid) 40 MG tablet, Take 1 tablet by mouth At Night As Needed for Heartburn., Disp: 90 tablet, Rfl: 3  •  folic acid (FOLVITE) 1 MG tablet, Take 1 tablet by mouth Daily., Disp: 90 tablet, Rfl: 3  •  hydroCHLOROthiazide (HYDRODIURIL) 12.5 MG tablet, Take 1 tablet by mouth Daily., Disp: 90 tablet, Rfl: 3  •  Lactobacillus (Probiotic Acidophilus) tablet, Take 1 tablet by mouth Daily., Disp: , Rfl:   •  linaclotide (Linzess) 290 MCG capsule capsule, Take 1 capsule by mouth Every Morning Before Breakfast., Disp: 90 capsule, Rfl: 3  •  spironolactone (ALDACTONE) 50 MG tablet, Take 1 tablet (50 mg total) by mouth 1 (one) time each day., Disp: 90 tablet, Rfl: 0  •  sulfamethoxazole-trimethoprim (Bactrim DS) 800-160 MG per tablet, Take 1 tablet by mouth Daily., Disp: 90 tablet, Rfl: 3  •  ursodiol (ACTIGALL) 500 MG tablet, Take 1 tablet (500 mg total) by mouth 4 (four) times a day., Disp: 360 tablet, Rfl: 3  •  valACYclovir (VALTREX) 1000 MG tablet, Take 1 tablet by mouth Daily., Disp: 90 tablet, Rfl: 3  •  Ventolin  (90 Base) MCG/ACT inhaler, Inhale 1-2 puffs 4 times a day As Needed for Wheezing., Disp: 54 g, Rfl: 3  •  vitamin E 400 UNIT  "capsule, Take 1 capsule by mouth Daily., Disp: , Rfl:   •  methylPREDNISolone (MEDROL) 4 MG dose pack, Take as directed on package instructions., Disp: 21 tablet, Rfl: 0     Allergies   Allergen Reactions   • Cephalexin Hives   • Clindamycin Hcl Hives   • Erythromycin Hives   • Gluten Meal Hives   • Latex Shortness Of Breath and Swelling   • Peanut-Containing Drug Products Hives   • Penicillins Hives       Social History     Tobacco Use   • Smoking status: Never   • Smokeless tobacco: Never   Vaping Use   • Vaping Use: Never used   Substance Use Topics   • Alcohol use: Never   • Drug use: Never        Objective     Vital Signs:   Temp 97.7 °F (36.5 °C) (Temporal)   Ht 157.5 cm (62\")   Wt 82.5 kg (181 lb 12.8 oz)   BMI 33.25 kg/m²       Physical Exam    General: Well developed, well nourished patient of stated age in no acute distress. Voice is strong and clear.   Head: Normocephalic and atraumatic.  Face: No lesions.  Bilateral parotid and submandibular glands are unremarkable.  Stensen's and Warthin's ducts are productive of clear saliva bilaterally.  House-Brackmann I/VI     bilaterally.   muscles and temporomandibular joint nontender to palpation.  No TMJ crepitus.  Eyes: PERRLA, sclerae anicteric, no conjunctival injection. Extra ocular movements are intact and full. No nystagmus.   Ears: Auricles are normal in appearance. Bilateral external auditory canals are unremarkable. Bilateral tympanic membranes are clear and without effusion. Hearing normal to conversational voice.   Nose: External nose is normal in appearance. Bilateral nares are patent with normal appearing mucosa. Septum midline. Turbinates are unremarkable. No lesions.   Oral Cavity: Lips are normal in appearance aside from a left lower lip lateral fibroma measuring approximately 3 mm. Oral mucosa is unremarkable. Gingiva is unremarkable. Normal dentition for age. Tongue is unremarkable with good movement. Hard palate is " unremarkable.   Oropharynx: Soft palate is unremarkable with full movement. Uvula is unremarkable. Bilateral tonsils are surgically absent. Posterior oropharynx is unremarkable.    Larynx and hypopharynx: Deferred secondary to gag reflex.  Neck: Supple.  No mass.  Nontender to palpation.  Trachea midline. Thyroid normal size and without nodules to palpation.   Lymphatic: No lymphadenopathy upon palpation.   Psychiatric: Appropriate affect, cooperative   Neurologic: Oriented x 3, strength symmetric in all extremities, Cranial Nerves II-XII are grossly intact to confrontation.  [place]-Hallpike testing is negative.   Skin: Warm and dry. No rashes.    Procedures           Result Review :               Assessment and Plan    Diagnoses and all orders for this visit:    1. Thyroid nodule (Primary)  -     US Thyroid; Future    2. Vertigo    Impressions and findings were discussed.  We reviewed and discussed the images from her 12/6/2022 thyroid ultrasound which revealed that her to larger nodules have decreased in size and the others remain stable.  We discussed the pathophysiology and natural history of this condition and we will pursue a repeat thyroid ultrasound in 1 year for continued observation.  In regards to her brief positional vertigo we discussed that this is likely benign paroxysmal positional vertigo although Manuel-Hallpike testing was negative on examination today.  She was given ample time to ask questions, all of which were answered to her satisfaction.      Follow Up   Return in about 1 year (around 12/8/2023).  Patient was given instructions and counseling regarding her condition or for health maintenance advice. Please see specific information pulled into the AVS if appropriate.

## 2023-01-23 DIAGNOSIS — Z79.899 DRUG THERAPY: Primary | ICD-10-CM

## 2023-01-24 ENCOUNTER — CLINICAL SUPPORT (OUTPATIENT)
Dept: FAMILY MEDICINE CLINIC | Facility: CLINIC | Age: 58
End: 2023-01-24
Payer: COMMERCIAL

## 2023-01-24 DIAGNOSIS — T78.2XXS ANAPHYLAXIS, SEQUELA: ICD-10-CM

## 2023-01-24 DIAGNOSIS — R07.89 CHEST PAIN, ATYPICAL: ICD-10-CM

## 2023-01-24 LAB
ALBUMIN SERPL-MCNC: 4.7 G/DL (ref 3.5–5.2)
ALBUMIN/GLOB SERPL: 2 G/DL
ALP SERPL-CCNC: 210 U/L (ref 39–117)
ALT SERPL W P-5'-P-CCNC: 30 U/L (ref 1–33)
ANION GAP SERPL CALCULATED.3IONS-SCNC: 8.5 MMOL/L (ref 5–15)
AST SERPL-CCNC: 31 U/L (ref 1–32)
BILIRUB SERPL-MCNC: <0.2 MG/DL (ref 0–1.2)
BUN SERPL-MCNC: 9 MG/DL (ref 6–20)
BUN/CREAT SERPL: 8.8 (ref 7–25)
CALCIUM SPEC-SCNC: 9.8 MG/DL (ref 8.6–10.5)
CHLORIDE SERPL-SCNC: 100 MMOL/L (ref 98–107)
CO2 SERPL-SCNC: 30.5 MMOL/L (ref 22–29)
CREAT SERPL-MCNC: 1.02 MG/DL (ref 0.57–1)
EGFRCR SERPLBLD CKD-EPI 2021: 63.5 ML/MIN/1.73
GLOBULIN UR ELPH-MCNC: 2.4 GM/DL
GLUCOSE SERPL-MCNC: 93 MG/DL (ref 65–99)
POTASSIUM SERPL-SCNC: 4.8 MMOL/L (ref 3.5–5.2)
PROT SERPL-MCNC: 7.1 G/DL (ref 6–8.5)
SODIUM SERPL-SCNC: 139 MMOL/L (ref 136–145)

## 2023-01-24 PROCEDURE — 36415 COLL VENOUS BLD VENIPUNCTURE: CPT | Performed by: FAMILY MEDICINE

## 2023-01-24 PROCEDURE — 80053 COMPREHEN METABOLIC PANEL: CPT | Performed by: FAMILY MEDICINE

## 2023-01-24 NOTE — PROGRESS NOTES
..  Venipuncture Blood Specimen Collection  Venipuncture performed in left arm by Freda Weeks with good hemostasis. Patient tolerated the procedure well without complications.   01/24/23   Freda Weeks

## 2023-01-26 ENCOUNTER — HOSPITAL ENCOUNTER (OUTPATIENT)
Dept: MAMMOGRAPHY | Facility: HOSPITAL | Age: 58
Discharge: HOME | End: 2023-01-26
Admitting: FAMILY MEDICINE
Payer: COMMERCIAL

## 2023-01-26 DIAGNOSIS — Z12.31 VISIT FOR SCREENING MAMMOGRAM: ICD-10-CM

## 2023-01-26 PROCEDURE — 77063 BREAST TOMOSYNTHESIS BI: CPT

## 2023-01-26 PROCEDURE — 77067 SCR MAMMO BI INCL CAD: CPT

## 2023-03-02 DIAGNOSIS — Z79.899 DRUG THERAPY: Primary | ICD-10-CM

## 2023-03-02 LAB
ALBUMIN SERPL-MCNC: 4.5 G/DL (ref 3.5–5.2)
ALBUMIN/GLOB SERPL: 1.5 G/DL
ALP SERPL-CCNC: 219 U/L (ref 39–117)
ALT SERPL W P-5'-P-CCNC: 34 U/L (ref 1–33)
ANION GAP SERPL CALCULATED.3IONS-SCNC: 9.4 MMOL/L (ref 5–15)
AST SERPL-CCNC: 30 U/L (ref 1–32)
BILIRUB SERPL-MCNC: <0.2 MG/DL (ref 0–1.2)
BUN SERPL-MCNC: 14 MG/DL (ref 6–20)
BUN/CREAT SERPL: 14 (ref 7–25)
CALCIUM SPEC-SCNC: 9.3 MG/DL (ref 8.6–10.5)
CHLORIDE SERPL-SCNC: 103 MMOL/L (ref 98–107)
CO2 SERPL-SCNC: 28.6 MMOL/L (ref 22–29)
CREAT SERPL-MCNC: 1 MG/DL (ref 0.57–1)
EGFRCR SERPLBLD CKD-EPI 2021: 65 ML/MIN/1.73
GLOBULIN UR ELPH-MCNC: 3.1 GM/DL
GLUCOSE SERPL-MCNC: 93 MG/DL (ref 65–99)
POTASSIUM SERPL-SCNC: 4.4 MMOL/L (ref 3.5–5.2)
PROT SERPL-MCNC: 7.6 G/DL (ref 6–8.5)
SODIUM SERPL-SCNC: 141 MMOL/L (ref 136–145)

## 2023-03-02 PROCEDURE — 80053 COMPREHEN METABOLIC PANEL: CPT | Performed by: FAMILY MEDICINE

## 2023-03-09 DIAGNOSIS — J10.1 INFLUENZA B: ICD-10-CM

## 2023-03-09 DIAGNOSIS — J02.0 STREP PHARYNGITIS: Primary | ICD-10-CM

## 2023-03-09 RX ORDER — DOXYCYCLINE HYCLATE 100 MG/1
100 CAPSULE ORAL 2 TIMES DAILY
Qty: 20 CAPSULE | Refills: 0 | Status: SHIPPED | OUTPATIENT
Start: 2023-03-09 | End: 2023-03-19

## 2023-03-09 RX ORDER — OSELTAMIVIR PHOSPHATE 75 MG/1
75 CAPSULE ORAL 2 TIMES DAILY
Qty: 10 CAPSULE | Refills: 0 | Status: SHIPPED | OUTPATIENT
Start: 2023-03-09 | End: 2023-03-14

## 2023-04-13 ENCOUNTER — CLINICAL SUPPORT (OUTPATIENT)
Dept: FAMILY MEDICINE CLINIC | Facility: CLINIC | Age: 58
End: 2023-04-13
Payer: COMMERCIAL

## 2023-04-13 DIAGNOSIS — E55.9 VITAMIN D DEFICIENCY: ICD-10-CM

## 2023-04-13 DIAGNOSIS — K74.3 PRIMARY BILIARY CIRRHOSIS: ICD-10-CM

## 2023-04-13 DIAGNOSIS — R53.83 OTHER FATIGUE: ICD-10-CM

## 2023-04-13 LAB
25(OH)D3 SERPL-MCNC: 39.4 NG/ML (ref 30–100)
ALBUMIN SERPL-MCNC: 4.6 G/DL (ref 3.5–5.2)
ALBUMIN/GLOB SERPL: 1.9 G/DL
ALP SERPL-CCNC: 189 U/L (ref 39–117)
ALT SERPL W P-5'-P-CCNC: 35 U/L (ref 1–33)
ANION GAP SERPL CALCULATED.3IONS-SCNC: 10.8 MMOL/L (ref 5–15)
AST SERPL-CCNC: 32 U/L (ref 1–32)
BASOPHILS # BLD AUTO: 0.03 10*3/MM3 (ref 0–0.2)
BASOPHILS NFR BLD AUTO: 0.6 % (ref 0–1.5)
BILIRUB SERPL-MCNC: 0.2 MG/DL (ref 0–1.2)
BUN SERPL-MCNC: 13 MG/DL (ref 6–20)
BUN/CREAT SERPL: 12.3 (ref 7–25)
CALCIUM SPEC-SCNC: 9.7 MG/DL (ref 8.6–10.5)
CHLORIDE SERPL-SCNC: 98 MMOL/L (ref 98–107)
CO2 SERPL-SCNC: 27.2 MMOL/L (ref 22–29)
CREAT SERPL-MCNC: 1.06 MG/DL (ref 0.57–1)
DEPRECATED RDW RBC AUTO: 43.9 FL (ref 37–54)
EGFRCR SERPLBLD CKD-EPI 2021: 60.6 ML/MIN/1.73
EOSINOPHIL # BLD AUTO: 0.31 10*3/MM3 (ref 0–0.4)
EOSINOPHIL NFR BLD AUTO: 6 % (ref 0.3–6.2)
ERYTHROCYTE [DISTWIDTH] IN BLOOD BY AUTOMATED COUNT: 13.1 % (ref 12.3–15.4)
FOLATE SERPL-MCNC: >20 NG/ML (ref 4.78–24.2)
GLOBULIN UR ELPH-MCNC: 2.4 GM/DL
GLUCOSE SERPL-MCNC: 107 MG/DL (ref 65–99)
HCT VFR BLD AUTO: 39.1 % (ref 34–46.6)
HGB BLD-MCNC: 13.2 G/DL (ref 12–15.9)
IMM GRANULOCYTES # BLD AUTO: 0 10*3/MM3 (ref 0–0.05)
IMM GRANULOCYTES NFR BLD AUTO: 0 % (ref 0–0.5)
LYMPHOCYTES # BLD AUTO: 1.55 10*3/MM3 (ref 0.7–3.1)
LYMPHOCYTES NFR BLD AUTO: 29.9 % (ref 19.6–45.3)
MCH RBC QN AUTO: 31.3 PG (ref 26.6–33)
MCHC RBC AUTO-ENTMCNC: 33.8 G/DL (ref 31.5–35.7)
MCV RBC AUTO: 92.7 FL (ref 79–97)
MONOCYTES # BLD AUTO: 0.31 10*3/MM3 (ref 0.1–0.9)
MONOCYTES NFR BLD AUTO: 6 % (ref 5–12)
NEUTROPHILS NFR BLD AUTO: 2.98 10*3/MM3 (ref 1.7–7)
NEUTROPHILS NFR BLD AUTO: 57.5 % (ref 42.7–76)
NRBC BLD AUTO-RTO: 0 /100 WBC (ref 0–0.2)
PLATELET # BLD AUTO: 352 10*3/MM3 (ref 140–450)
PMV BLD AUTO: 10.2 FL (ref 6–12)
POTASSIUM SERPL-SCNC: 4.1 MMOL/L (ref 3.5–5.2)
PROT SERPL-MCNC: 7 G/DL (ref 6–8.5)
RBC # BLD AUTO: 4.22 10*6/MM3 (ref 3.77–5.28)
SODIUM SERPL-SCNC: 136 MMOL/L (ref 136–145)
T4 FREE SERPL-MCNC: 1.61 NG/DL (ref 0.93–1.7)
TSH SERPL DL<=0.05 MIU/L-ACNC: 1.75 UIU/ML (ref 0.27–4.2)
VIT B12 BLD-MCNC: 651 PG/ML (ref 211–946)
WBC NRBC COR # BLD: 5.18 10*3/MM3 (ref 3.4–10.8)

## 2023-04-13 PROCEDURE — 36415 COLL VENOUS BLD VENIPUNCTURE: CPT | Performed by: FAMILY MEDICINE

## 2023-04-13 PROCEDURE — 82746 ASSAY OF FOLIC ACID SERUM: CPT | Performed by: FAMILY MEDICINE

## 2023-04-13 PROCEDURE — 82607 VITAMIN B-12: CPT | Performed by: FAMILY MEDICINE

## 2023-04-13 PROCEDURE — 82306 VITAMIN D 25 HYDROXY: CPT | Performed by: FAMILY MEDICINE

## 2023-04-13 PROCEDURE — 80050 GENERAL HEALTH PANEL: CPT | Performed by: FAMILY MEDICINE

## 2023-04-13 PROCEDURE — 84439 ASSAY OF FREE THYROXINE: CPT | Performed by: FAMILY MEDICINE

## 2023-04-13 NOTE — PROGRESS NOTES
Venipuncture Blood Specimen Collection  Venipuncture performed in right arm  by Shelli Arvizu with good hemostasis. Patient tolerated the procedure well without complications.   04/13/23   Shelli Arvizu

## 2023-04-28 DIAGNOSIS — J45.901 MODERATE ASTHMA WITH EXACERBATION, UNSPECIFIED WHETHER PERSISTENT: ICD-10-CM

## 2023-04-28 RX ORDER — METHYLPREDNISOLONE 4 MG/1
TABLET ORAL
Qty: 21 TABLET | Refills: 0 | Status: SHIPPED | OUTPATIENT
Start: 2023-04-28

## 2023-04-28 NOTE — TELEPHONE ENCOUNTER
Tessa is asking for a medrol dose pack.  She has been sick all week and wants it sent to [facility].

## 2023-05-13 DIAGNOSIS — M79.671 PAIN OF RIGHT HEEL: Primary | ICD-10-CM

## 2023-05-13 RX ORDER — METHYLPREDNISOLONE 4 MG/1
TABLET ORAL
Qty: 21 TABLET | Refills: 0 | Status: SHIPPED | OUTPATIENT
Start: 2023-05-13

## 2023-06-05 ENCOUNTER — CLINICAL SUPPORT (OUTPATIENT)
Dept: FAMILY MEDICINE CLINIC | Facility: CLINIC | Age: 58
End: 2023-06-05
Payer: COMMERCIAL

## 2023-06-05 DIAGNOSIS — Z23 NEED FOR TDAP VACCINATION: Primary | ICD-10-CM

## 2023-07-25 DIAGNOSIS — E53.8 FOLIC ACID DEFICIENCY: ICD-10-CM

## 2023-07-25 RX ORDER — FOLIC ACID 1 MG/1
1 TABLET ORAL DAILY
Qty: 90 TABLET | Refills: 3 | Status: SHIPPED | OUTPATIENT
Start: 2023-07-25

## 2023-07-27 DIAGNOSIS — R60.9 EDEMA, UNSPECIFIED TYPE: ICD-10-CM

## 2023-07-27 RX ORDER — HYDROCHLOROTHIAZIDE 12.5 MG/1
12.5 TABLET ORAL DAILY
Qty: 90 TABLET | Refills: 1 | Status: SHIPPED | OUTPATIENT
Start: 2023-07-27

## 2023-07-31 ENCOUNTER — HOSPITAL ENCOUNTER (OUTPATIENT)
Dept: ULTRASOUND IMAGING | Facility: HOSPITAL | Age: 58
Discharge: HOME | End: 2023-07-31
Admitting: INTERNAL MEDICINE
Payer: COMMERCIAL

## 2023-07-31 DIAGNOSIS — K74.3 PRIMARY BILIARY CIRRHOSIS: ICD-10-CM

## 2023-07-31 PROCEDURE — 76981 USE PARENCHYMA: CPT

## 2023-07-31 PROCEDURE — 76705 ECHO EXAM OF ABDOMEN: CPT

## 2023-09-26 ENCOUNTER — CLINICAL SUPPORT (OUTPATIENT)
Dept: FAMILY MEDICINE CLINIC | Facility: CLINIC | Age: 58
End: 2023-09-26
Payer: COMMERCIAL

## 2023-09-26 DIAGNOSIS — E53.8 FOLIC ACID DEFICIENCY: Primary | ICD-10-CM

## 2023-09-26 DIAGNOSIS — K74.3 PRIMARY BILIARY CIRRHOSIS: ICD-10-CM

## 2023-09-26 DIAGNOSIS — E55.9 VITAMIN D DEFICIENCY: ICD-10-CM

## 2023-09-26 DIAGNOSIS — R53.83 OTHER FATIGUE: ICD-10-CM

## 2023-09-26 LAB
25(OH)D3 SERPL-MCNC: 36.9 NG/ML (ref 30–100)
ALBUMIN SERPL-MCNC: 4.4 G/DL (ref 3.5–5.2)
ALBUMIN/GLOB SERPL: 1.8 G/DL
ALP SERPL-CCNC: 197 U/L (ref 39–117)
ALT SERPL W P-5'-P-CCNC: 27 U/L (ref 1–33)
ANION GAP SERPL CALCULATED.3IONS-SCNC: 11.6 MMOL/L (ref 5–15)
AST SERPL-CCNC: 29 U/L (ref 1–32)
BASOPHILS # BLD AUTO: 0.02 10*3/MM3 (ref 0–0.2)
BASOPHILS NFR BLD AUTO: 0.3 % (ref 0–1.5)
BILIRUB SERPL-MCNC: 0.2 MG/DL (ref 0–1.2)
BUN SERPL-MCNC: 20 MG/DL (ref 8–23)
BUN/CREAT SERPL: 18 (ref 7–25)
CALCIUM SPEC-SCNC: 9.5 MG/DL (ref 8.6–10.5)
CHLORIDE SERPL-SCNC: 101 MMOL/L (ref 98–107)
CO2 SERPL-SCNC: 27.4 MMOL/L (ref 22–29)
CREAT SERPL-MCNC: 1.11 MG/DL (ref 0.57–1)
DEPRECATED RDW RBC AUTO: 44.7 FL (ref 37–54)
EGFRCR SERPLBLD CKD-EPI 2021: 57 ML/MIN/1.73
EOSINOPHIL # BLD AUTO: 0.28 10*3/MM3 (ref 0–0.4)
EOSINOPHIL NFR BLD AUTO: 4.2 % (ref 0.3–6.2)
ERYTHROCYTE [DISTWIDTH] IN BLOOD BY AUTOMATED COUNT: 13 % (ref 12.3–15.4)
FOLATE SERPL-MCNC: >20 NG/ML (ref 4.78–24.2)
GLOBULIN UR ELPH-MCNC: 2.4 GM/DL
GLUCOSE SERPL-MCNC: 96 MG/DL (ref 65–99)
HCT VFR BLD AUTO: 39.7 % (ref 34–46.6)
HGB BLD-MCNC: 13.1 G/DL (ref 12–15.9)
IMM GRANULOCYTES # BLD AUTO: 0.01 10*3/MM3 (ref 0–0.05)
IMM GRANULOCYTES NFR BLD AUTO: 0.2 % (ref 0–0.5)
LYMPHOCYTES # BLD AUTO: 1.66 10*3/MM3 (ref 0.7–3.1)
LYMPHOCYTES NFR BLD AUTO: 24.9 % (ref 19.6–45.3)
MCH RBC QN AUTO: 31.1 PG (ref 26.6–33)
MCHC RBC AUTO-ENTMCNC: 33 G/DL (ref 31.5–35.7)
MCV RBC AUTO: 94.3 FL (ref 79–97)
MONOCYTES # BLD AUTO: 0.44 10*3/MM3 (ref 0.1–0.9)
MONOCYTES NFR BLD AUTO: 6.6 % (ref 5–12)
NEUTROPHILS NFR BLD AUTO: 4.25 10*3/MM3 (ref 1.7–7)
NEUTROPHILS NFR BLD AUTO: 63.8 % (ref 42.7–76)
NRBC BLD AUTO-RTO: 0 /100 WBC (ref 0–0.2)
PLATELET # BLD AUTO: 343 10*3/MM3 (ref 140–450)
PMV BLD AUTO: 10.6 FL (ref 6–12)
POTASSIUM SERPL-SCNC: 3.9 MMOL/L (ref 3.5–5.2)
PROT SERPL-MCNC: 6.8 G/DL (ref 6–8.5)
RBC # BLD AUTO: 4.21 10*6/MM3 (ref 3.77–5.28)
SODIUM SERPL-SCNC: 140 MMOL/L (ref 136–145)
T4 FREE SERPL-MCNC: 1.51 NG/DL (ref 0.93–1.7)
TSH SERPL DL<=0.05 MIU/L-ACNC: 2.29 UIU/ML (ref 0.27–4.2)
VIT B12 BLD-MCNC: 819 PG/ML (ref 211–946)
WBC NRBC COR # BLD: 6.66 10*3/MM3 (ref 3.4–10.8)

## 2023-09-26 PROCEDURE — 80050 GENERAL HEALTH PANEL: CPT | Performed by: FAMILY MEDICINE

## 2023-09-26 PROCEDURE — 86664 EPSTEIN-BARR NUCLEAR ANTIGEN: CPT | Performed by: FAMILY MEDICINE

## 2023-09-26 PROCEDURE — 82607 VITAMIN B-12: CPT | Performed by: FAMILY MEDICINE

## 2023-09-26 PROCEDURE — 82746 ASSAY OF FOLIC ACID SERUM: CPT | Performed by: FAMILY MEDICINE

## 2023-09-26 PROCEDURE — 86665 EPSTEIN-BARR CAPSID VCA: CPT | Performed by: FAMILY MEDICINE

## 2023-09-26 PROCEDURE — 86663 EPSTEIN-BARR ANTIBODY: CPT | Performed by: FAMILY MEDICINE

## 2023-09-26 PROCEDURE — 82306 VITAMIN D 25 HYDROXY: CPT | Performed by: FAMILY MEDICINE

## 2023-09-26 PROCEDURE — 84439 ASSAY OF FREE THYROXINE: CPT | Performed by: FAMILY MEDICINE

## 2023-09-26 NOTE — PROGRESS NOTES
Venipuncture Blood Specimen Collection  Venipuncture performed in left arm by Shelli Arvizu with good hemostasis. Patient tolerated the procedure well without complications.   09/26/23   Zeyad Funk

## 2023-09-27 LAB
EBV EA-D IGG SER-ACNC: 35 U/ML (ref 0–8.9)
EBV NA IGG SER IA-ACNC: >600 U/ML (ref 0–17.9)
EBV VCA IGG SER IA-ACNC: 560 U/ML (ref 0–17.9)
EBV VCA IGM SER IA-ACNC: <36 U/ML (ref 0–35.9)

## 2023-11-09 ENCOUNTER — CLINICAL SUPPORT (OUTPATIENT)
Dept: FAMILY MEDICINE CLINIC | Facility: CLINIC | Age: 58
End: 2023-11-09
Payer: COMMERCIAL

## 2023-11-09 DIAGNOSIS — Z23 NEED FOR INFLUENZA VACCINATION: Primary | ICD-10-CM

## 2023-11-09 DIAGNOSIS — K74.3 PRIMARY BILIARY CIRRHOSIS: Primary | ICD-10-CM

## 2023-11-09 DIAGNOSIS — K74.3 PRIMARY BILIARY CIRRHOSIS: ICD-10-CM

## 2023-11-09 LAB
ALBUMIN SERPL-MCNC: 4.6 G/DL (ref 3.5–5.2)
ALBUMIN/GLOB SERPL: 1.6 G/DL
ALP SERPL-CCNC: 201 U/L (ref 39–117)
ALT SERPL W P-5'-P-CCNC: 27 U/L (ref 1–33)
ANION GAP SERPL CALCULATED.3IONS-SCNC: 10.2 MMOL/L (ref 5–15)
AST SERPL-CCNC: 29 U/L (ref 1–32)
BILIRUB SERPL-MCNC: 0.2 MG/DL (ref 0–1.2)
BUN SERPL-MCNC: 17 MG/DL (ref 8–23)
BUN/CREAT SERPL: 19.3 (ref 7–25)
CALCIUM SPEC-SCNC: 9.8 MG/DL (ref 8.6–10.5)
CHLORIDE SERPL-SCNC: 100 MMOL/L (ref 98–107)
CO2 SERPL-SCNC: 28.8 MMOL/L (ref 22–29)
CREAT SERPL-MCNC: 0.88 MG/DL (ref 0.57–1)
EGFRCR SERPLBLD CKD-EPI 2021: 75.3 ML/MIN/1.73
GLOBULIN UR ELPH-MCNC: 2.9 GM/DL
GLUCOSE SERPL-MCNC: 99 MG/DL (ref 65–99)
POTASSIUM SERPL-SCNC: 4 MMOL/L (ref 3.5–5.2)
PROT SERPL-MCNC: 7.5 G/DL (ref 6–8.5)
SODIUM SERPL-SCNC: 139 MMOL/L (ref 136–145)

## 2023-11-09 PROCEDURE — 36415 COLL VENOUS BLD VENIPUNCTURE: CPT | Performed by: NURSE PRACTITIONER

## 2023-11-09 PROCEDURE — 80053 COMPREHEN METABOLIC PANEL: CPT | Performed by: NURSE PRACTITIONER

## 2023-11-09 NOTE — PROGRESS NOTES
..  Venipuncture Blood Specimen Collection  Venipuncture performed in LT arm by Marine Mcdonnell MA with good hemostasis. Patient tolerated the procedure well without complications.   11/09/23   Marine Mcdonnell MA

## 2023-12-06 ENCOUNTER — HOSPITAL ENCOUNTER (OUTPATIENT)
Dept: ULTRASOUND IMAGING | Facility: HOSPITAL | Age: 58
Discharge: HOME | End: 2023-12-06
Admitting: OTOLARYNGOLOGY
Payer: COMMERCIAL

## 2023-12-06 DIAGNOSIS — E04.1 THYROID NODULE: ICD-10-CM

## 2023-12-06 PROCEDURE — 76536 US EXAM OF HEAD AND NECK: CPT

## 2023-12-20 ENCOUNTER — CLINICAL SUPPORT (OUTPATIENT)
Dept: FAMILY MEDICINE CLINIC | Facility: CLINIC | Age: 58
End: 2023-12-20
Payer: COMMERCIAL

## 2023-12-20 DIAGNOSIS — K74.3 PRIMARY BILIARY CIRRHOSIS: ICD-10-CM

## 2023-12-20 DIAGNOSIS — R53.83 OTHER FATIGUE: ICD-10-CM

## 2023-12-20 DIAGNOSIS — E53.8 FOLIC ACID DEFICIENCY: ICD-10-CM

## 2023-12-20 DIAGNOSIS — E55.9 VITAMIN D DEFICIENCY: ICD-10-CM

## 2023-12-20 DIAGNOSIS — J10.1 INFLUENZA B: Primary | ICD-10-CM

## 2023-12-20 DIAGNOSIS — K74.3 PRIMARY BILIARY CIRRHOSIS: Primary | ICD-10-CM

## 2023-12-20 LAB
25(OH)D3 SERPL-MCNC: 35.7 NG/ML (ref 30–100)
ALBUMIN SERPL-MCNC: 4.9 G/DL (ref 3.5–5.2)
ALBUMIN/GLOB SERPL: 1.8 G/DL
ALP SERPL-CCNC: 208 U/L (ref 39–117)
ALT SERPL W P-5'-P-CCNC: 30 U/L (ref 1–33)
ANION GAP SERPL CALCULATED.3IONS-SCNC: 12 MMOL/L (ref 5–15)
AST SERPL-CCNC: 31 U/L (ref 1–32)
BASOPHILS # BLD AUTO: 0.03 10*3/MM3 (ref 0–0.2)
BASOPHILS NFR BLD AUTO: 0.4 % (ref 0–1.5)
BILIRUB SERPL-MCNC: 0.2 MG/DL (ref 0–1.2)
BUN SERPL-MCNC: 18 MG/DL (ref 8–23)
BUN/CREAT SERPL: 17.6 (ref 7–25)
CALCIUM SPEC-SCNC: 10.2 MG/DL (ref 8.6–10.5)
CHLORIDE SERPL-SCNC: 100 MMOL/L (ref 98–107)
CO2 SERPL-SCNC: 28 MMOL/L (ref 22–29)
CREAT SERPL-MCNC: 1.02 MG/DL (ref 0.57–1)
DEPRECATED RDW RBC AUTO: 45.3 FL (ref 37–54)
EGFRCR SERPLBLD CKD-EPI 2021: 63.1 ML/MIN/1.73
EOSINOPHIL # BLD AUTO: 0.32 10*3/MM3 (ref 0–0.4)
EOSINOPHIL NFR BLD AUTO: 4.5 % (ref 0.3–6.2)
ERYTHROCYTE [DISTWIDTH] IN BLOOD BY AUTOMATED COUNT: 13.3 % (ref 12.3–15.4)
EXPIRATION DATE: ABNORMAL
FLUAV AG UPPER RESP QL IA.RAPID: NOT DETECTED
FLUBV AG UPPER RESP QL IA.RAPID: DETECTED
FOLATE SERPL-MCNC: >20 NG/ML (ref 4.78–24.2)
GLOBULIN UR ELPH-MCNC: 2.7 GM/DL
GLUCOSE SERPL-MCNC: 103 MG/DL (ref 65–99)
HCT VFR BLD AUTO: 40.3 % (ref 34–46.6)
HGB BLD-MCNC: 13.6 G/DL (ref 12–15.9)
IMM GRANULOCYTES # BLD AUTO: 0.02 10*3/MM3 (ref 0–0.05)
IMM GRANULOCYTES NFR BLD AUTO: 0.3 % (ref 0–0.5)
INTERNAL CONTROL: ABNORMAL
LYMPHOCYTES # BLD AUTO: 1.91 10*3/MM3 (ref 0.7–3.1)
LYMPHOCYTES NFR BLD AUTO: 27.1 % (ref 19.6–45.3)
Lab: ABNORMAL
MCH RBC QN AUTO: 31.3 PG (ref 26.6–33)
MCHC RBC AUTO-ENTMCNC: 33.7 G/DL (ref 31.5–35.7)
MCV RBC AUTO: 92.6 FL (ref 79–97)
MONOCYTES # BLD AUTO: 0.38 10*3/MM3 (ref 0.1–0.9)
MONOCYTES NFR BLD AUTO: 5.4 % (ref 5–12)
NEUTROPHILS NFR BLD AUTO: 4.39 10*3/MM3 (ref 1.7–7)
NEUTROPHILS NFR BLD AUTO: 62.3 % (ref 42.7–76)
NRBC BLD AUTO-RTO: 0 /100 WBC (ref 0–0.2)
PLATELET # BLD AUTO: 346 10*3/MM3 (ref 140–450)
PMV BLD AUTO: 10.8 FL (ref 6–12)
POTASSIUM SERPL-SCNC: 3.9 MMOL/L (ref 3.5–5.2)
PROT SERPL-MCNC: 7.6 G/DL (ref 6–8.5)
RBC # BLD AUTO: 4.35 10*6/MM3 (ref 3.77–5.28)
SARS-COV-2 AG UPPER RESP QL IA.RAPID: NOT DETECTED
SODIUM SERPL-SCNC: 140 MMOL/L (ref 136–145)
TSH SERPL DL<=0.05 MIU/L-ACNC: 1.8 UIU/ML (ref 0.27–4.2)
VIT B12 BLD-MCNC: 891 PG/ML (ref 211–946)
WBC NRBC COR # BLD AUTO: 7.05 10*3/MM3 (ref 3.4–10.8)

## 2023-12-20 PROCEDURE — 82607 VITAMIN B-12: CPT | Performed by: NURSE PRACTITIONER

## 2023-12-20 PROCEDURE — 36415 COLL VENOUS BLD VENIPUNCTURE: CPT | Performed by: NURSE PRACTITIONER

## 2023-12-20 PROCEDURE — 80050 GENERAL HEALTH PANEL: CPT | Performed by: NURSE PRACTITIONER

## 2023-12-20 PROCEDURE — 82306 VITAMIN D 25 HYDROXY: CPT | Performed by: NURSE PRACTITIONER

## 2023-12-20 PROCEDURE — 82746 ASSAY OF FOLIC ACID SERUM: CPT | Performed by: NURSE PRACTITIONER

## 2023-12-20 RX ORDER — OSELTAMIVIR PHOSPHATE 75 MG/1
75 CAPSULE ORAL 2 TIMES DAILY
Qty: 10 CAPSULE | Refills: 0 | Status: SHIPPED | OUTPATIENT
Start: 2023-12-20 | End: 2023-12-25

## 2023-12-20 NOTE — PROGRESS NOTES
Venipuncture Blood Specimen Collection  Venipuncture performed in left arm  by Shelli Arvizu with good hemostasis. Patient tolerated the procedure well without complications.   12/20/23   Shelli Arvizu

## 2024-01-25 DIAGNOSIS — R60.9 EDEMA, UNSPECIFIED TYPE: ICD-10-CM

## 2024-01-25 RX ORDER — HYDROCHLOROTHIAZIDE 12.5 MG/1
12.5 TABLET ORAL DAILY
Qty: 90 TABLET | Refills: 1 | Status: SHIPPED | OUTPATIENT
Start: 2024-01-25

## 2024-02-07 ENCOUNTER — CLINICAL SUPPORT (OUTPATIENT)
Dept: FAMILY MEDICINE CLINIC | Facility: CLINIC | Age: 59
End: 2024-02-07
Payer: COMMERCIAL

## 2024-02-07 DIAGNOSIS — R51.9 NONINTRACTABLE HEADACHE, UNSPECIFIED CHRONICITY PATTERN, UNSPECIFIED HEADACHE TYPE: Primary | ICD-10-CM

## 2024-02-07 DIAGNOSIS — R03.0 ELEVATED BLOOD PRESSURE READING: Primary | ICD-10-CM

## 2024-02-07 LAB
FLUAV SUBTYP SPEC NAA+PROBE: NOT DETECTED
FLUBV RNA ISLT QL NAA+PROBE: NOT DETECTED
RSV RNA NPH QL NAA+NON-PROBE: NOT DETECTED
SARS-COV-2 RNA RESP QL NAA+PROBE: NOT DETECTED

## 2024-02-07 PROCEDURE — 87637 SARSCOV2&INF A&B&RSV AMP PRB: CPT | Performed by: NURSE PRACTITIONER

## 2024-02-08 DIAGNOSIS — K74.3 PRIMARY BILIARY CIRRHOSIS: Primary | ICD-10-CM

## 2024-02-08 LAB
ALBUMIN SERPL-MCNC: 4.6 G/DL (ref 3.5–5.2)
ALBUMIN/GLOB SERPL: 1.6 G/DL
ALP SERPL-CCNC: 200 U/L (ref 39–117)
ALT SERPL W P-5'-P-CCNC: 37 U/L (ref 1–33)
ANION GAP SERPL CALCULATED.3IONS-SCNC: 10.8 MMOL/L (ref 5–15)
AST SERPL-CCNC: 39 U/L (ref 1–32)
BILIRUB SERPL-MCNC: 0.2 MG/DL (ref 0–1.2)
BUN SERPL-MCNC: 23 MG/DL (ref 8–23)
BUN/CREAT SERPL: 17.2 (ref 7–25)
CALCIUM SPEC-SCNC: 9.9 MG/DL (ref 8.6–10.5)
CHLORIDE SERPL-SCNC: 100 MMOL/L (ref 98–107)
CO2 SERPL-SCNC: 27.2 MMOL/L (ref 22–29)
CREAT SERPL-MCNC: 1.34 MG/DL (ref 0.57–1)
EGFRCR SERPLBLD CKD-EPI 2021: 45.5 ML/MIN/1.73
GLOBULIN UR ELPH-MCNC: 2.8 GM/DL
GLUCOSE SERPL-MCNC: 90 MG/DL (ref 65–99)
POTASSIUM SERPL-SCNC: 4.1 MMOL/L (ref 3.5–5.2)
PROT SERPL-MCNC: 7.4 G/DL (ref 6–8.5)
SODIUM SERPL-SCNC: 138 MMOL/L (ref 136–145)

## 2024-02-08 PROCEDURE — 80053 COMPREHEN METABOLIC PANEL: CPT | Performed by: FAMILY MEDICINE

## 2024-02-09 NOTE — PROGRESS NOTES
Labs show elevated alk phos and decreased kidney function.  Drink more water.  Recheck in 1-2 weeks.

## 2024-02-22 ENCOUNTER — CLINICAL SUPPORT (OUTPATIENT)
Dept: FAMILY MEDICINE CLINIC | Facility: CLINIC | Age: 59
End: 2024-02-22
Payer: COMMERCIAL

## 2024-02-22 DIAGNOSIS — K74.3 PRIMARY BILIARY CIRRHOSIS: Primary | ICD-10-CM

## 2024-02-22 DIAGNOSIS — N28.9 ABNORMAL KIDNEY FUNCTION: Primary | ICD-10-CM

## 2024-02-22 DIAGNOSIS — N28.9 RENAL INSUFFICIENCY: ICD-10-CM

## 2024-02-22 LAB
ALBUMIN SERPL-MCNC: 4.8 G/DL (ref 3.5–5.2)
ALBUMIN/GLOB SERPL: 1.9 G/DL
ALP SERPL-CCNC: 201 U/L (ref 39–117)
ALT SERPL W P-5'-P-CCNC: 31 U/L (ref 1–33)
ANION GAP SERPL CALCULATED.3IONS-SCNC: 12 MMOL/L (ref 5–15)
AST SERPL-CCNC: 30 U/L (ref 1–32)
BACTERIA UR QL AUTO: ABNORMAL /HPF
BILIRUB SERPL-MCNC: 0.3 MG/DL (ref 0–1.2)
BILIRUB UR QL STRIP: NEGATIVE
BUN SERPL-MCNC: 16 MG/DL (ref 8–23)
BUN/CREAT SERPL: 16.7 (ref 7–25)
CALCIUM SPEC-SCNC: 9.9 MG/DL (ref 8.6–10.5)
CHLORIDE SERPL-SCNC: 99 MMOL/L (ref 98–107)
CLARITY UR: CLEAR
CO2 SERPL-SCNC: 28 MMOL/L (ref 22–29)
COLOR UR: ABNORMAL
CREAT SERPL-MCNC: 0.96 MG/DL (ref 0.57–1)
EGFRCR SERPLBLD CKD-EPI 2021: 67.9 ML/MIN/1.73
GLOBULIN UR ELPH-MCNC: 2.5 GM/DL
GLUCOSE SERPL-MCNC: 92 MG/DL (ref 65–99)
GLUCOSE UR STRIP-MCNC: NEGATIVE MG/DL
HGB UR QL STRIP.AUTO: NEGATIVE
HYALINE CASTS UR QL AUTO: ABNORMAL /LPF
KETONES UR QL STRIP: ABNORMAL
LEUKOCYTE ESTERASE UR QL STRIP.AUTO: ABNORMAL
NITRITE UR QL STRIP: NEGATIVE
PH UR STRIP.AUTO: 7.5 [PH] (ref 5–8)
POTASSIUM SERPL-SCNC: 4.2 MMOL/L (ref 3.5–5.2)
PROT SERPL-MCNC: 7.3 G/DL (ref 6–8.5)
PROT UR QL STRIP: NEGATIVE
RBC # UR STRIP: ABNORMAL /HPF
REF LAB TEST METHOD: ABNORMAL
SODIUM SERPL-SCNC: 139 MMOL/L (ref 136–145)
SP GR UR STRIP: 1.02 (ref 1–1.03)
SQUAMOUS #/AREA URNS HPF: ABNORMAL /HPF
UROBILINOGEN UR QL STRIP: ABNORMAL
WBC # UR STRIP: ABNORMAL /HPF

## 2024-02-22 PROCEDURE — 81001 URINALYSIS AUTO W/SCOPE: CPT | Performed by: FAMILY MEDICINE

## 2024-02-22 PROCEDURE — 80053 COMPREHEN METABOLIC PANEL: CPT | Performed by: FAMILY MEDICINE

## 2024-02-22 PROCEDURE — 36415 COLL VENOUS BLD VENIPUNCTURE: CPT | Performed by: FAMILY MEDICINE

## 2024-02-22 NOTE — PROGRESS NOTES
..  Venipuncture Blood Specimen Collection  Venipuncture performed in RT arm by Marine Mcdonnell MA with good hemostasis. Patient tolerated the procedure well without complications.   02/22/24   Marine Mcdonnell MA

## 2024-03-04 ENCOUNTER — TRANSCRIBE ORDERS (OUTPATIENT)
Dept: ADMINISTRATIVE | Facility: HOSPITAL | Age: 59
End: 2024-03-04
Payer: COMMERCIAL

## 2024-03-04 DIAGNOSIS — Z12.31 VISIT FOR SCREENING MAMMOGRAM: Primary | ICD-10-CM

## 2024-03-20 ENCOUNTER — HOSPITAL ENCOUNTER (OUTPATIENT)
Dept: MAMMOGRAPHY | Facility: HOSPITAL | Age: 59
Discharge: HOME | End: 2024-03-20
Admitting: FAMILY MEDICINE
Payer: COMMERCIAL

## 2024-03-20 DIAGNOSIS — Z12.31 VISIT FOR SCREENING MAMMOGRAM: ICD-10-CM

## 2024-03-20 PROCEDURE — 77067 SCR MAMMO BI INCL CAD: CPT

## 2024-03-20 PROCEDURE — 77063 BREAST TOMOSYNTHESIS BI: CPT

## 2024-04-11 ENCOUNTER — CLINICAL SUPPORT (OUTPATIENT)
Dept: FAMILY MEDICINE CLINIC | Facility: CLINIC | Age: 59
End: 2024-04-11
Payer: COMMERCIAL

## 2024-04-11 DIAGNOSIS — K21.9 GASTROESOPHAGEAL REFLUX DISEASE, UNSPECIFIED WHETHER ESOPHAGITIS PRESENT: Primary | ICD-10-CM

## 2024-04-11 DIAGNOSIS — N28.9 ABNORMAL KIDNEY FUNCTION: ICD-10-CM

## 2024-04-11 DIAGNOSIS — R60.9 EDEMA, UNSPECIFIED TYPE: ICD-10-CM

## 2024-04-11 DIAGNOSIS — N28.9 RENAL INSUFFICIENCY: ICD-10-CM

## 2024-04-11 PROCEDURE — 80053 COMPREHEN METABOLIC PANEL: CPT | Performed by: FAMILY MEDICINE

## 2024-04-11 PROCEDURE — 36415 COLL VENOUS BLD VENIPUNCTURE: CPT | Performed by: FAMILY MEDICINE

## 2024-04-11 NOTE — PROGRESS NOTES
Venipuncture Blood Specimen Collection  Venipuncture performed in left arm  by Shelli Arvizu with good hemostasis. Patient tolerated the procedure well without complications.   04/11/24   Shelli Arvizu

## 2024-04-12 LAB
ALBUMIN SERPL-MCNC: 4.4 G/DL (ref 3.5–5.2)
ALBUMIN/GLOB SERPL: 1.7 G/DL
ALP SERPL-CCNC: 179 U/L (ref 39–117)
ALT SERPL W P-5'-P-CCNC: 27 U/L (ref 1–33)
ANION GAP SERPL CALCULATED.3IONS-SCNC: 11.9 MMOL/L (ref 5–15)
AST SERPL-CCNC: 28 U/L (ref 1–32)
BILIRUB SERPL-MCNC: 0.2 MG/DL (ref 0–1.2)
BUN SERPL-MCNC: 15 MG/DL (ref 8–23)
BUN/CREAT SERPL: 16.1 (ref 7–25)
CALCIUM SPEC-SCNC: 9.7 MG/DL (ref 8.6–10.5)
CHLORIDE SERPL-SCNC: 101 MMOL/L (ref 98–107)
CO2 SERPL-SCNC: 26.1 MMOL/L (ref 22–29)
CREAT SERPL-MCNC: 0.93 MG/DL (ref 0.57–1)
EGFRCR SERPLBLD CKD-EPI 2021: 70.5 ML/MIN/1.73
GLOBULIN UR ELPH-MCNC: 2.6 GM/DL
GLUCOSE SERPL-MCNC: 89 MG/DL (ref 65–99)
POTASSIUM SERPL-SCNC: 4.4 MMOL/L (ref 3.5–5.2)
PROT SERPL-MCNC: 7 G/DL (ref 6–8.5)
SODIUM SERPL-SCNC: 139 MMOL/L (ref 136–145)

## 2024-05-13 ENCOUNTER — CLINICAL SUPPORT (OUTPATIENT)
Dept: FAMILY MEDICINE CLINIC | Facility: CLINIC | Age: 59
End: 2024-05-13
Payer: COMMERCIAL

## 2024-05-13 DIAGNOSIS — K74.3 PRIMARY BILIARY CIRRHOSIS: Primary | ICD-10-CM

## 2024-05-13 DIAGNOSIS — R49.0 VOICE HOARSENESS: Primary | ICD-10-CM

## 2024-05-13 PROCEDURE — 36415 COLL VENOUS BLD VENIPUNCTURE: CPT | Performed by: FAMILY MEDICINE

## 2024-05-13 PROCEDURE — 80053 COMPREHEN METABOLIC PANEL: CPT | Performed by: FAMILY MEDICINE

## 2024-05-13 NOTE — PROGRESS NOTES
Venipuncture Blood Specimen Collection  Venipuncture performed in left arm by Shelli Arvizu with good hemostasis. Patient tolerated the procedure well without complications.   05/13/24   Shelli Arvizu

## 2024-05-14 LAB
ALBUMIN SERPL-MCNC: 4.4 G/DL (ref 3.5–5.2)
ALBUMIN/GLOB SERPL: 1.7 G/DL
ALP SERPL-CCNC: 147 U/L (ref 39–117)
ALT SERPL W P-5'-P-CCNC: 22 U/L (ref 1–33)
ANION GAP SERPL CALCULATED.3IONS-SCNC: 12.2 MMOL/L (ref 5–15)
AST SERPL-CCNC: 25 U/L (ref 1–32)
BILIRUB SERPL-MCNC: 0.2 MG/DL (ref 0–1.2)
BUN SERPL-MCNC: 18 MG/DL (ref 8–23)
BUN/CREAT SERPL: 18.6 (ref 7–25)
CALCIUM SPEC-SCNC: 9.5 MG/DL (ref 8.6–10.5)
CHLORIDE SERPL-SCNC: 100 MMOL/L (ref 98–107)
CO2 SERPL-SCNC: 23.8 MMOL/L (ref 22–29)
CREAT SERPL-MCNC: 0.97 MG/DL (ref 0.57–1)
EGFRCR SERPLBLD CKD-EPI 2021: 67 ML/MIN/1.73
GLOBULIN UR ELPH-MCNC: 2.6 GM/DL
GLUCOSE SERPL-MCNC: 71 MG/DL (ref 65–99)
POTASSIUM SERPL-SCNC: 4.4 MMOL/L (ref 3.5–5.2)
PROT SERPL-MCNC: 7 G/DL (ref 6–8.5)
SODIUM SERPL-SCNC: 136 MMOL/L (ref 136–145)

## 2024-06-06 ENCOUNTER — CLINICAL SUPPORT (OUTPATIENT)
Dept: FAMILY MEDICINE CLINIC | Facility: CLINIC | Age: 59
End: 2024-06-06
Payer: COMMERCIAL

## 2024-06-06 DIAGNOSIS — K74.3 PRIMARY BILIARY CIRRHOSIS: ICD-10-CM

## 2024-06-06 DIAGNOSIS — N28.9 RENAL INSUFFICIENCY: ICD-10-CM

## 2024-06-06 DIAGNOSIS — K74.3 PRIMARY BILIARY CIRRHOSIS: Primary | ICD-10-CM

## 2024-06-06 LAB
ALBUMIN SERPL-MCNC: 4.3 G/DL (ref 3.5–5.2)
ALBUMIN/GLOB SERPL: 1.8 G/DL
ALP SERPL-CCNC: 143 U/L (ref 39–117)
ALT SERPL W P-5'-P-CCNC: 23 U/L (ref 1–33)
ANION GAP SERPL CALCULATED.3IONS-SCNC: 8 MMOL/L (ref 5–15)
AST SERPL-CCNC: 23 U/L (ref 1–32)
BILIRUB SERPL-MCNC: 0.3 MG/DL (ref 0–1.2)
BUN SERPL-MCNC: 13 MG/DL (ref 8–23)
BUN/CREAT SERPL: 13.5 (ref 7–25)
CALCIUM SPEC-SCNC: 9.2 MG/DL (ref 8.6–10.5)
CHLORIDE SERPL-SCNC: 102 MMOL/L (ref 98–107)
CO2 SERPL-SCNC: 27 MMOL/L (ref 22–29)
CREAT SERPL-MCNC: 0.96 MG/DL (ref 0.57–1)
EGFRCR SERPLBLD CKD-EPI 2021: 67.9 ML/MIN/1.73
GLOBULIN UR ELPH-MCNC: 2.4 GM/DL
GLUCOSE SERPL-MCNC: 75 MG/DL (ref 65–99)
POTASSIUM SERPL-SCNC: 3.6 MMOL/L (ref 3.5–5.2)
PROT SERPL-MCNC: 6.7 G/DL (ref 6–8.5)
SODIUM SERPL-SCNC: 137 MMOL/L (ref 136–145)

## 2024-06-06 PROCEDURE — 80053 COMPREHEN METABOLIC PANEL: CPT | Performed by: NURSE PRACTITIONER

## 2024-06-06 PROCEDURE — 36415 COLL VENOUS BLD VENIPUNCTURE: CPT | Performed by: NURSE PRACTITIONER

## 2024-06-06 NOTE — PROGRESS NOTES
Venipuncture Blood Specimen Collection  Venipuncture performed in right arm by Shelli Arvizu with good hemostasis. Patient tolerated the procedure well without complications.   06/06/24   Shelli Arvizu

## 2024-06-14 ENCOUNTER — OFFICE VISIT (OUTPATIENT)
Dept: FAMILY MEDICINE CLINIC | Facility: CLINIC | Age: 59
End: 2024-06-14
Payer: COMMERCIAL

## 2024-06-14 VITALS
SYSTOLIC BLOOD PRESSURE: 120 MMHG | DIASTOLIC BLOOD PRESSURE: 72 MMHG | HEIGHT: 63 IN | TEMPERATURE: 97.3 F | HEART RATE: 71 BPM | WEIGHT: 172.8 LBS | OXYGEN SATURATION: 96 % | BODY MASS INDEX: 30.62 KG/M2

## 2024-06-14 DIAGNOSIS — N60.01 BREAST CYST, RIGHT: ICD-10-CM

## 2024-06-14 DIAGNOSIS — Z11.59 NEED FOR HEPATITIS C SCREENING TEST: ICD-10-CM

## 2024-06-14 DIAGNOSIS — Z78.0 POSTMENOPAUSAL: ICD-10-CM

## 2024-06-14 DIAGNOSIS — M85.80 OSTEOPENIA DETERMINED BY X-RAY: ICD-10-CM

## 2024-06-14 DIAGNOSIS — E53.8 B12 DEFICIENCY: ICD-10-CM

## 2024-06-14 DIAGNOSIS — Z00.00 ENCOUNTER FOR ANNUAL PHYSICAL EXAM: Primary | ICD-10-CM

## 2024-06-14 DIAGNOSIS — Z71.85 VACCINE COUNSELING: ICD-10-CM

## 2024-06-14 DIAGNOSIS — R11.0 NAUSEA: ICD-10-CM

## 2024-06-14 DIAGNOSIS — Z23 NEED FOR PNEUMOCOCCAL 20-VALENT CONJUGATE VACCINATION: ICD-10-CM

## 2024-06-14 LAB
25(OH)D3 SERPL-MCNC: 44.3 NG/ML (ref 30–100)
ALBUMIN SERPL-MCNC: 4.4 G/DL (ref 3.5–5.2)
ALBUMIN/GLOB SERPL: 1.7 G/DL
ALP SERPL-CCNC: 145 U/L (ref 39–117)
ALT SERPL W P-5'-P-CCNC: 24 U/L (ref 1–33)
ANION GAP SERPL CALCULATED.3IONS-SCNC: 10.6 MMOL/L (ref 5–15)
AST SERPL-CCNC: 22 U/L (ref 1–32)
BASOPHILS # BLD AUTO: 0.02 10*3/MM3 (ref 0–0.2)
BASOPHILS NFR BLD AUTO: 0.4 % (ref 0–1.5)
BILIRUB SERPL-MCNC: 0.3 MG/DL (ref 0–1.2)
BUN SERPL-MCNC: 11 MG/DL (ref 8–23)
BUN/CREAT SERPL: 10.3 (ref 7–25)
CALCIUM SPEC-SCNC: 9.5 MG/DL (ref 8.6–10.5)
CHLORIDE SERPL-SCNC: 101 MMOL/L (ref 98–107)
CHOLEST SERPL-MCNC: 198 MG/DL (ref 0–200)
CO2 SERPL-SCNC: 25.4 MMOL/L (ref 22–29)
CREAT SERPL-MCNC: 1.07 MG/DL (ref 0.57–1)
DEPRECATED RDW RBC AUTO: 48.5 FL (ref 37–54)
EGFRCR SERPLBLD CKD-EPI 2021: 59.6 ML/MIN/1.73
EOSINOPHIL # BLD AUTO: 0.28 10*3/MM3 (ref 0–0.4)
EOSINOPHIL NFR BLD AUTO: 5.7 % (ref 0.3–6.2)
ERYTHROCYTE [DISTWIDTH] IN BLOOD BY AUTOMATED COUNT: 14.4 % (ref 12.3–15.4)
FOLATE SERPL-MCNC: >20 NG/ML (ref 4.78–24.2)
GLOBULIN UR ELPH-MCNC: 2.6 GM/DL
GLUCOSE SERPL-MCNC: 89 MG/DL (ref 65–99)
HCT VFR BLD AUTO: 40.2 % (ref 34–46.6)
HCV AB SER QL: NORMAL
HDLC SERPL-MCNC: 55 MG/DL (ref 40–60)
HGB BLD-MCNC: 13.3 G/DL (ref 12–15.9)
IMM GRANULOCYTES # BLD AUTO: 0.01 10*3/MM3 (ref 0–0.05)
IMM GRANULOCYTES NFR BLD AUTO: 0.2 % (ref 0–0.5)
LDLC SERPL CALC-MCNC: 127 MG/DL (ref 0–100)
LDLC/HDLC SERPL: 2.28 {RATIO}
LYMPHOCYTES # BLD AUTO: 1.4 10*3/MM3 (ref 0.7–3.1)
LYMPHOCYTES NFR BLD AUTO: 28.3 % (ref 19.6–45.3)
MAGNESIUM SERPL-MCNC: 2.3 MG/DL (ref 1.6–2.4)
MCH RBC QN AUTO: 31.1 PG (ref 26.6–33)
MCHC RBC AUTO-ENTMCNC: 33.1 G/DL (ref 31.5–35.7)
MCV RBC AUTO: 94.1 FL (ref 79–97)
MONOCYTES # BLD AUTO: 0.34 10*3/MM3 (ref 0.1–0.9)
MONOCYTES NFR BLD AUTO: 6.9 % (ref 5–12)
NEUTROPHILS NFR BLD AUTO: 2.9 10*3/MM3 (ref 1.7–7)
NEUTROPHILS NFR BLD AUTO: 58.5 % (ref 42.7–76)
NRBC BLD AUTO-RTO: 0 /100 WBC (ref 0–0.2)
PHOSPHATE SERPL-MCNC: 3.1 MG/DL (ref 2.5–4.5)
PLATELET # BLD AUTO: 303 10*3/MM3 (ref 140–450)
PMV BLD AUTO: 11.4 FL (ref 6–12)
POTASSIUM SERPL-SCNC: 4.6 MMOL/L (ref 3.5–5.2)
PROT SERPL-MCNC: 7 G/DL (ref 6–8.5)
RBC # BLD AUTO: 4.27 10*6/MM3 (ref 3.77–5.28)
SODIUM SERPL-SCNC: 137 MMOL/L (ref 136–145)
T4 FREE SERPL-MCNC: 1.59 NG/DL (ref 0.92–1.68)
TRIGL SERPL-MCNC: 87 MG/DL (ref 0–150)
TSH SERPL DL<=0.05 MIU/L-ACNC: 1.59 UIU/ML (ref 0.27–4.2)
VIT B12 BLD-MCNC: 1381 PG/ML (ref 211–946)
VLDLC SERPL-MCNC: 16 MG/DL (ref 5–40)
WBC NRBC COR # BLD AUTO: 4.95 10*3/MM3 (ref 3.4–10.8)

## 2024-06-14 PROCEDURE — 82306 VITAMIN D 25 HYDROXY: CPT | Performed by: NURSE PRACTITIONER

## 2024-06-14 PROCEDURE — 84100 ASSAY OF PHOSPHORUS: CPT | Performed by: NURSE PRACTITIONER

## 2024-06-14 PROCEDURE — 83735 ASSAY OF MAGNESIUM: CPT | Performed by: NURSE PRACTITIONER

## 2024-06-14 PROCEDURE — 80061 LIPID PANEL: CPT | Performed by: NURSE PRACTITIONER

## 2024-06-14 PROCEDURE — 86803 HEPATITIS C AB TEST: CPT | Performed by: NURSE PRACTITIONER

## 2024-06-14 PROCEDURE — 82607 VITAMIN B-12: CPT | Performed by: NURSE PRACTITIONER

## 2024-06-14 PROCEDURE — 84439 ASSAY OF FREE THYROXINE: CPT | Performed by: NURSE PRACTITIONER

## 2024-06-14 PROCEDURE — 80050 GENERAL HEALTH PANEL: CPT | Performed by: NURSE PRACTITIONER

## 2024-06-14 PROCEDURE — 82746 ASSAY OF FOLIC ACID SERUM: CPT | Performed by: NURSE PRACTITIONER

## 2024-06-14 RX ORDER — SULFAMETHOXAZOLE AND TRIMETHOPRIM 800; 160 MG/1; MG/1
1 TABLET ORAL AS NEEDED
COMMUNITY

## 2024-06-14 RX ORDER — ONDANSETRON 4 MG/1
4 TABLET, FILM COATED ORAL EVERY 8 HOURS PRN
Qty: 30 TABLET | Refills: 0 | Status: SHIPPED | OUTPATIENT
Start: 2024-06-14

## 2024-06-14 NOTE — PROGRESS NOTES
..  Venipuncture Blood Specimen Collection  Venipuncture performed in Lt arm by Marine Mcdonnell MA with good hemostasis. Patient tolerated the procedure well without complications.   06/14/24   Marine Mcdonnell MA

## 2024-06-14 NOTE — PROGRESS NOTES
Chief Complaint  Annual Exam    History of Present Illness  Josette Carcamo is a 58 y.o. female who presents to [facility] with a past medical history of    Past Medical History:   Diagnosis Date    Allergic rhinitis     Anaphylaxis     Food allergies     Asthma     Carpal tunnel syndrome of right wrist 2019    Cervical radiculopathy 2019    Cervicalgia 2019    Chronic kidney disease     stage II    Dysphagia     Leg swelling     Liver lesion     Muscle cramps     Presbylarynx     Primary biliary cirrhosis     Thyroid nodule     Ulnar neuropathy at elbow 2019    Symptoms into ulnar distribution     Voice hoarseness      Annual Wellness Exam    Last pap smear: approximately 5 years ago - hx of hysterectomy at age 38 not due to cancer, but due to fibroids. She has never had an abnormal pap smear. She does have ovaries bilaterally and denies any adnexal tenderness, abdominal pain, or bloating.     Last mammogram: 2024 - benign. She does have a family history of breast cancer - sister. She is having some right breast pain as of this week - states that her Hidalgo tubercle feels inflamed. Earlier this week she felt like her nipple was deviated from midline. She has a history of a fluid filled cyst behind the nipple years ago.   Mammo Screening Digital Tomosynthesis Bilateral With CAD (2024 15:32)     Last DEXA:  - osteopenia of lumbar spine and bilateral hips  SCANNED - DEXA (10/07/2016)     Last CRC screenin2019 - benign - recall in 10 years   SCANNED - COLONOSCOPY (2019)     BMI is 31.10 kg/m2 - she is getting compounded Zepbound through a [facility] - she started compounded Wegovy initially but it gave her significant GI side effects. She is currently eating approximately 400 calories per day, except on the last 2 days of the week, right before the shot is due, she can tolerate approximately 800 calories per day. She  "tolerates a midday meal much better than breakfast or dinner. It was recommended by her GI that she initiate weight loss with medication due to her PBC. Since starting the medications her liver and renal function have improved significantly.     PHQ-2 Depression Screening  Little interest or pleasure in doing things? 0-->not at all   Feeling down, depressed, or hopeless? 0-->not at all   PHQ-2 Total Score 0       Objective   Vital Signs:   Vitals:    06/14/24 0852   BP: 120/72   BP Location: Left arm   Patient Position: Sitting   Pulse: 71   Temp: 97.3 °F (36.3 °C)   TempSrc: Temporal   SpO2: 96%   Weight: 78.4 kg (172 lb 12.8 oz)   Height: 158.8 cm (62.5\")     Body mass index is 31.1 kg/m².    Wt Readings from Last 3 Encounters:   06/14/24 78.4 kg (172 lb 12.8 oz)   11/12/23 83.7 kg (184 lb 9.6 oz)   07/17/23 84.4 kg (186 lb)     BP Readings from Last 3 Encounters:   06/14/24 120/72   11/12/23 138/75   07/17/23 112/76       Health Maintenance   Topic Date Due    ZOSTER VACCINE (1 of 2) Never done    DXA SCAN  10/07/2018    HEPATITIS C SCREENING  Never done    ANNUAL PHYSICAL  07/29/2022    RSV Vaccine - Adults (1 - 1-dose 60+ series) Never done    INFLUENZA VACCINE  08/01/2024    BMI FOLLOWUP  06/14/2025    MAMMOGRAM  03/20/2026    COLORECTAL CANCER SCREENING  06/25/2029    TDAP/TD VACCINES (4 - Td or Tdap) 06/05/2033    Pneumococcal Vaccine 0-64  Completed    Hepatitis B  Discontinued    COVID-19 Vaccine  Discontinued       Physical Exam  Vitals reviewed.   Constitutional:       General: She is not in acute distress.     Appearance: She is well-developed. She is obese.   HENT:      Head: Normocephalic and atraumatic.      Right Ear: Tympanic membrane, ear canal and external ear normal. There is no impacted cerumen.      Left Ear: Tympanic membrane, ear canal and external ear normal. There is no impacted cerumen.      Nose: Nose normal.      Mouth/Throat:      Mouth: Mucous membranes are moist.      Pharynx: " Oropharynx is clear. No oropharyngeal exudate or posterior oropharyngeal erythema.   Eyes:      General: No scleral icterus.        Right eye: No discharge.         Left eye: No discharge.      Extraocular Movements: Extraocular movements intact.      Conjunctiva/sclera: Conjunctivae normal.   Neck:      Thyroid: No thyroid mass, thyromegaly or thyroid tenderness.      Vascular: No carotid bruit.      Trachea: Trachea normal.   Cardiovascular:      Rate and Rhythm: Normal rate and regular rhythm.      Pulses: Normal pulses.   Pulmonary:      Effort: Pulmonary effort is normal.      Breath sounds: Normal breath sounds.   Chest:          Comments: Inflammation of Hidalgo tubercule of the right breast - tenderness to palpation. Diameter less than 1cm. No drainage or exudates.   Abdominal:      General: Bowel sounds are normal. There is no distension.      Palpations: Abdomen is soft. There is no mass.      Tenderness: There is no abdominal tenderness.   Musculoskeletal:         General: Normal range of motion.      Cervical back: Normal range of motion and neck supple. No tenderness.      Right lower leg: No edema.      Left lower leg: No edema.   Lymphadenopathy:      Cervical: No cervical adenopathy.   Skin:     General: Skin is warm and dry.   Neurological:      Mental Status: She is alert and oriented to person, place, and time.   Psychiatric:         Mood and Affect: Mood and affect normal.         Behavior: Behavior normal.         Thought Content: Thought content normal.         Judgment: Judgment normal.         Result Review :  The following data was reviewed by: VERONICA Mccarthy on 06/14/2024:    Clinical Support on 06/06/2024   Component Date Value    Glucose 06/06/2024 75     BUN 06/06/2024 13     Creatinine 06/06/2024 0.96     Sodium 06/06/2024 137     Potassium 06/06/2024 3.6     Chloride 06/06/2024 102     CO2 06/06/2024 27.0     Calcium 06/06/2024 9.2     Total Protein 06/06/2024 6.7      Albumin 06/06/2024 4.3     ALT (SGPT) 06/06/2024 23     AST (SGOT) 06/06/2024 23     Alkaline Phosphatase 06/06/2024 143 (H)     Total Bilirubin 06/06/2024 0.3     Globulin 06/06/2024 2.4     A/G Ratio 06/06/2024 1.8     BUN/Creatinine Ratio 06/06/2024 13.5     Anion Gap 06/06/2024 8.0     eGFR 06/06/2024 67.9      Procedures        Assessment and Plan   Diagnoses and all orders for this visit:    1. Encounter for annual physical exam (Primary)  -     CBC Auto Differential  -     Comprehensive Metabolic Panel  -     Lipid Panel  -     TSH+Free T4  -     Hepatitis C Antibody  -     Magnesium  -     Phosphorus  -     Vitamin D,25-Hydroxy    2. Vaccine counseling  Comments:  Age appropriate recommendations: [id number], Shingrix, and RSV    3. Need for pneumococcal 20-valent conjugate vaccination  -     Pneumococcal Conjugate Vaccine 20-Valent (PCV20)    4. Need for hepatitis C screening test  -     Hepatitis C Antibody    5. Postmenopausal  -     DEXA Bone Density Axial; Future    6. Osteopenia determined by x-ray  -     DEXA Bone Density Axial; Future  -     Magnesium  -     Phosphorus  -     Vitamin D,25-Hydroxy    7. B12 deficiency  -     Vitamin B12 & Folate    8. Nausea  -     ondansetron (Zofran) 4 MG tablet; Take 1 tablet by mouth Every 8 (Eight) Hours As Needed for Nausea.  Dispense: 30 tablet; Refill: 0    9. Breast cyst, right  Comments:  Inflamed Hidalgo tubercle        BMI is >= 30 and <35. (Class 1 Obesity). The following options were offered after discussion;: information on healthy weight added to patient's after visit summary          FOLLOW UP  Return in about 1 year (around 6/14/2025) for Annual physical.    Annual physical exam encouraged.  Continue weight loss efforts with compounded Zepbound, diet, and exercise.  Vaccination update provided today.  She is up-to-date on colorectal cancer screening and mammogram.  We will I will order her DEXA, which is due.    In regards to the inflamed Hidalgo  tubercle, she will take Bactrim DS twice daily for 7 days and apply mupirocin and a warm compress.  If this does not resolve within the week she will let me know and I will refer her to Dr. Lancaster for further evaluation.    Patient was given instructions and counseling regarding her condition or for health maintenance advice. Please see specific information pulled into the AVS if appropriate.       Hellen RANDHAWA Najma, APRN  06/14/24  09:44 EDT    CURRENT & DISCONTINUED MEDICATIONS  Current Outpatient Medications   Medication Instructions    cholecalciferol (VITAMIN D3) 25 MCG (1000 UT) tablet 1 tablet, Oral, Daily    EPINEPHrine (EPIPEN) 0.3 MG/0.3ML solution auto-injector injection 0.3 mL, Subcutaneous, As Needed    hydroCHLOROthiazide 12.5 mg, Oral, Daily    ipratropium-albuterol (DUO-NEB) 0.5-2.5 mg/3 ml nebulizer 3 mL, Nebulization, Every 4 Hours PRN    Lactobacillus (Probiotic Acidophilus) tablet 1 tablet, Oral, Daily    Linzess 290 mcg, Oral, Daily    ondansetron (ZOFRAN) 4 mg, Oral, Every 8 Hours PRN    pantoprazole (PROTONIX) 40 MG EC tablet Take 1 tablet (40 mg total) by mouth 1 (one) time each day. Do not crush, chew, or split.    spironolactone (ALDACTONE) 50 MG tablet Take 1 tablet (50 mg total) by mouth 1 (one) time each day.    sulfamethoxazole-trimethoprim (BACTRIM DS,SEPTRA DS) 800-160 MG per tablet 1 tablet, Oral, As Needed    ursodiol (ACTIGALL) 500 MG tablet Take 1 tablet (500 mg total) by mouth 4 (four) times a day.    valACYclovir (VALTREX) 1,000 mg, Oral, Daily    Ventolin  (90 Base) MCG/ACT inhaler Inhale 1-2 puffs 4 times a day As Needed for Wheezing.    vitamin E 400 UNIT capsule 1 capsule, Oral, Daily       Medications Discontinued During This Encounter   Medication Reason    folic acid (FOLVITE) 1 MG tablet *Therapy completed    pantoprazole (PROTONIX) 40 MG EC tablet *Therapy completed    Tirzepatide-Weight Management (Zepbound) 5 MG/0.5ML solution auto-injector *Therapy  completed    spironolactone (ALDACTONE) 50 MG tablet *Therapy completed    spironolactone (ALDACTONE) 50 MG tablet *Therapy completed

## 2024-06-17 DIAGNOSIS — N60.01 BREAST CYST, RIGHT: Primary | ICD-10-CM

## 2024-06-18 ENCOUNTER — TELEPHONE (OUTPATIENT)
Dept: SURGERY | Facility: CLINIC | Age: 59
End: 2024-06-18
Payer: COMMERCIAL

## 2024-06-18 ENCOUNTER — HOSPITAL ENCOUNTER (OUTPATIENT)
Dept: ULTRASOUND IMAGING | Facility: HOSPITAL | Age: 59
Discharge: HOME | End: 2024-06-18
Admitting: NURSE PRACTITIONER
Payer: COMMERCIAL

## 2024-06-18 DIAGNOSIS — N60.01 BREAST CYST, RIGHT: Primary | ICD-10-CM

## 2024-06-18 DIAGNOSIS — N60.01 BREAST CYST, RIGHT: ICD-10-CM

## 2024-06-18 DIAGNOSIS — N63.10 MASS OF RIGHT BREAST, UNSPECIFIED QUADRANT: Primary | ICD-10-CM

## 2024-06-18 PROCEDURE — 76642 ULTRASOUND BREAST LIMITED: CPT

## 2024-06-18 RX ORDER — DOXYCYCLINE HYCLATE 100 MG/1
100 CAPSULE ORAL 2 TIMES DAILY
Qty: 20 CAPSULE | Refills: 0 | Status: SHIPPED | OUTPATIENT
Start: 2024-06-18

## 2024-06-18 NOTE — TELEPHONE ENCOUNTER
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 DAVID MA. SHE STATED SHE HAS HAD IMAGING AND HAS A STRONG FAMILY HISTORY OF BREAST CANCER. PLEASE CALL PATIENT WITH APPOINTMENT INFORMATION. SHE STATED REFERRAL IS BEING SENT.

## 2024-06-24 ENCOUNTER — OFFICE VISIT (OUTPATIENT)
Dept: SURGERY | Facility: CLINIC | Age: 59
End: 2024-06-24
Payer: COMMERCIAL

## 2024-06-24 VITALS — RESPIRATION RATE: 17 BRPM | HEIGHT: 63 IN | WEIGHT: 168 LBS | BODY MASS INDEX: 29.77 KG/M2

## 2024-06-24 DIAGNOSIS — N64.9 NIPPLE LESION: Primary | ICD-10-CM

## 2024-06-24 PROCEDURE — 99203 OFFICE O/P NEW LOW 30 MIN: CPT | Performed by: SURGERY

## 2024-06-24 NOTE — PROGRESS NOTES
Chief Complaint: Breast Mass    Subjective         Breast Mass    Josette Carcamo is a 58 y.o. female presents to [facility] GENERAL SURGERY to be seen for right breast inflamed breast skin lesion.  It has flared once before.  This time it was treated with bactrim with no improvement and then switched to doxycycline and that did help.  It ruptured and drained on its own over the weekend but there is still a fistulous tract with some drainage.  She has a family hx of breast cancer in her sister, aunt and grandmother.  Her imaging is shown below:    Narrative & Impression   US BREAST RIGHT LIMITED-     Date of Exam: 6/18/2024 9:57 AM     Indication: Inflammation/pain of Hidalgo tubercle right breast -  worsening; N60.01-Solitary cyst of right breast.     Comparison: None available..     Technique: Targeted right breast ultrasound was performed.     FINDINGS:  Focused ultrasound examination of the right breast in the area of the  palpable abnormality described the patient demonstrates a 1.2 cm  superficial solid-appearing nodule in the skin of the periareolar  breast. No discrete fluid collections identified. No breast parenchymal  masses are identified. The patient states that the area is very and is  developing some adjacent redness/cellulitis. She states the area has not  improved on Bactrim DS. She states she will try different type of  antibiotic.     IMPRESSION:  1.2 cm superficial solid appearing nodule in the skin of the periareolar  right breast. No evidence of discrete abscess or breast parenchymal  mass. The patient states she will try different antibiotic. Recommend  Surgical consultation. A repeat ultrasound can be performed after  Surgical consultation if necessary. Findings discussed with the patient  following the examination.          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/2019    Symptoms into ulnar distribution     Voice hoarseness        Past Surgical History:   Procedure Laterality Date    BLADDER SUSPENSION      Age 32    CARPAL TUNNEL RELEASE  02/08/2019    Right CTR & right ulnar nerve decompression    CHOLECYSTECTOMY      DILATATION AND CURETTAGE      Multiple     HYSTERECTOMY      Partial    LASER RESURFACING      Yanet Sandrita    TONSILLECTOMY      Age 14    TUBAL ABDOMINAL LIGATION      Age 27    ULNAR NERVE DECOMPRESSION Right 02/08/2019         Current Outpatient Medications:     cholecalciferol (VITAMIN D3) 25 MCG (1000 UT) tablet, Take 1 tablet by mouth Daily., Disp: , Rfl:     doxycycline (VIBRAMYCIN) 100 MG capsule, Take 1 capsule by mouth 2 (Two) Times a Day., Disp: 20 capsule, Rfl: 0    EPINEPHrine (EPIPEN) 0.3 MG/0.3ML solution auto-injector injection, Inject 0.3 mL under the skin into the appropriate area as directed As Needed., Disp: , Rfl:     hydroCHLOROthiazide 12.5 MG tablet, Take 1 tablet by mouth Daily., Disp: 90 tablet, Rfl: 1    Lactobacillus (Probiotic Acidophilus) tablet, Take 1 tablet by mouth Daily., Disp: , Rfl:     linaclotide (Linzess) 290 MCG capsule capsule, Take 1 capsule by mouth Daily., Disp: 90 capsule, Rfl: 3    ondansetron (Zofran) 4 MG tablet, Take 1 tablet by mouth Every 8 (Eight) Hours As Needed for Nausea., Disp: 30 tablet, Rfl: 0    pantoprazole (PROTONIX) 40 MG EC tablet, Take 1 tablet (40 mg total) by mouth 1 (one) time each day. Do not crush, chew, or split., Disp: 90 tablet, Rfl: 3    spironolactone (ALDACTONE) 50 MG tablet, Take 1 tablet (50 mg total) by mouth 1 (one) time each day., Disp: 90 tablet, Rfl: 3     sulfamethoxazole-trimethoprim (BACTRIM DS,SEPTRA DS) 800-160 MG per tablet, Take 1 tablet by mouth As Needed (UTI prophylaxis). Indications: PROPHYLAXIS, Disp: , Rfl:     ursodiol (ACTIGALL) 500 MG tablet, Take 1 tablet (500 mg total) by mouth 4 (four) times a day., Disp: 120 tablet, Rfl: 11    valACYclovir (VALTREX) 1000 MG tablet, Take 1 tablet by mouth Daily., Disp: 90 tablet, Rfl: 3    Ventolin  (90 Base) MCG/ACT inhaler, Inhale 1-2 puffs 4 times a day As Needed for Wheezing., Disp: 54 g, Rfl: 3    vitamin E 400 UNIT capsule, Take 1 capsule by mouth Daily., Disp: , Rfl:     ipratropium-albuterol (DUO-NEB) 0.5-2.5 mg/3 ml nebulizer, Take 3 mL by nebulization Every 4 (Four) Hours As Needed for Shortness of Air or Wheezing. (Patient not taking: Reported on 6/14/2024), Disp: 360 mL, Rfl: 0    Allergies   Allergen Reactions    Cephalexin Hives    Clindamycin Hcl Hives    Erythromycin Hives    Gluten Meal Hives    Latex Shortness Of Breath and Swelling    Peanut-Containing Drug Products Hives    Penicillins Hives    Macrobid [Nitrofurantoin] Hives        Family History   Problem Relation Age of Onset    Cervical cancer Mother     Lung cancer Mother     Arthritis Mother     Heart disease Mother     Diabetes Mother     Osteoporosis Mother     Rheum arthritis Father     Lung cancer Father     Other Father         Bladder cancer     Arthritis Father     Stroke Father     Heart disease Father     Diabetes Father     Cervical cancer Sister     Breast cancer Sister     Arthritis Sister     Stroke Sister     Rheum arthritis Sister     Breast cancer Maternal Grandmother     Rheum arthritis Paternal Grandmother     Breast cancer Maternal Aunt     Colon cancer Other     Melanoma Other     Breast cancer Other        Social History     Socioeconomic History    Marital status:    Tobacco Use    Smoking status: Never    Smokeless tobacco: Never   Vaping Use    Vaping status: Never Used   Substance and Sexual Activity  "   Alcohol use: Never    Drug use: Never    Sexual activity: Defer       Vital Signs:   Resp 17   Ht 158.8 cm (62.5\")   Wt 76.2 kg (168 lb)   BMI 30.24 kg/m²    Review of Systems   Genitourinary:  Positive for breast lump.       Physical Exam  Vitals and nursing note reviewed.   Constitutional:       Appearance: Normal appearance.   HENT:      Head: Normocephalic and atraumatic.   Eyes:      Extraocular Movements: Extraocular movements intact.      Pupils: Pupils are equal, round, and reactive to light.   Cardiovascular:      Pulses: Normal pulses.   Pulmonary:      Effort: Pulmonary effort is normal. No accessory muscle usage or respiratory distress.   Chest:   Breasts:     Right: Skin change present. No inverted nipple or nipple discharge.      Left: Normal. No inverted nipple, nipple discharge or skin change.      Comments: Fistulous tract with some drainage just lateral to nipple- no further cellulitis  Abdominal:      General: Abdomen is flat.      Palpations: Abdomen is soft.      Tenderness: There is no abdominal tenderness. There is no guarding.   Musculoskeletal:         General: No swelling, tenderness or deformity.      Cervical back: Neck supple.   Lymphadenopathy:      Upper Body:      Right upper body: No supraclavicular or axillary adenopathy.      Left upper body: No supraclavicular or axillary adenopathy.   Skin:     General: Skin is warm and dry.   Neurological:      General: No focal deficit present.      Mental Status: She is alert and oriented to person, place, and time.   Psychiatric:         Mood and Affect: Mood normal.         Thought Content: Thought content normal.          Result Review :               Assessment and Plan    Diagnoses and all orders for this visit:    1. Nipple lesion (Primary)  -     Case Request; Standing  -     Follow Anesthesia Guidelines / Protocol; Standing  -     Obtain Informed Consent; Standing  -     LMX Cream Not Needed For Needle Localization Procedure in " Radiology; Standing  -     Contact Surgeon With Questions or Concerns; Standing  -     Verify / Perform Chlorhexidine Skin Prep; Standing  -     Place Sequential Compression Device; Standing  -     Maintain Sequential Compression Device; Standing  -     vancomycin (VANCOCIN) 1,250 mg in sodium chloride 0.9 % 250 mL IVPB  -     Case Request    Will plan for excision of recurrent right nipple lesion    Follow Up   Return for Next scheduled followup after surgery.  Patient was given instructions and counseling regarding her condition or for health maintenance advice. Please see specific information pulled into the AVS if appropriate.         This document has been electronically signed by Jayla Lancaster MD  June 24, 2024 08:48 EDT

## 2024-06-26 ENCOUNTER — HOSPITAL ENCOUNTER (OUTPATIENT)
Dept: BONE DENSITY | Facility: HOSPITAL | Age: 59
Discharge: HOME | End: 2024-06-26
Admitting: NURSE PRACTITIONER
Payer: COMMERCIAL

## 2024-06-26 ENCOUNTER — PATIENT ROUNDING (BHMG ONLY) (OUTPATIENT)
Dept: SURGERY | Facility: CLINIC | Age: 59
End: 2024-06-26
Payer: COMMERCIAL

## 2024-06-26 DIAGNOSIS — Z78.0 POSTMENOPAUSAL: ICD-10-CM

## 2024-06-26 DIAGNOSIS — M85.80 OSTEOPENIA DETERMINED BY X-RAY: ICD-10-CM

## 2024-06-26 PROCEDURE — 77080 DXA BONE DENSITY AXIAL: CPT

## 2024-06-26 RX ORDER — PANTOPRAZOLE SODIUM 40 MG/1
40 TABLET, DELAYED RELEASE ORAL DAILY
Qty: 90 TABLET | Refills: 0 | Status: SHIPPED | OUTPATIENT
Start: 2024-06-26

## 2024-06-26 NOTE — PROGRESS NOTES
Joann, my name is  Fidelia. I am with  [facility], [address], [place] [address]. In an effort to hear the opinions of our patients, I would like to ask you a few questions about your visit with our office.     Can you tell me about your visit with us? What things went well?    We're always looking for ways to make our patients' experiences even better. Do you have any recommendations on ways we may improve?    Overall, were you satisfied with your first visit to our practice?    Is there a particular staff member/s who you would like to recognize for doing a great job?      I appreciate you taking the time to answer these questions. The providers and staff here in our office want you to get the healthcare you need and deserve and we look forward to your comments.     Thank you for your input and have a wonderful day!

## 2024-06-28 ENCOUNTER — TELEPHONE (OUTPATIENT)
Dept: SURGERY | Facility: CLINIC | Age: 59
End: 2024-06-28
Payer: COMMERCIAL

## 2024-06-28 NOTE — PAT
"SPOKE WITH YAMIL AT [facility] ADVISED PT REPORTS TESTED POSITIVE FOR COVID ON 6/27/24 AND CURRENTLY SYMPTOMATIC WITH FATIGUE, DRY COUGH, HOARSE, AND \"FEELS LIKE MY ASTHMA IS FLARING UP\"  "

## 2024-06-28 NOTE — TELEPHONE ENCOUNTER
JEREMIE CALLED FROM [facility].  PATIENT IS SCHEDULED FOR SURGERY 07/02/24.    SHE TESTED POSITIVE FOR COVID ON 06/27/24 AND IS SYMPTOMATIC.    PATIENT REPORTS FATIGUE, DRY COUGH, HOARSENESS, AND FEELS LIKE HER ASTHMA IS ACTING UP.    PER RULE, PATIENT NEEDS TO BE RESCHEDULED OUT AT LEAST 10 DAYS.    JEREMIE #[phone]

## 2024-07-08 DIAGNOSIS — B00.1 HERPES LABIALIS: ICD-10-CM

## 2024-07-08 DIAGNOSIS — T36.95XA ANTIBIOTIC-INDUCED YEAST INFECTION: Primary | ICD-10-CM

## 2024-07-08 DIAGNOSIS — B37.9 ANTIBIOTIC-INDUCED YEAST INFECTION: Primary | ICD-10-CM

## 2024-07-08 RX ORDER — VALACYCLOVIR HYDROCHLORIDE 1 G/1
1000 TABLET, FILM COATED ORAL DAILY
Qty: 90 TABLET | Refills: 3 | Status: SHIPPED | OUTPATIENT
Start: 2024-07-08

## 2024-07-08 RX ORDER — FLUCONAZOLE 150 MG/1
150 TABLET ORAL ONCE
Qty: 2 TABLET | Refills: 0 | Status: SHIPPED | OUTPATIENT
Start: 2024-07-08 | End: 2024-07-09

## 2024-07-16 ENCOUNTER — PATIENT OUTREACH (OUTPATIENT)
Dept: ONCOLOGY | Facility: HOSPITAL | Age: 59
End: 2024-07-16
Payer: COMMERCIAL

## 2024-07-25 ENCOUNTER — ANESTHESIA EVENT (OUTPATIENT)
Dept: PERIOP | Facility: HOSPITAL | Age: 59
End: 2024-07-25
Payer: COMMERCIAL

## 2024-07-25 NOTE — PRE-PROCEDURE INSTRUCTIONS
IMPORTANT INSTRUCTIONS - PRE-ADMISSION TESTING  DO NOT EAT OR CHEW anything after midnight the night before your procedure.    You may have SIPS NO RED LESS THEN 8 OZ CLEAR liquids up to __2____ hours prior to ARRIVAL time.  Take the following medications the morning of your procedure with JUST A SIP OF WATER:  __EPI PEN IF NEEDED, DUO NEB IF NEEDED, ZOFRAN IF NEEDED, URSODIOL, VALTREX, VENTOLIN INHALER IF NEEDED AND BRING WITH YOU_____________________________________________________________________________________________________________________________________________________________________________________    DO NOT BRING your medications to the hospital with you, UNLESS something has changed since your PRE-Admission Testing appointment.  Hold all vitamins, supplements, and NSAIDS (Non- steroidal anti-inflammatory meds) for one week prior to surgery (you MAY take Tylenol or Acetaminophen).  If you are diabetic, check your blood sugar the morning of your procedure. If it is less than 70 or if you are feeling symptomatic, call the following number for further instructions: [phone]_.  Use your inhalers/nebulizers as usual, the morning of your procedure. BRING YOUR INHALERS with you.   Bring your CPAP or BIPAP to hospital, ONLY IF YOU WILL BE SPENDING THE NIGHT.   Make sure you have a ride home and have someone who will stay with you the day of your procedure after you go home.  If you have any questions, please call your Pre-Admission Testing Nurse, ___JEREMIE_____________ at [phone]____________.   Per anesthesia request, do not smoke for 24 hours before your procedure or as instructed by your surgeon.    WILL CALL ON  7/25/24       NORMALLY BETWEEN 1 AND 4 PM TO GIVE OFFICIAL ARRIVAL TIME FOR DAY OF PROCEDURE  BATHING INSTRUCTIONS GIVEN. NO JEWELRY OF ANY TYPE OR NAIL POLISH UPPER OR LOWER EXT  COME TO ENTRANCE C [facility] MAIN DOOR DAY OF PROCEDURE  CASH,  OR CARD FOR MEDS TO BED IF INDICATED AT  DISCHARGE

## 2024-07-26 ENCOUNTER — ANESTHESIA (OUTPATIENT)
Dept: PERIOP | Facility: HOSPITAL | Age: 59
End: 2024-07-26
Payer: COMMERCIAL

## 2024-07-26 ENCOUNTER — HOSPITAL ENCOUNTER (OUTPATIENT)
Facility: HOSPITAL | Age: 59
Setting detail: HOSPITAL OUTPATIENT SURGERY
Discharge: HOME | End: 2024-07-26
Attending: SURGERY | Admitting: SURGERY
Payer: COMMERCIAL

## 2024-07-26 VITALS
RESPIRATION RATE: 16 BRPM | OXYGEN SATURATION: 95 % | BODY MASS INDEX: 30.91 KG/M2 | HEIGHT: 62 IN | TEMPERATURE: 96.7 F | WEIGHT: 167.99 LBS | DIASTOLIC BLOOD PRESSURE: 82 MMHG | SYSTOLIC BLOOD PRESSURE: 132 MMHG | HEART RATE: 75 BPM

## 2024-07-26 DIAGNOSIS — N64.9 NIPPLE LESION: ICD-10-CM

## 2024-07-26 PROCEDURE — S0260 H&P FOR SURGERY: HCPCS | Performed by: SURGERY

## 2024-07-26 PROCEDURE — 25810000003 LACTATED RINGERS PER 1000 ML: Performed by: ANESTHESIOLOGY

## 2024-07-26 PROCEDURE — C1889 IMPLANT/INSERT DEVICE, NOC: HCPCS | Performed by: SURGERY

## 2024-07-26 PROCEDURE — 25010000002 HYDROMORPHONE 1 MG/ML SOLUTION: Performed by: NURSE ANESTHETIST, CERTIFIED REGISTERED

## 2024-07-26 PROCEDURE — 25010000002 VANCOMYCIN 5 G RECONSTITUTED SOLUTION: Performed by: SURGERY

## 2024-07-26 PROCEDURE — 25010000002 BUPIVACAINE (PF) 0.25 % SOLUTION: Performed by: SURGERY

## 2024-07-26 PROCEDURE — 25010000002 GENTAMICIN PER 80 MG: Performed by: SURGERY

## 2024-07-26 PROCEDURE — 25010000002 DEXAMETHASONE PER 1 MG: Performed by: NURSE ANESTHETIST, CERTIFIED REGISTERED

## 2024-07-26 PROCEDURE — 25010000002 ONDANSETRON PER 1 MG: Performed by: NURSE ANESTHETIST, CERTIFIED REGISTERED

## 2024-07-26 PROCEDURE — 25810000003 SODIUM CHLORIDE 0.9 % SOLUTION: Performed by: SURGERY

## 2024-07-26 PROCEDURE — 19120 REMOVAL OF BREAST LESION: CPT | Performed by: SURGERY

## 2024-07-26 PROCEDURE — 25010000002 PROPOFOL 10 MG/ML EMULSION: Performed by: NURSE ANESTHETIST, CERTIFIED REGISTERED

## 2024-07-26 PROCEDURE — 25010000002 MIDAZOLAM PER 1MG: Performed by: ANESTHESIOLOGY

## 2024-07-26 PROCEDURE — 88304 TISSUE EXAM BY PATHOLOGIST: CPT | Performed by: SURGERY

## 2024-07-26 DEVICE — LIGACLIP MCA MULTIPLE CLIP APPLIERS, 20 MEDIUM CLIPS
Type: IMPLANTABLE DEVICE | Site: BREAST | Status: FUNCTIONAL
Brand: LIGACLIP

## 2024-07-26 RX ORDER — ACETAMINOPHEN 500 MG
1000 TABLET ORAL ONCE
Status: COMPLETED | OUTPATIENT
Start: 2024-07-26 | End: 2024-07-26

## 2024-07-26 RX ORDER — LIDOCAINE HYDROCHLORIDE 20 MG/ML
INJECTION, SOLUTION EPIDURAL; INFILTRATION; INTRACAUDAL; PERINEURAL AS NEEDED
Status: DISCONTINUED | OUTPATIENT
Start: 2024-07-26 | End: 2024-07-26 | Stop reason: SURG

## 2024-07-26 RX ORDER — PROMETHAZINE HYDROCHLORIDE 25 MG/1
25 SUPPOSITORY RECTAL ONCE AS NEEDED
Status: DISCONTINUED | OUTPATIENT
Start: 2024-07-26 | End: 2024-07-26 | Stop reason: SDUPTHER

## 2024-07-26 RX ORDER — PROMETHAZINE HYDROCHLORIDE 12.5 MG/1
25 TABLET ORAL ONCE AS NEEDED
Status: DISCONTINUED | OUTPATIENT
Start: 2024-07-26 | End: 2024-07-26 | Stop reason: SDUPTHER

## 2024-07-26 RX ORDER — SULFAMETHOXAZOLE AND TRIMETHOPRIM 800; 160 MG/1; MG/1
1 TABLET ORAL 2 TIMES DAILY
Qty: 14 TABLET | Refills: 0 | Status: SHIPPED | OUTPATIENT
Start: 2024-07-26 | End: 2024-08-02

## 2024-07-26 RX ORDER — ONDANSETRON 4 MG/1
4 TABLET, ORALLY DISINTEGRATING ORAL ONCE AS NEEDED
Status: DISCONTINUED | OUTPATIENT
Start: 2024-07-26 | End: 2024-07-26 | Stop reason: HOSPADM

## 2024-07-26 RX ORDER — OXYCODONE HYDROCHLORIDE 5 MG/1
5 TABLET ORAL
Status: DISCONTINUED | OUTPATIENT
Start: 2024-07-26 | End: 2024-07-26 | Stop reason: SDUPTHER

## 2024-07-26 RX ORDER — DEXAMETHASONE SODIUM PHOSPHATE 4 MG/ML
INJECTION, SOLUTION INTRA-ARTICULAR; INTRALESIONAL; INTRAMUSCULAR; INTRAVENOUS; SOFT TISSUE AS NEEDED
Status: DISCONTINUED | OUTPATIENT
Start: 2024-07-26 | End: 2024-07-26 | Stop reason: SURG

## 2024-07-26 RX ORDER — HYDROCODONE BITARTRATE AND ACETAMINOPHEN 5; 325 MG/1; MG/1
1-2 TABLET ORAL EVERY 6 HOURS PRN
Qty: 15 TABLET | Refills: 0 | Status: SHIPPED | OUTPATIENT
Start: 2024-07-26

## 2024-07-26 RX ORDER — PROMETHAZINE HYDROCHLORIDE 12.5 MG/1
25 TABLET ORAL ONCE AS NEEDED
Status: DISCONTINUED | OUTPATIENT
Start: 2024-07-26 | End: 2024-07-26 | Stop reason: HOSPADM

## 2024-07-26 RX ORDER — ONDANSETRON 2 MG/ML
4 INJECTION INTRAMUSCULAR; INTRAVENOUS ONCE AS NEEDED
Status: DISCONTINUED | OUTPATIENT
Start: 2024-07-26 | End: 2024-07-26 | Stop reason: HOSPADM

## 2024-07-26 RX ORDER — MIDAZOLAM HYDROCHLORIDE 2 MG/2ML
2 INJECTION, SOLUTION INTRAMUSCULAR; INTRAVENOUS ONCE
Status: COMPLETED | OUTPATIENT
Start: 2024-07-26 | End: 2024-07-26

## 2024-07-26 RX ORDER — ONDANSETRON 2 MG/ML
4 INJECTION INTRAMUSCULAR; INTRAVENOUS ONCE AS NEEDED
Status: DISCONTINUED | OUTPATIENT
Start: 2024-07-26 | End: 2024-07-26 | Stop reason: SDUPTHER

## 2024-07-26 RX ORDER — DEXMEDETOMIDINE HYDROCHLORIDE 100 UG/ML
INJECTION, SOLUTION INTRAVENOUS AS NEEDED
Status: DISCONTINUED | OUTPATIENT
Start: 2024-07-26 | End: 2024-07-26 | Stop reason: SURG

## 2024-07-26 RX ORDER — PROMETHAZINE HYDROCHLORIDE 25 MG/1
25 SUPPOSITORY RECTAL ONCE AS NEEDED
Status: DISCONTINUED | OUTPATIENT
Start: 2024-07-26 | End: 2024-07-26 | Stop reason: HOSPADM

## 2024-07-26 RX ORDER — SODIUM CHLORIDE, SODIUM LACTATE, POTASSIUM CHLORIDE, CALCIUM CHLORIDE 600; 310; 30; 20 MG/100ML; MG/100ML; MG/100ML; MG/100ML
9 INJECTION, SOLUTION INTRAVENOUS CONTINUOUS PRN
Status: DISCONTINUED | OUTPATIENT
Start: 2024-07-26 | End: 2024-07-26 | Stop reason: HOSPADM

## 2024-07-26 RX ORDER — ONDANSETRON 2 MG/ML
INJECTION INTRAMUSCULAR; INTRAVENOUS AS NEEDED
Status: DISCONTINUED | OUTPATIENT
Start: 2024-07-26 | End: 2024-07-26 | Stop reason: SURG

## 2024-07-26 RX ORDER — SCOLOPAMINE TRANSDERMAL SYSTEM 1 MG/1
1 PATCH, EXTENDED RELEASE TRANSDERMAL ONCE
Status: DISCONTINUED | OUTPATIENT
Start: 2024-07-26 | End: 2024-07-26 | Stop reason: HOSPADM

## 2024-07-26 RX ORDER — KETAMINE HCL IN NACL, ISO-OSM 100MG/10ML
SYRINGE (ML) INJECTION AS NEEDED
Status: DISCONTINUED | OUTPATIENT
Start: 2024-07-26 | End: 2024-07-26 | Stop reason: SURG

## 2024-07-26 RX ORDER — BUPIVACAINE HYDROCHLORIDE 2.5 MG/ML
INJECTION, SOLUTION EPIDURAL; INFILTRATION; INTRACAUDAL AS NEEDED
Status: DISCONTINUED | OUTPATIENT
Start: 2024-07-26 | End: 2024-07-26 | Stop reason: HOSPADM

## 2024-07-26 RX ORDER — OXYCODONE HYDROCHLORIDE 5 MG/1
5 TABLET ORAL
Status: DISCONTINUED | OUTPATIENT
Start: 2024-07-26 | End: 2024-07-26 | Stop reason: HOSPADM

## 2024-07-26 RX ORDER — PROPOFOL 10 MG/ML
VIAL (ML) INTRAVENOUS AS NEEDED
Status: DISCONTINUED | OUTPATIENT
Start: 2024-07-26 | End: 2024-07-26 | Stop reason: SURG

## 2024-07-26 RX ADMIN — DEXMEDETOMIDINE HYDROCHLORIDE 8 MCG: 100 INJECTION, SOLUTION, CONCENTRATE INTRAVENOUS at 08:11

## 2024-07-26 RX ADMIN — VANCOMYCIN HYDROCHLORIDE 1.25 G: 5 INJECTION, POWDER, LYOPHILIZED, FOR SOLUTION INTRAVENOUS at 07:36

## 2024-07-26 RX ADMIN — VANCOMYCIN HYDROCHLORIDE 1250 MG: 5 INJECTION, POWDER, LYOPHILIZED, FOR SOLUTION INTRAVENOUS at 06:55

## 2024-07-26 RX ADMIN — LIDOCAINE HYDROCHLORIDE 80 MG: 20 INJECTION, SOLUTION INTRAVENOUS at 07:30

## 2024-07-26 RX ADMIN — ACETAMINOPHEN 1000 MG: 500 TABLET ORAL at 06:55

## 2024-07-26 RX ADMIN — PROPOFOL 150 MG: 10 INJECTION, EMULSION INTRAVENOUS at 07:30

## 2024-07-26 RX ADMIN — SODIUM CHLORIDE, POTASSIUM CHLORIDE, SODIUM LACTATE AND CALCIUM CHLORIDE 9 ML/HR: 600; 310; 30; 20 INJECTION, SOLUTION INTRAVENOUS at 06:54

## 2024-07-26 RX ADMIN — DEXMEDETOMIDINE HYDROCHLORIDE 8 MCG: 100 INJECTION, SOLUTION, CONCENTRATE INTRAVENOUS at 08:04

## 2024-07-26 RX ADMIN — ONDANSETRON HYDROCHLORIDE 4 MG: 2 SOLUTION INTRAMUSCULAR; INTRAVENOUS at 07:31

## 2024-07-26 RX ADMIN — Medication 5 MG: at 07:35

## 2024-07-26 RX ADMIN — Medication 5 MG: at 07:53

## 2024-07-26 RX ADMIN — SCOPALAMINE 1 PATCH: 1 PATCH, EXTENDED RELEASE TRANSDERMAL at 06:55

## 2024-07-26 RX ADMIN — HYDROMORPHONE HYDROCHLORIDE 0.5 MG: 1 INJECTION, SOLUTION INTRAMUSCULAR; INTRAVENOUS; SUBCUTANEOUS at 08:25

## 2024-07-26 RX ADMIN — DEXAMETHASONE SODIUM PHOSPHATE 8 MG: 4 INJECTION, SOLUTION INTRAMUSCULAR; INTRAVENOUS at 07:31

## 2024-07-26 RX ADMIN — PROPOFOL 250 MCG/KG/MIN: 10 INJECTION, EMULSION INTRAVENOUS at 07:30

## 2024-07-26 RX ADMIN — Medication 5 MG: at 07:45

## 2024-07-26 RX ADMIN — MIDAZOLAM HYDROCHLORIDE 2 MG: 1 INJECTION, SOLUTION INTRAMUSCULAR; INTRAVENOUS at 07:24

## 2024-07-26 NOTE — ANESTHESIA PREPROCEDURE EVALUATION
Anesthesia Evaluation     Patient summary reviewed and Nursing notes reviewed   history of anesthetic complications:  PONV  NPO Solid Status: > 8 hours  NPO Liquid Status: > 2 hours           Airway   Mallampati: I  TM distance: >3 FB  Neck ROM: full  No difficulty expected  Dental - normal exam     Pulmonary - normal exam    breath sounds clear to auscultation  (+) asthma,  Cardiovascular - normal exam  Exercise tolerance: good (4-7 METS)    Rhythm: regular  Rate: normal    Hypertension: hctz.      Neuro/Psych  (+) numbness  GI/Hepatic/Renal/Endo    (+) GERD (protonix), liver disease (primary biliary cirrhosis - hctz for swelling as needed) cirrhosis, renal disease- CRI, thyroid problem thyroid nodules    Musculoskeletal     (+) neck pain  Abdominal  - normal exam   Substance History - negative use     OB/GYN negative ob/gyn ROS         Other - negative ROS       ROS/Med Hx Other: PAT Nursing Notes unavailable.                     Anesthesia Plan    ASA 3     general     (Patient understands anesthesia not responsible for dental damage.)  intravenous induction     Anesthetic plan, risks, benefits, and alternatives have been provided, discussed and informed consent has been obtained with: patient and child.    Plan discussed with CRNA.        CODE STATUS:

## 2024-07-26 NOTE — H&P
Expand All Collapse All[]Expand All by Default    Chief Complaint: Breast Mass        Subjective  Breast Mass     Josette Carcamo is a 58 y.o. female presents to [facility] GENERAL SURGERY to be seen for right breast inflamed breast skin lesion.  It has flared once before.  This time it was treated with bactrim with no improvement and then switched to doxycycline and that did help.  It ruptured and drained on its own over the weekend but there is still a fistulous tract with some drainage.  She has a family hx of breast cancer in her sister, aunt and grandmother.  Her imaging is shown below:     Narrative & Impression   US BREAST RIGHT LIMITED-     Date of Exam: 6/18/2024 9:57 AM     Indication: Inflammation/pain of Hidalgo tubercle right breast -  worsening; N60.01-Solitary cyst of right breast.     Comparison: None available..     Technique: Targeted right breast ultrasound was performed.     FINDINGS:  Focused ultrasound examination of the right breast in the area of the  palpable abnormality described the patient demonstrates a 1.2 cm  superficial solid-appearing nodule in the skin of the periareolar  breast. No discrete fluid collections identified. No breast parenchymal  masses are identified. The patient states that the area is very and is  developing some adjacent redness/cellulitis. She states the area has not  improved on Bactrim DS. She states she will try different type of  antibiotic.     IMPRESSION:  1.2 cm superficial solid appearing nodule in the skin of the periareolar  right breast. No evidence of discrete abscess or breast parenchymal  mass. The patient states she will try different antibiotic. Recommend  Surgical consultation. A repeat ultrasound can be performed after  Surgical consultation if necessary. Findings discussed with the patient  following the examination.                  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     Symptoms into ulnar distribution     Voice hoarseness              Surgical History         Past Surgical History:   Procedure Laterality Date    BLADDER SUSPENSION         Age 32    CARPAL TUNNEL RELEASE   02/08/2019     Right CTR & right ulnar nerve decompression    CHOLECYSTECTOMY        DILATATION AND CURETTAGE         Multiple     HYSTERECTOMY         Partial    LASER RESURFACING         Yanet Sandrita    TONSILLECTOMY         Age 14    TUBAL ABDOMINAL LIGATION         Age 27    ULNAR NERVE DECOMPRESSION Right 02/08/2019              Current Medications      Current Outpatient Medications:     cholecalciferol (VITAMIN D3) 25 MCG (1000 UT) tablet, Take 1 tablet by mouth Daily., Disp: , Rfl:     doxycycline (VIBRAMYCIN) 100 MG capsule, Take 1 capsule by mouth 2 (Two) Times a Day., Disp: 20 capsule, Rfl: 0    EPINEPHrine (EPIPEN) 0.3 MG/0.3ML solution auto-injector injection, Inject 0.3 mL under the skin into the appropriate area as directed As Needed., Disp: , Rfl:     hydroCHLOROthiazide 12.5 MG tablet, Take 1 tablet by mouth Daily., Disp: 90 tablet, Rfl: 1    Lactobacillus (Probiotic Acidophilus) tablet, Take 1 tablet by mouth Daily., Disp: , Rfl:     linaclotide (Linzess) 290 MCG capsule capsule, Take 1 capsule by mouth Daily., Disp: 90 capsule, Rfl: 3    ondansetron (Zofran) 4 MG tablet, Take 1 tablet by mouth Every 8 (Eight) Hours As Needed for Nausea., Disp: 30 tablet, Rfl: 0    pantoprazole (PROTONIX) 40 MG EC tablet, Take 1 tablet (40 mg total) by mouth 1 (one) time each day. Do not crush, chew, or split., Disp: 90  tablet, Rfl: 3    spironolactone (ALDACTONE) 50 MG tablet, Take 1 tablet (50 mg total) by mouth 1 (one) time each day., Disp: 90 tablet, Rfl: 3    sulfamethoxazole-trimethoprim (BACTRIM DS,SEPTRA DS) 800-160 MG per tablet, Take 1 tablet by mouth As Needed (UTI prophylaxis). Indications: PROPHYLAXIS, Disp: , Rfl:     ursodiol (ACTIGALL) 500 MG tablet, Take 1 tablet (500 mg total) by mouth 4 (four) times a day., Disp: 120 tablet, Rfl: 11    valACYclovir (VALTREX) 1000 MG tablet, Take 1 tablet by mouth Daily., Disp: 90 tablet, Rfl: 3    Ventolin  (90 Base) MCG/ACT inhaler, Inhale 1-2 puffs 4 times a day As Needed for Wheezing., Disp: 54 g, Rfl: 3    vitamin E 400 UNIT capsule, Take 1 capsule by mouth Daily., Disp: , Rfl:     ipratropium-albuterol (DUO-NEB) 0.5-2.5 mg/3 ml nebulizer, Take 3 mL by nebulization Every 4 (Four) Hours As Needed for Shortness of Air or Wheezing. (Patient not taking: Reported on 6/14/2024), Disp: 360 mL, Rfl: 0        Allergies        Allergies   Allergen Reactions    Cephalexin Hives    Clindamycin Hcl Hives    Erythromycin Hives    Gluten Meal Hives    Latex Shortness Of Breath and Swelling    Peanut-Containing Drug Products Hives    Penicillins Hives    Macrobid [Nitrofurantoin] Hives                  Family History   Problem Relation Age of Onset    Cervical cancer Mother      Lung cancer Mother      Arthritis Mother      Heart disease Mother      Diabetes Mother      Osteoporosis Mother      Rheum arthritis Father      Lung cancer Father      Other Father           Bladder cancer     Arthritis Father      Stroke Father      Heart disease Father      Diabetes Father      Cervical cancer Sister      Breast cancer Sister      Arthritis Sister      Stroke Sister      Rheum arthritis Sister      Breast cancer Maternal Grandmother      Rheum arthritis Paternal Grandmother      Breast cancer Maternal Aunt      Colon cancer Other      Melanoma Other      Breast cancer Other          "  Social History   Social History           Socioeconomic History    Marital status:    Tobacco Use    Smoking status: Never    Smokeless tobacco: Never   Vaping Use    Vaping status: Never Used   Substance and Sexual Activity    Alcohol use: Never    Drug use: Never    Sexual activity: Defer            Vital Signs:   Resp 17   Ht 158.8 cm (62.5\")   Wt 76.2 kg (168 lb)   BMI 30.24 kg/m²    Review of Systems   Genitourinary:  Positive for breast lump.         Physical Exam  Vitals and nursing note reviewed.   Constitutional:       Appearance: Normal appearance.   HENT:      Head: Normocephalic and atraumatic.   Eyes:      Extraocular Movements: Extraocular movements intact.      Pupils: Pupils are equal, round, and reactive to light.   Cardiovascular:      Pulses: Normal pulses.   Pulmonary:      Effort: Pulmonary effort is normal. No accessory muscle usage or respiratory distress.   Chest:   Breasts:     Right: Skin change present. No inverted nipple or nipple discharge.      Left: Normal. No inverted nipple, nipple discharge or skin change.      Comments: Fistulous tract with some drainage just lateral to nipple- no further cellulitis  Abdominal:      General: Abdomen is flat.      Palpations: Abdomen is soft.      Tenderness: There is no abdominal tenderness. There is no guarding.   Musculoskeletal:         General: No swelling, tenderness or deformity.      Cervical back: Neck supple.   Lymphadenopathy:      Upper Body:      Right upper body: No supraclavicular or axillary adenopathy.      Left upper body: No supraclavicular or axillary adenopathy.   Skin:     General: Skin is warm and dry.   Neurological:      General: No focal deficit present.      Mental Status: She is alert and oriented to person, place, and time.   Psychiatric:         Mood and Affect: Mood normal.         Thought Content: Thought content normal.                  Result Review  :                     Assessment  Assessment and Plan  "   Diagnoses and all orders for this visit:     1. Nipple lesion (Primary)  -     Case Request; Standing  -     Follow Anesthesia Guidelines / Protocol; Standing  -     Obtain Informed Consent; Standing  -     LMX Cream Not Needed For Needle Localization Procedure in Radiology; Standing  -     Contact Surgeon With Questions or Concerns; Standing  -     Verify / Perform Chlorhexidine Skin Prep; Standing  -     Place Sequential Compression Device; Standing  -     Maintain Sequential Compression Device; Standing  -     vancomycin (VANCOCIN) 1,250 mg in sodium chloride 0.9 % 250 mL IVPB  -     Case Request     Will plan for excision of recurrent right nipple lesion     Follow Up   Return for Next scheduled followup after surgery.  Patient was given instructions and counseling regarding her condition or for health maintenance advice. Please see specific information pulled into the AVS if appropriate.

## 2024-07-26 NOTE — DISCHARGE INSTRUCTIONS
DISCHARGE INSTRUCTIONS  [x] Breast Biopsy  [] Lumpectomy  [] Lymph Node Dissection           For your surgery you had:  General anesthesia (you may have a sore throat for the first 24 hours)  IV sedation  Local anesthesia  Monitored anesthesia care  You have received a medicated patch for nausea prevention today (behind your ear). It is recommended that you remove it 24-48 hours post-operatively. It must be removed within 72 hours.   You have received an anesthesia medication today that can cause hormonal forms of birth control to be ineffective. You should use a different form of birth control (to prevent pregnancy) for 7 days.   You may experience dizziness, drowsiness, or light-headedness for several hours following surgery/procedure.  Do not stay alone today or tonight.  Limit your activity for 24 hours.  You should not drive, operate machinery, drink alcohol, or sign legally binding documents for 24 hours or while you are taking pain medication.  Resume your diet slowly.  Follow whatever special dietary instructions you may have been given by your doctor.  Last dose of pain medication was given at:   .  NOTIFY YOUR DOCTOR IF YOU EXPERIENCE ANY OF THE FOLLOWING:  Temperature greater than 101 degrees Fahrenheit  Shaking Chills  Redness or excessive drainage from incision  Nausea, vomiting and/or pain that is not controlled by prescribed medications  Increase in bleeding or bleeding that is excessive  Unable to urinate in 6 hours after surgery  If unable to reach your doctor, please go to the closest Emergency Room  You may begin dressing changes:     [] in 24 hours   [x] in 48 hours   [] Other:    You may remove your dressing on  .  You may shower or bathe:  []tomorrow  [] other    Ice pack for 24-48 hours.  Wear a bra for support.  Do not do any heavy lifting.  After your surgery you may notice some bruising.  This is normal.  Medications per physician instructions as indicated on Discharge Medication  Information Sheet.  You should see   for follow-up care   on   .  Phone number:       SPECIAL INSTRUCTIONS:

## 2024-07-26 NOTE — ANESTHESIA POSTPROCEDURE EVALUATION
Patient: Josette Carcamo    Procedure Summary       Date: 07/26/24 Room / Location: [facility] OR  / [facility]    Anesthesia Start: 0726 Anesthesia Stop: 0811    Procedure: Excision of right breast mass (Right: Breast) Diagnosis:       Nipple lesion      (Nipple lesion [N64.9])    Surgeons: Jayla Lancaster MD Provider: Shirley Roe MD    Anesthesia Type: general ASA Status: 3            Anesthesia Type: general    Vitals  Vitals Value Taken Time   BP 89/52 07/26/24 0837   Temp     Pulse 70 07/26/24 0839   Resp     SpO2 99 % 07/26/24 0839   Vitals shown include unfiled device data.        Post Anesthesia Care and Evaluation    Patient location during evaluation: bedside  Patient participation: complete - patient participated  Level of consciousness: awake  Pain management: adequate    Airway patency: patent  PONV Status: none  Cardiovascular status: acceptable and stable  Respiratory status: acceptable  Hydration status: acceptable

## 2024-07-26 NOTE — OP NOTE
BREAST LUMPECTOMY  Procedure Report    Patient Name:  Josette Carcamo  YOB: 1965    Date of Surgery:  7/26/2024     Indications:  Infected breast lesion    Pre-op Diagnosis:   Nipple lesion [N64.9]       Post-Op Diagnosis Codes:     * Nipple lesion [N64.9]    Procedure/CPT® Codes:      Procedure(s):  Excision of right nipple lesion    Staff:  Surgeon(s):  Jayla Lancaster MD    Assistant: Terrance Rivers    Anesthesia: General    Estimated Blood Loss: minimal    Implants:    Implant Name Type Inv. Item Serial No.  Lot No. LRB No. Used Action   CLIPAPPLR M/ ENDO LIGACLIP 20CLP 11IN MD - [id number] Implant CLIPAPPLR M/ ENDO LIGACLIP 20CLP 11IN MD  [facility]  [employer] [id number] 1 Implanted       Specimen:          Specimens       ID Source Type Tests Collected By Collected At Frozen?    A Breast, Right Tissue TISSUE PATHOLOGY EXAM   Jayla Lancaster MD 7/26/24 0753     Description: INFECTED NIPPLE LESION; 1 CLIP SUPERIOR, 2 CLIPS LATERAL                Findings: None    Complications: None    Description of Procedure:   The risks and benefits and treatment options were discussed with her. After informed consent was obtained, she was taken to the OR and placed supine on the table. An incision was made directly around the infected mass and an additional area of possible drainage in the areola and then skin flaps were raised anteriorly and the tissue was excised circumferentially around the infected mass until we reached normal appearing tissue.  There was a small amount of purulent material left in the cavity.  The specimen was removed and marked for orientation. It was about 1.5 cm in size.  The wound bed was irrigated. Hemostasis was achieved with electrocautery and clips. The deeper layers were closed with 2-0 Vicryl, the skin was closed with a 4-0 subcuticular stitch. The patient tolerated the procedure well and will follow up in 2 weeks.  Assistant:  Terrance Rivers  was responsible for performing the following activities: Retraction, Suction, and Irrigation and their skilled assistance was necessary for the success of this case.    This procedure was not performed to treat breast cancer through sentinel node biopsy      Jayla Lancaster MD     Date: 7/26/2024  Time: 08:16 EDT

## 2024-07-29 LAB
CYTO UR: NORMAL
LAB AP CASE REPORT: NORMAL
LAB AP CLINICAL INFORMATION: NORMAL
PATH REPORT.FINAL DX SPEC: NORMAL
PATH REPORT.GROSS SPEC: NORMAL

## 2024-07-29 RX ORDER — DOXYCYCLINE HYCLATE 100 MG/1
100 CAPSULE ORAL 2 TIMES DAILY
Qty: 14 CAPSULE | Refills: 0 | Status: SHIPPED | OUTPATIENT
Start: 2024-07-29 | End: 2024-08-05 | Stop reason: SDUPTHER

## 2024-07-30 NOTE — PROGRESS NOTES
"Chief Complaint  Follow-up    Subjective          History of Present Illness  The patient is here to follow up on excision of nipple lesion.  They are doing well and have no complaints.  Pathology is shown below:    Results for orders placed or performed during the hospital encounter of 07/26/24   Tissue Pathology Exam    Specimen: Breast, Right; Tissue   Result Value Ref Range    Case Report       Surgical Pathology Report                         Case: [id number]                                  Authorizing Provider:  Jayla Lancaster MD    Collected:           07/26/2024 07:53 AM          Ordering Location:     [facility]  Received:            07/26/2024 11:54 AM                                 OR                                                                           Pathologist:           Leif Gould MD                                                            Specimen:    Breast, Right, INFECTED NIPPLE LESION; 1 CLIP SUPERIOR, 2 CLIPS LATERAL                    Clinical Information       Nipple lesion      Final Diagnosis       Nipple lesion, excision:   - Ruptured epidermal inclusion cyst      Gross Description       1. Breast, Right.  Received in formalin and labeled \"infected nipple lesion, 1 clip superior, 2 clips lateral\" is a 1.7 x 0.5 cm right breast skin ellipse excised to a depth of 1.7 cm.  The tan, wrinkled skin is slightly puckered.  The specimen is differentially inked (see ink key) and serially sectioned to reveal a 0.5 cm cyst.  The specimen is entirely submitted in 2 cassettes.  Ink key: Anterior-skin, inferior-blue, lateral-orange, medial-yellow, posterior-black, superior-red.   ELIZABETH      Microscopic Description       Microscopic examination performed.          Objective   Vital Signs:   Resp 16   Ht 157.5 cm (62\")   Wt 74.4 kg (164 lb)   BMI 30.00 kg/m²     Physical Exam  Vitals and nursing note reviewed.   Constitutional:       General: She is not in acute " distress.     Appearance: Normal appearance. She is well-developed.   HENT:      Head: Normocephalic and atraumatic.   Eyes:      Extraocular Movements: Extraocular movements intact.      Pupils: Pupils are equal, round, and reactive to light.   Cardiovascular:      Pulses: Normal pulses.   Pulmonary:      Effort: Pulmonary effort is normal. No retractions.      Breath sounds: Normal air entry. No wheezing.   Abdominal:      General: There is no distension.      Palpations: Abdomen is soft.      Tenderness: There is no abdominal tenderness.      Hernia: No hernia is present.   Musculoskeletal:         General: No swelling or deformity.      Cervical back: Neck supple.   Skin:     General: Skin is warm and dry.      Findings: No erythema.      Comments: Surgical Incision Healing Well, erythema around the area and macro papular rash near where larger dressing was located that was present in photo has now resolved, no drainage or other issues seen on exam   Neurological:      General: No focal deficit present.      Mental Status: She is alert and oriented to person, place, and time.      Motor: Motor function is intact.   Psychiatric:         Mood and Affect: Mood normal.         Thought Content: Thought content normal.            Result Review :                 Assessment and Plan    Diagnoses and all orders for this visit:    1. Nipple lesion (Primary)    Keep apt for next week for incision check  Continue abx    Follow Up   Return in about 1 week (around 8/7/2024).  Patient was given instructions and counseling regarding her condition or for health maintenance advice. Please see specific information pulled into the AVS if appropriate.

## 2024-07-31 ENCOUNTER — OFFICE VISIT (OUTPATIENT)
Dept: SURGERY | Facility: CLINIC | Age: 59
End: 2024-07-31
Payer: COMMERCIAL

## 2024-07-31 VITALS — HEIGHT: 62 IN | WEIGHT: 164 LBS | BODY MASS INDEX: 30.18 KG/M2 | RESPIRATION RATE: 16 BRPM

## 2024-07-31 DIAGNOSIS — N64.9 NIPPLE LESION: Primary | ICD-10-CM

## 2024-07-31 PROCEDURE — 99024 POSTOP FOLLOW-UP VISIT: CPT | Performed by: SURGERY

## 2024-08-05 ENCOUNTER — OFFICE VISIT (OUTPATIENT)
Dept: SURGERY | Facility: CLINIC | Age: 59
End: 2024-08-05
Payer: COMMERCIAL

## 2024-08-05 VITALS — BODY MASS INDEX: 30.36 KG/M2 | RESPIRATION RATE: 18 BRPM | WEIGHT: 165 LBS | HEIGHT: 62 IN

## 2024-08-05 DIAGNOSIS — N64.9 NIPPLE LESION: Primary | ICD-10-CM

## 2024-08-05 PROCEDURE — 99024 POSTOP FOLLOW-UP VISIT: CPT | Performed by: SURGERY

## 2024-08-05 RX ORDER — DOXYCYCLINE HYCLATE 100 MG/1
100 CAPSULE ORAL 2 TIMES DAILY
Qty: 14 CAPSULE | Refills: 0 | Status: SHIPPED | OUTPATIENT
Start: 2024-08-05 | End: 2024-08-12

## 2024-08-05 NOTE — PROGRESS NOTES
"Chief Complaint  Follow-up    Subjective          Follow-up    The patient is here to follow up on excision of nipple lesion.  They are doing well and have no complaints.  Pathology is shown below:    Results for orders placed or performed during the hospital encounter of 07/26/24   Tissue Pathology Exam    Specimen: Breast, Right; Tissue   Result Value Ref Range    Case Report       Surgical Pathology Report                         Case: [id number]                                  Authorizing Provider:  Jayla Lancaster MD    Collected:           07/26/2024 07:53 AM          Ordering Location:     [facility]  Received:            07/26/2024 11:54 AM                                 OR                                                                           Pathologist:           Leif Gould MD                                                            Specimen:    Breast, Right, INFECTED NIPPLE LESION; 1 CLIP SUPERIOR, 2 CLIPS LATERAL                    Clinical Information       Nipple lesion      Final Diagnosis       Nipple lesion, excision:   - Ruptured epidermal inclusion cyst      Gross Description       1. Breast, Right.  Received in formalin and labeled \"infected nipple lesion, 1 clip superior, 2 clips lateral\" is a 1.7 x 0.5 cm right breast skin ellipse excised to a depth of 1.7 cm.  The tan, wrinkled skin is slightly puckered.  The specimen is differentially inked (see ink key) and serially sectioned to reveal a 0.5 cm cyst.  The specimen is entirely submitted in 2 cassettes.  Ink key: Anterior-skin, inferior-blue, lateral-orange, medial-yellow, posterior-black, superior-red.   ELIZABETH      Microscopic Description       Microscopic examination performed.          Objective   Vital Signs:   Resp 18   Ht 157.5 cm (62\")   Wt 74.8 kg (165 lb)   BMI 30.18 kg/m²     Physical Exam  Vitals and nursing note reviewed.   Constitutional:       General: She is not in acute distress.     " Appearance: Normal appearance. She is well-developed.   HENT:      Head: Normocephalic and atraumatic.   Eyes:      Extraocular Movements: Extraocular movements intact.      Pupils: Pupils are equal, round, and reactive to light.   Cardiovascular:      Pulses: Normal pulses.   Pulmonary:      Effort: Pulmonary effort is normal. No retractions.      Breath sounds: Normal air entry. No wheezing.   Abdominal:      General: There is no distension.      Palpations: Abdomen is soft.      Tenderness: There is no abdominal tenderness.      Hernia: No hernia is present.   Musculoskeletal:         General: No swelling or deformity.      Cervical back: Neck supple.   Skin:     General: Skin is warm and dry.      Findings: No erythema.      Comments: Surgical Incision Healing Well, erythema completely resolved still and looks great   Neurological:      General: No focal deficit present.      Mental Status: She is alert and oriented to person, place, and time.      Motor: Motor function is intact.   Psychiatric:         Mood and Affect: Mood normal.         Thought Content: Thought content normal.            Result Review :                 Assessment and Plan    Diagnoses and all orders for this visit:    1. Nipple lesion (Primary)    Other orders  -     doxycycline (VIBRAMYCIN) 100 MG capsule; Take 1 capsule by mouth 2 (Two) Times a Day for 7 days.  Dispense: 14 capsule; Refill: 0      Followup prn    Follow Up   Return if symptoms worsen or fail to improve.  Patient was given instructions and counseling regarding her condition or for health maintenance advice. Please see specific information pulled into the AVS if appropriate.

## 2024-08-21 DIAGNOSIS — K74.3 PRIMARY BILIARY CIRRHOSIS: Primary | ICD-10-CM

## 2024-08-21 DIAGNOSIS — N28.9 RENAL INSUFFICIENCY: ICD-10-CM

## 2024-08-22 ENCOUNTER — CLINICAL SUPPORT (OUTPATIENT)
Dept: FAMILY MEDICINE CLINIC | Facility: CLINIC | Age: 59
End: 2024-08-22
Payer: COMMERCIAL

## 2024-08-22 DIAGNOSIS — N28.9 RENAL INSUFFICIENCY: ICD-10-CM

## 2024-08-22 DIAGNOSIS — K74.3 PRIMARY BILIARY CIRRHOSIS: ICD-10-CM

## 2024-08-22 LAB
ALBUMIN SERPL-MCNC: 4.4 G/DL (ref 3.5–5.2)
ALBUMIN/GLOB SERPL: 1.7 G/DL
ALP SERPL-CCNC: 190 U/L (ref 39–117)
ALT SERPL W P-5'-P-CCNC: 24 U/L (ref 1–33)
ANION GAP SERPL CALCULATED.3IONS-SCNC: 9 MMOL/L (ref 5–15)
AST SERPL-CCNC: 28 U/L (ref 1–32)
BILIRUB SERPL-MCNC: 0.2 MG/DL (ref 0–1.2)
BUN SERPL-MCNC: 12 MG/DL (ref 8–23)
BUN/CREAT SERPL: 14.1 (ref 7–25)
CALCIUM SPEC-SCNC: 9.6 MG/DL (ref 8.6–10.5)
CHLORIDE SERPL-SCNC: 104 MMOL/L (ref 98–107)
CO2 SERPL-SCNC: 25 MMOL/L (ref 22–29)
CREAT SERPL-MCNC: 0.85 MG/DL (ref 0.57–1)
EGFRCR SERPLBLD CKD-EPI 2021: 78.1 ML/MIN/1.73
GLOBULIN UR ELPH-MCNC: 2.6 GM/DL
GLUCOSE SERPL-MCNC: 79 MG/DL (ref 65–99)
POTASSIUM SERPL-SCNC: 4.5 MMOL/L (ref 3.5–5.2)
PROT SERPL-MCNC: 7 G/DL (ref 6–8.5)
SODIUM SERPL-SCNC: 138 MMOL/L (ref 136–145)

## 2024-08-22 PROCEDURE — 80053 COMPREHEN METABOLIC PANEL: CPT | Performed by: NURSE PRACTITIONER

## 2024-08-22 PROCEDURE — 36415 COLL VENOUS BLD VENIPUNCTURE: CPT | Performed by: NURSE PRACTITIONER

## 2024-08-22 NOTE — PROGRESS NOTES
Venipuncture Blood Specimen Collection  Venipuncture performed in left arm by Zeyad Funk with good hemostasis. Patient tolerated the procedure well without complications.   08/22/24   Zeyad Funk

## 2024-09-03 DIAGNOSIS — J02.0 STREP PHARYNGITIS: Primary | ICD-10-CM

## 2024-09-03 DIAGNOSIS — T36.95XA ANTIBIOTIC-INDUCED YEAST INFECTION: ICD-10-CM

## 2024-09-03 DIAGNOSIS — B37.9 ANTIBIOTIC-INDUCED YEAST INFECTION: ICD-10-CM

## 2024-09-03 RX ORDER — FLUCONAZOLE 150 MG/1
150 TABLET ORAL ONCE
Qty: 1 TABLET | Refills: 0 | Status: SHIPPED | OUTPATIENT
Start: 2024-09-03 | End: 2024-09-04

## 2024-09-03 RX ORDER — DOXYCYCLINE 100 MG/1
100 CAPSULE ORAL 2 TIMES DAILY
Qty: 20 CAPSULE | Refills: 0 | Status: SHIPPED | OUTPATIENT
Start: 2024-09-03

## 2024-09-12 ENCOUNTER — CLINICAL SUPPORT (OUTPATIENT)
Dept: FAMILY MEDICINE CLINIC | Facility: CLINIC | Age: 59
End: 2024-09-12
Payer: COMMERCIAL

## 2024-09-12 DIAGNOSIS — N28.9 RENAL INSUFFICIENCY: ICD-10-CM

## 2024-09-12 DIAGNOSIS — K74.3 PRIMARY BILIARY CIRRHOSIS: Primary | ICD-10-CM

## 2024-09-12 DIAGNOSIS — K74.3 PRIMARY BILIARY CIRRHOSIS: ICD-10-CM

## 2024-09-12 LAB
ALBUMIN SERPL-MCNC: 4.1 G/DL (ref 3.5–5.2)
ALBUMIN/GLOB SERPL: 1.6 G/DL
ALP SERPL-CCNC: 174 U/L (ref 39–117)
ALT SERPL W P-5'-P-CCNC: 18 U/L (ref 1–33)
ANION GAP SERPL CALCULATED.3IONS-SCNC: 9.5 MMOL/L (ref 5–15)
AST SERPL-CCNC: 24 U/L (ref 1–32)
BILIRUB SERPL-MCNC: 0.3 MG/DL (ref 0–1.2)
BUN SERPL-MCNC: 11 MG/DL (ref 8–23)
BUN/CREAT SERPL: 11 (ref 7–25)
CALCIUM SPEC-SCNC: 9.7 MG/DL (ref 8.6–10.5)
CHLORIDE SERPL-SCNC: 103 MMOL/L (ref 98–107)
CO2 SERPL-SCNC: 26.5 MMOL/L (ref 22–29)
CREAT SERPL-MCNC: 1 MG/DL (ref 0.57–1)
EGFRCR SERPLBLD CKD-EPI 2021: 64.2 ML/MIN/1.73
GLOBULIN UR ELPH-MCNC: 2.5 GM/DL
GLUCOSE SERPL-MCNC: 89 MG/DL (ref 65–99)
POTASSIUM SERPL-SCNC: 4.7 MMOL/L (ref 3.5–5.2)
PROT SERPL-MCNC: 6.6 G/DL (ref 6–8.5)
SODIUM SERPL-SCNC: 139 MMOL/L (ref 136–145)

## 2024-09-12 PROCEDURE — 80053 COMPREHEN METABOLIC PANEL: CPT | Performed by: NURSE PRACTITIONER

## 2024-09-12 PROCEDURE — 36415 COLL VENOUS BLD VENIPUNCTURE: CPT | Performed by: NURSE PRACTITIONER

## 2024-09-12 NOTE — PROGRESS NOTES
Venipuncture Blood Specimen Collection  Venipuncture performed in right arm  by Shelli Arvizu with good hemostasis. Patient tolerated the procedure well without complications.   09/12/24   Shelli Arvizu

## 2024-09-19 ENCOUNTER — CLINICAL SUPPORT (OUTPATIENT)
Dept: FAMILY MEDICINE CLINIC | Facility: CLINIC | Age: 59
End: 2024-09-19
Payer: COMMERCIAL

## 2024-09-19 DIAGNOSIS — J30.9 ALLERGIC RHINITIS, UNSPECIFIED SEASONALITY, UNSPECIFIED TRIGGER: Primary | ICD-10-CM

## 2024-09-19 PROCEDURE — 95117 IMMUNOTHERAPY INJECTIONS: CPT | Performed by: NURSE PRACTITIONER

## 2024-09-27 ENCOUNTER — TELEMEDICINE (OUTPATIENT)
Dept: FAMILY MEDICINE CLINIC | Facility: CLINIC | Age: 59
End: 2024-09-27
Payer: COMMERCIAL

## 2024-09-27 VITALS
BODY MASS INDEX: 28.63 KG/M2 | WEIGHT: 155.6 LBS | SYSTOLIC BLOOD PRESSURE: 116 MMHG | OXYGEN SATURATION: 98 % | DIASTOLIC BLOOD PRESSURE: 63 MMHG | HEART RATE: 81 BPM | HEIGHT: 62 IN

## 2024-09-27 DIAGNOSIS — M81.0 AGE-RELATED OSTEOPOROSIS WITHOUT CURRENT PATHOLOGICAL FRACTURE: Primary | ICD-10-CM

## 2024-09-27 PROCEDURE — 99213 OFFICE O/P EST LOW 20 MIN: CPT | Performed by: NURSE PRACTITIONER

## 2024-10-29 ENCOUNTER — CLINICAL SUPPORT (OUTPATIENT)
Dept: FAMILY MEDICINE CLINIC | Facility: CLINIC | Age: 59
End: 2024-10-29
Payer: COMMERCIAL

## 2024-10-29 DIAGNOSIS — M81.0 AGE-RELATED OSTEOPOROSIS WITHOUT CURRENT PATHOLOGICAL FRACTURE: ICD-10-CM

## 2024-10-29 LAB
ALBUMIN SERPL-MCNC: 4.1 G/DL (ref 3.5–5.2)
ALBUMIN/GLOB SERPL: 1.6 G/DL
ALP SERPL-CCNC: 170 U/L (ref 39–117)
ALT SERPL W P-5'-P-CCNC: 19 U/L (ref 1–33)
ANION GAP SERPL CALCULATED.3IONS-SCNC: 10.2 MMOL/L (ref 5–15)
AST SERPL-CCNC: 27 U/L (ref 1–32)
BILIRUB SERPL-MCNC: 0.3 MG/DL (ref 0–1.2)
BUN SERPL-MCNC: 13 MG/DL (ref 8–23)
BUN/CREAT SERPL: 14.3 (ref 7–25)
CALCIUM SPEC-SCNC: 9.4 MG/DL (ref 8.6–10.5)
CHLORIDE SERPL-SCNC: 103 MMOL/L (ref 98–107)
CO2 SERPL-SCNC: 25.8 MMOL/L (ref 22–29)
CREAT SERPL-MCNC: 0.91 MG/DL (ref 0.57–1)
EGFRCR SERPLBLD CKD-EPI 2021: 71.9 ML/MIN/1.73
GLOBULIN UR ELPH-MCNC: 2.5 GM/DL
GLUCOSE SERPL-MCNC: 76 MG/DL (ref 65–99)
MAGNESIUM SERPL-MCNC: 2.4 MG/DL (ref 1.6–2.4)
PHOSPHATE SERPL-MCNC: 3.7 MG/DL (ref 2.5–4.5)
POTASSIUM SERPL-SCNC: 4.3 MMOL/L (ref 3.5–5.2)
PROT SERPL-MCNC: 6.6 G/DL (ref 6–8.5)
SODIUM SERPL-SCNC: 139 MMOL/L (ref 136–145)

## 2024-10-29 PROCEDURE — 84100 ASSAY OF PHOSPHORUS: CPT | Performed by: NURSE PRACTITIONER

## 2024-10-29 PROCEDURE — 36415 COLL VENOUS BLD VENIPUNCTURE: CPT | Performed by: NURSE PRACTITIONER

## 2024-10-29 PROCEDURE — 80053 COMPREHEN METABOLIC PANEL: CPT | Performed by: NURSE PRACTITIONER

## 2024-10-29 PROCEDURE — 83735 ASSAY OF MAGNESIUM: CPT | Performed by: NURSE PRACTITIONER

## 2024-10-29 NOTE — PROGRESS NOTES
..  Venipuncture Blood Specimen Collection  Venipuncture performed in LT arm by Marine Mcdonnell MA with good hemostasis. Patient tolerated the procedure well without complications.   10/29/24   Marine Mcdonnell MA

## 2024-11-01 DIAGNOSIS — M81.0 AGE-RELATED OSTEOPOROSIS WITHOUT CURRENT PATHOLOGICAL FRACTURE: Primary | ICD-10-CM

## 2024-11-05 ENCOUNTER — CLINICAL SUPPORT (OUTPATIENT)
Dept: FAMILY MEDICINE CLINIC | Facility: CLINIC | Age: 59
End: 2024-11-05
Payer: COMMERCIAL

## 2024-11-05 DIAGNOSIS — Z23 NEED FOR INFLUENZA VACCINATION: Primary | ICD-10-CM

## 2024-11-05 PROCEDURE — 90471 IMMUNIZATION ADMIN: CPT | Performed by: NURSE PRACTITIONER

## 2024-11-05 PROCEDURE — 90656 IIV3 VACC NO PRSV 0.5 ML IM: CPT | Performed by: NURSE PRACTITIONER

## 2024-11-08 ENCOUNTER — HOSPITAL ENCOUNTER (OUTPATIENT)
Dept: INFUSION THERAPY | Facility: HOSPITAL | Age: 59
Discharge: HOME | End: 2024-11-08
Payer: COMMERCIAL

## 2024-11-08 VITALS
TEMPERATURE: 97.8 F | DIASTOLIC BLOOD PRESSURE: 72 MMHG | RESPIRATION RATE: 20 BRPM | WEIGHT: 158.29 LBS | OXYGEN SATURATION: 94 % | BODY MASS INDEX: 29.13 KG/M2 | SYSTOLIC BLOOD PRESSURE: 104 MMHG | HEART RATE: 81 BPM | HEIGHT: 62 IN

## 2024-11-08 DIAGNOSIS — M81.0 AGE-RELATED OSTEOPOROSIS WITHOUT CURRENT PATHOLOGICAL FRACTURE: Primary | ICD-10-CM

## 2024-11-08 PROCEDURE — 25010000002 DENOSUMAB 60 MG/ML SOLUTION PREFILLED SYRINGE: Performed by: NURSE PRACTITIONER

## 2024-11-08 PROCEDURE — 96372 THER/PROPH/DIAG INJ SC/IM: CPT

## 2024-11-08 RX ADMIN — DENOSUMAB 60 MG: 60 INJECTION SUBCUTANEOUS at 08:00

## 2024-11-09 ENCOUNTER — CLINICAL SUPPORT (OUTPATIENT)
Dept: FAMILY MEDICINE CLINIC | Facility: CLINIC | Age: 59
End: 2024-11-09
Payer: COMMERCIAL

## 2024-11-09 ENCOUNTER — PATIENT MESSAGE (OUTPATIENT)
Dept: FAMILY MEDICINE CLINIC | Facility: CLINIC | Age: 59
End: 2024-11-09

## 2024-11-09 DIAGNOSIS — T78.40XA ALLERGIC REACTION, INITIAL ENCOUNTER: Primary | ICD-10-CM

## 2024-11-09 RX ORDER — METHYLPREDNISOLONE SODIUM SUCCINATE 40 MG/ML
40 INJECTION, POWDER, LYOPHILIZED, FOR SOLUTION INTRAMUSCULAR; INTRAVENOUS ONCE
Status: COMPLETED | OUTPATIENT
Start: 2024-11-09 | End: 2024-11-09

## 2024-11-09 RX ADMIN — METHYLPREDNISOLONE SODIUM SUCCINATE 40 MG: 40 INJECTION, POWDER, LYOPHILIZED, FOR SOLUTION INTRAMUSCULAR; INTRAVENOUS at 07:54

## 2024-11-11 RX ORDER — METHYLPREDNISOLONE 4 MG/1
TABLET ORAL
Qty: 21 TABLET | Refills: 0 | Status: SHIPPED | OUTPATIENT
Start: 2024-11-11

## 2024-11-25 ENCOUNTER — CLINICAL SUPPORT (OUTPATIENT)
Dept: FAMILY MEDICINE CLINIC | Facility: CLINIC | Age: 59
End: 2024-11-25
Payer: COMMERCIAL

## 2024-11-25 DIAGNOSIS — K74.3 PRIMARY BILIARY CIRRHOSIS: Primary | ICD-10-CM

## 2024-11-25 DIAGNOSIS — N28.9 RENAL INSUFFICIENCY: ICD-10-CM

## 2024-11-25 DIAGNOSIS — K74.3 PRIMARY BILIARY CIRRHOSIS: ICD-10-CM

## 2024-11-25 LAB
ALBUMIN SERPL-MCNC: 4.1 G/DL (ref 3.5–5.2)
ALBUMIN/GLOB SERPL: 1.4 G/DL
ALP SERPL-CCNC: 208 U/L (ref 39–117)
ALT SERPL W P-5'-P-CCNC: 21 U/L (ref 1–33)
ANION GAP SERPL CALCULATED.3IONS-SCNC: 8 MMOL/L (ref 5–15)
AST SERPL-CCNC: 21 U/L (ref 1–32)
BILIRUB SERPL-MCNC: 0.2 MG/DL (ref 0–1.2)
BUN SERPL-MCNC: 14 MG/DL (ref 8–23)
BUN/CREAT SERPL: 16.5 (ref 7–25)
CALCIUM SPEC-SCNC: 8.8 MG/DL (ref 8.6–10.5)
CHLORIDE SERPL-SCNC: 106 MMOL/L (ref 98–107)
CO2 SERPL-SCNC: 23 MMOL/L (ref 22–29)
CREAT SERPL-MCNC: 0.85 MG/DL (ref 0.57–1)
EGFRCR SERPLBLD CKD-EPI 2021: 78.1 ML/MIN/1.73
GLOBULIN UR ELPH-MCNC: 2.9 GM/DL
GLUCOSE SERPL-MCNC: 77 MG/DL (ref 65–99)
POTASSIUM SERPL-SCNC: 4.5 MMOL/L (ref 3.5–5.2)
PROT SERPL-MCNC: 7 G/DL (ref 6–8.5)
SODIUM SERPL-SCNC: 137 MMOL/L (ref 136–145)

## 2024-11-25 PROCEDURE — 36415 COLL VENOUS BLD VENIPUNCTURE: CPT | Performed by: NURSE PRACTITIONER

## 2024-11-25 PROCEDURE — 80053 COMPREHEN METABOLIC PANEL: CPT | Performed by: NURSE PRACTITIONER

## 2024-11-25 NOTE — PROGRESS NOTES
Venipuncture Blood Specimen Collection  Venipuncture performed in left arm by Shelli Arvizu with good hemostasis. Patient tolerated the procedure well without complications.   11/25/24   Zeyad Funk

## 2024-12-13 DIAGNOSIS — B02.9 HERPES ZOSTER WITHOUT COMPLICATION: Primary | ICD-10-CM

## 2024-12-13 RX ORDER — VALACYCLOVIR HYDROCHLORIDE 1 G/1
1000 TABLET, FILM COATED ORAL 3 TIMES DAILY
Qty: 21 TABLET | Refills: 0 | Status: SHIPPED | OUTPATIENT
Start: 2024-12-13

## 2024-12-30 DIAGNOSIS — K74.3 PRIMARY BILIARY CIRRHOSIS: Primary | ICD-10-CM

## 2024-12-31 ENCOUNTER — CLINICAL SUPPORT (OUTPATIENT)
Dept: FAMILY MEDICINE CLINIC | Facility: CLINIC | Age: 59
End: 2024-12-31
Payer: COMMERCIAL

## 2024-12-31 DIAGNOSIS — K74.3 PRIMARY BILIARY CIRRHOSIS: ICD-10-CM

## 2024-12-31 LAB
ALBUMIN SERPL-MCNC: 4.4 G/DL (ref 3.5–5.2)
ALBUMIN/GLOB SERPL: 1.5 G/DL
ALP SERPL-CCNC: 174 U/L (ref 39–117)
ALT SERPL W P-5'-P-CCNC: 22 U/L (ref 1–33)
ANION GAP SERPL CALCULATED.3IONS-SCNC: 9 MMOL/L (ref 5–15)
AST SERPL-CCNC: 27 U/L (ref 1–32)
BILIRUB SERPL-MCNC: 0.2 MG/DL (ref 0–1.2)
BUN SERPL-MCNC: 11 MG/DL (ref 8–23)
BUN/CREAT SERPL: 12.1 (ref 7–25)
CALCIUM SPEC-SCNC: 9.4 MG/DL (ref 8.6–10.5)
CHLORIDE SERPL-SCNC: 100 MMOL/L (ref 98–107)
CO2 SERPL-SCNC: 27 MMOL/L (ref 22–29)
CREAT SERPL-MCNC: 0.91 MG/DL (ref 0.57–1)
EGFRCR SERPLBLD CKD-EPI 2021: 71.9 ML/MIN/1.73
GLOBULIN UR ELPH-MCNC: 2.9 GM/DL
GLUCOSE SERPL-MCNC: 78 MG/DL (ref 65–99)
POTASSIUM SERPL-SCNC: 4.5 MMOL/L (ref 3.5–5.2)
PROT SERPL-MCNC: 7.3 G/DL (ref 6–8.5)
SODIUM SERPL-SCNC: 136 MMOL/L (ref 136–145)

## 2024-12-31 PROCEDURE — 36415 COLL VENOUS BLD VENIPUNCTURE: CPT | Performed by: NURSE PRACTITIONER

## 2024-12-31 PROCEDURE — 80053 COMPREHEN METABOLIC PANEL: CPT | Performed by: NURSE PRACTITIONER

## 2024-12-31 NOTE — PROGRESS NOTES
Venipuncture Blood Specimen Collection  Venipuncture performed in [place] by Marine Mcdonnell with good hemostasis. Patient tolerated the procedure well without complications.   12/31/24   Tanya Lowry MA

## 2025-01-03 ENCOUNTER — TELEMEDICINE (OUTPATIENT)
Dept: FAMILY MEDICINE CLINIC | Facility: CLINIC | Age: 60
End: 2025-01-03
Payer: COMMERCIAL

## 2025-01-03 DIAGNOSIS — U07.1 UPPER RESPIRATORY TRACT INFECTION DUE TO COVID-19 VIRUS: Primary | ICD-10-CM

## 2025-01-03 DIAGNOSIS — J06.9 UPPER RESPIRATORY TRACT INFECTION DUE TO COVID-19 VIRUS: Primary | ICD-10-CM

## 2025-01-03 PROCEDURE — 99213 OFFICE O/P EST LOW 20 MIN: CPT | Performed by: NURSE PRACTITIONER

## 2025-01-03 NOTE — PROGRESS NOTES
Mode of Visit: Video via [employer]  Location of patient: home  Physician's location is at clinic ([facility], [address] [place] [address]).  You have chosen to receive care through a telehealth visit.  The patient has signed the video visit consent form.  The visit included audio and video interaction.      Chief Complaint  No chief complaint on file.    Subjective          Josette Carcamo presents to [facility] via telehealth encounter.   HPI    She reports exposure to a patient with COVID-19 on Monday of this week.  She had performed his physical exam and provided refills and at the end of the visit he advised her that he was ill.  Testing was positive for COVID-19.  Yesterday she developed a sore/scratchy throat which has since progressed to nasal congestion with rhinorrhea, as well as cough.  She has had some mild GI symptoms.  She denies any wheezing or shortness of breath at present.  She is interested in Paxlovid for treatment.  No reported fever, chills, or bodyaches.    Current Outpatient Medications   Medication Instructions    cholecalciferol (VITAMIN D3) 25 MCG (1000 UT) tablet 1 tablet, Oral, Daily    EPINEPHrine (EPIPEN) 0.3 MG/0.3ML solution auto-injector injection 0.3 mL, As Needed    hydroCHLOROthiazide 12.5 mg, Oral, Daily    ipratropium-albuterol (DUO-NEB) 0.5-2.5 mg/3 ml nebulizer 3 mL, Nebulization, Every 4 Hours PRN    Lactobacillus (Probiotic Acidophilus) tablet 1 tablet, Oral, Daily    linaclotide (Linzess) 290 MCG capsule capsule Take 290 mcg (1 capsule)  by mouth 1 (one) time each day.    Nirmatrelvir & Ritonavir, 300mg/100mg, (PAXLOVID) 3 tablets, Oral, 2 Times Daily    ondansetron (ZOFRAN) 4 mg, Oral, Every 8 Hours PRN    pantoprazole (PROTONIX) 20 MG EC tablet Take 1 tablet (20 mg total) by mouth 1 (one) time each day. Do not crush, chew, or split.    pantoprazole (PROTONIX) 40 mg, Oral, Daily, Do not crush, chew, or split.     spironolactone (ALDACTONE) 50 MG tablet Take 1 tablet (50 mg total) by mouth 1 (one) time each day.    spironolactone (ALDACTONE) 50 MG tablet Take 1 tablet (50 mg total) by mouth 1 (one) time each day.    spironolactone (ALDACTONE) 50 MG tablet Take 1 tablet (50 mg total) by mouth 1 (one) time each day.    spironolactone (ALDACTONE) 50 MG tablet Take 1 tablet (50 mg total) by mouth 1 (one) time each day.    sulfamethoxazole-trimethoprim (BACTRIM DS,SEPTRA DS) 800-160 MG per tablet 1 tablet, Oral, As Needed    ursodiol (ACTIGALL) 500 MG tablet Take 1 tablet (500 mg total) by mouth 4 (four) times a day.    valACYclovir (VALTREX) 1,000 mg, Oral, Daily    valACYclovir (VALTREX) 1,000 mg, Oral, 3 Times Daily    Ventolin  (90 Base) MCG/ACT inhaler Inhale 1-2 puffs 4 times a day As Needed for Wheezing.    vitamin E 400 UNIT capsule 1 capsule, Oral, Daily       The following portions of the patient's history were reviewed and updated as appropriate: allergies, current medications, past family history, past medical history, past social history, past surgical history, and problem list.    Objective   Vital Signs:   There were no vitals taken for this visit.    Wt Readings from Last 3 Encounters:   11/08/24 71.8 kg (158 lb 4.6 oz)   09/27/24 70.6 kg (155 lb 9.6 oz)   08/05/24 74.8 kg (165 lb)     BP Readings from Last 3 Encounters:   11/08/24 104/72   09/27/24 116/63   07/26/24 132/82       Physical Exam  Constitutional:       General: She is not in acute distress.     Appearance: Normal appearance. She is not ill-appearing, toxic-appearing or diaphoretic.   HENT:      Head: Normocephalic and atraumatic.   Eyes:      General: No scleral icterus.     Extraocular Movements: Extraocular movements intact.      Conjunctiva/sclera: Conjunctivae normal.   Pulmonary:      Effort: Pulmonary effort is normal. No respiratory distress.      Breath sounds: No stridor. No wheezing.   Musculoskeletal:         General: Normal range of  motion.      Cervical back: Normal range of motion.   Skin:     Coloration: Skin is not pale.      Findings: No erythema.   Neurological:      General: No focal deficit present.      Mental Status: She is alert.   Psychiatric:         Mood and Affect: Mood normal.         Behavior: Behavior normal.       Result Review :     Common labs          10/29/2024    07:49 11/25/2024    16:21 12/31/2024    08:38   Common Labs   Glucose 76  77  78    BUN 13  14  11    Creatinine 0.91  0.85  0.91    Sodium 139  137  136    Potassium 4.3  4.5  4.5    Chloride 103  106  100    Calcium 9.4  8.8  9.4    Albumin 4.1  4.1  4.4    Total Bilirubin 0.3  0.2  0.2    Alkaline Phosphatase 170  208  174    AST (SGOT) 27  21  27    ALT (SGPT) 19  21  22               Assessment and Plan    Diagnoses and all orders for this visit:    Diagnoses and all orders for this visit:    1. Upper respiratory tract infection due to COVID-19 virus (Primary)  -     Nirmatrelvir & Ritonavir, 300mg/100mg, (PAXLOVID); Take 3 tablets by mouth 2 (Two) Times a Day.  Dispense: 30 tablet; Refill: 0        Assessment & Plan        Medications Discontinued During This Encounter   Medication Reason    denosumab (PROLIA) 60 MG/ML solution prefilled syringe syringe *Therapy completed    methylPREDNISolone (MEDROL) 4 MG dose pack *Therapy completed          Follow Up     Return if symptoms worsen or fail to improve.    I will prescribe Paxlovid due to symptom onset within the past 5 days with known exposure and positive home COVID-19 test.  She will follow-up with any progressive/worsening symptoms.    Patient was given instructions and counseling regarding his condition or for health maintenance advice. Please see specific information pulled into the AVS if appropriate.

## 2025-02-01 ENCOUNTER — OFFICE VISIT (OUTPATIENT)
Dept: FAMILY MEDICINE CLINIC | Facility: CLINIC | Age: 60
End: 2025-02-01
Payer: COMMERCIAL

## 2025-02-01 VITALS
HEIGHT: 62 IN | HEART RATE: 88 BPM | WEIGHT: 151 LBS | OXYGEN SATURATION: 98 % | TEMPERATURE: 98.3 F | BODY MASS INDEX: 27.79 KG/M2 | SYSTOLIC BLOOD PRESSURE: 124 MMHG | DIASTOLIC BLOOD PRESSURE: 76 MMHG

## 2025-02-01 DIAGNOSIS — Z83.438 FAMILY HISTORY OF DYSLIPIDEMIA: ICD-10-CM

## 2025-02-01 DIAGNOSIS — G89.29 CHRONIC RIGHT HIP PAIN: ICD-10-CM

## 2025-02-01 DIAGNOSIS — N28.9 RENAL INSUFFICIENCY: ICD-10-CM

## 2025-02-01 DIAGNOSIS — Z82.49 FAMILY HISTORY OF CHF (CONGESTIVE HEART FAILURE): ICD-10-CM

## 2025-02-01 DIAGNOSIS — Z82.49 FAMILY HISTORY OF PULMONARY HYPERTENSION: ICD-10-CM

## 2025-02-01 DIAGNOSIS — M25.551 CHRONIC RIGHT HIP PAIN: ICD-10-CM

## 2025-02-01 DIAGNOSIS — K74.3 PRIMARY BILIARY CIRRHOSIS: Primary | ICD-10-CM

## 2025-02-01 DIAGNOSIS — E78.00 ELEVATED LDL CHOLESTEROL LEVEL: ICD-10-CM

## 2025-02-01 LAB
ALBUMIN SERPL-MCNC: 4.1 G/DL (ref 3.5–5.2)
ALBUMIN/GLOB SERPL: 1.6 G/DL
ALP SERPL-CCNC: 145 U/L (ref 39–117)
ALT SERPL W P-5'-P-CCNC: 18 U/L (ref 1–33)
ANION GAP SERPL CALCULATED.3IONS-SCNC: 10 MMOL/L (ref 5–15)
AST SERPL-CCNC: 23 U/L (ref 1–32)
BILIRUB SERPL-MCNC: 0.3 MG/DL (ref 0–1.2)
BUN SERPL-MCNC: 15 MG/DL (ref 8–23)
BUN/CREAT SERPL: 18.1 (ref 7–25)
CALCIUM SPEC-SCNC: 9 MG/DL (ref 8.6–10.5)
CHLORIDE SERPL-SCNC: 104 MMOL/L (ref 98–107)
CO2 SERPL-SCNC: 25 MMOL/L (ref 22–29)
CREAT SERPL-MCNC: 0.83 MG/DL (ref 0.57–1)
EGFRCR SERPLBLD CKD-EPI 2021: 80.3 ML/MIN/1.73
GLOBULIN UR ELPH-MCNC: 2.6 GM/DL
GLUCOSE SERPL-MCNC: 73 MG/DL (ref 65–99)
POTASSIUM SERPL-SCNC: 4.4 MMOL/L (ref 3.5–5.2)
PROT SERPL-MCNC: 6.7 G/DL (ref 6–8.5)
SODIUM SERPL-SCNC: 139 MMOL/L (ref 136–145)

## 2025-02-01 PROCEDURE — 80053 COMPREHEN METABOLIC PANEL: CPT | Performed by: NURSE PRACTITIONER

## 2025-02-01 PROCEDURE — 99214 OFFICE O/P EST MOD 30 MIN: CPT | Performed by: NURSE PRACTITIONER

## 2025-02-01 NOTE — PROGRESS NOTES
..  Venipuncture Blood Specimen Collection  Venipuncture performed in left arm  by Nisha Reynoso with good hemostasis. Patient tolerated the procedure well without complications.   02/01/25   Nisha Reynoso

## 2025-02-01 NOTE — PROGRESS NOTES
Chief Complaint  Labs Only and Hip Pain (Rt hip pain, pt wants an xray)    History of Present Illness  Josette Carcamo is a 59 y.o. female who presents to [facility] with a past medical history of    Past Medical History:   Diagnosis Date    Allergic rhinitis     Anaphylaxis     Food allergies     Asthma     Breast mass, right     Carpal tunnel syndrome of right wrist 01/31/2019    HAD RIGHT CTR IN FEB 2019    Cervical radiculopathy 01/03/2019    Cervicalgia 01/03/2019    Chronic kidney disease     stage II    Dysphagia     Leg swelling     Liver lesion     Muscle cramps     Palpitations     HX OF REPORTS HAS SEEN DR MURPHY IN THE PAST BUT WAS RELEASED. DENIES CP/SOA. ACTIVE    PONV (postoperative nausea and vomiting)     Presbylarynx     Primary biliary cirrhosis     CURRENT LABS WNL NO ISSUES CURRENTLY ON ACTIGALL    Thyroid nodule     BEING MONITORED    Ulnar neuropathy at elbow 02/21/2019    HAD ULNAR DECOMPRESSION AND CARPAL TUNNEL DONE FEB 2019    Voice hoarseness      Patient is a 59-year-old female who presents to the office today for lab work secondary to PBC, but also chronic right hip pain.    She has PBC and we will see her hepatologist this coming week.  She would like to get her CMP prior to the appointment.  Her last CMP was on 12/31/2024.  She had a slight increase in her alk phos in November, but prior to that lab she experienced an allergic reaction to a Prolia infusion.  Her repeat alk phos 1 month later showed improvement, down to 174.  She is hopeful that it has reduced since that time; however, she did recently experience COVID-19 infection and it does typically increase when she is ill.  The lowest her alk phos has been in the past 8 months was 143.  The highest was 208.  She is prescribed ursodiol by her hepatologist, but he also has her using compounded Zepbound for management of her LFTs.  She has experienced approximately 40 pound weight loss in the past  "year with use of Zepbound.  She is happy with her weight loss thus far and was actually hoping to be able to discontinue the use of GLP-1 therapy; however, her hepatologist wants her to remain on this long-term for the benefit of her PBC.    She is having chronic right hip pain without any known injury.  She has known osteoporosis.  Because of her PBC and dental issues her hepatologist and dentist/periodontist do not want her taking oral bisphosphonates.  She was given a trial of Prolia and suffered allergic reaction with edema and hives.  She had to take steroids oral and injectable to help resolve the reaction.  She is currently taking a vitamin D supplement.  Her vitamin D in June was 44.3.  She had a normal magnesium and phosphorus.  Hip pain is felt laterally and anteriorly toward the groin.  She states when she goes from sitting to standing the pain is seemingly exacerbated.    Objective   Vital Signs:   Vitals:    02/01/25 0749   BP: 124/76   BP Location: Left arm   Pulse: 88   Temp: 98.3 °F (36.8 °C)   TempSrc: Temporal   SpO2: 98%   Weight: 68.5 kg (151 lb)   Height: 157.5 cm (62\")     Body mass index is 27.62 kg/m².    Wt Readings from Last 3 Encounters:   02/01/25 68.5 kg (151 lb)   11/08/24 71.8 kg (158 lb 4.6 oz)   09/27/24 70.6 kg (155 lb 9.6 oz)     BP Readings from Last 3 Encounters:   02/01/25 124/76   11/08/24 104/72   09/27/24 116/63       Health Maintenance   Topic Date Due    ZOSTER VACCINE (1 of 2) Never done    ANNUAL PHYSICAL  06/14/2025    BMI FOLLOWUP  06/14/2025    MAMMOGRAM  03/20/2026    COLORECTAL CANCER SCREENING  06/25/2029    TDAP/TD VACCINES (4 - Td or Tdap) 06/05/2033    HEPATITIS C SCREENING  Completed    Pneumococcal Vaccine 0-64  Completed    INFLUENZA VACCINE  Completed    Hepatitis B  Discontinued    COVID-19 Vaccine  Discontinued       Physical Exam  Vitals reviewed.   Constitutional:       General: She is not in acute distress.     Appearance: She is well-developed and " overweight. She is not ill-appearing.   HENT:      Head: Normocephalic and atraumatic.   Eyes:      General: No scleral icterus.        Right eye: No discharge.         Left eye: No discharge.      Extraocular Movements: Extraocular movements intact.      Conjunctiva/sclera: Conjunctivae normal.   Cardiovascular:      Rate and Rhythm: Normal rate and regular rhythm.      Pulses: Normal pulses.      Heart sounds: No murmur heard.  Pulmonary:      Effort: Pulmonary effort is normal.      Breath sounds: Normal breath sounds. No wheezing, rhonchi or rales.   Musculoskeletal:         General: Normal range of motion.      Cervical back: Normal range of motion.      Right hip: Tenderness (laterally) present. No deformity, lacerations or crepitus. Normal range of motion (no decreased ROM, but does note pain with external rotation of the hip).      Right lower leg: No edema.      Left lower leg: No edema.   Skin:     General: Skin is warm and dry.   Neurological:      Mental Status: She is alert and oriented to person, place, and time.   Psychiatric:         Mood and Affect: Mood and affect normal.         Behavior: Behavior normal.         Thought Content: Thought content normal.         Judgment: Judgment normal.         Result Review :  The following data was reviewed by: VERONICA Mccarthy on 02/01/2025:    No visits with results within 1 Month(s) from this visit.   Latest known visit with results is:   Clinical Support on 12/31/2024   Component Date Value    Glucose 12/31/2024 78     BUN 12/31/2024 11     Creatinine 12/31/2024 0.91     Sodium 12/31/2024 136     Potassium 12/31/2024 4.5     Chloride 12/31/2024 100     CO2 12/31/2024 27.0     Calcium 12/31/2024 9.4     Total Protein 12/31/2024 7.3     Albumin 12/31/2024 4.4     ALT (SGPT) 12/31/2024 22     AST (SGOT) 12/31/2024 27     Alkaline Phosphatase 12/31/2024 174 (H)     Total Bilirubin 12/31/2024 0.2     Globulin 12/31/2024 2.9     A/G Ratio 12/31/2024 1.5      BUN/Creatinine Ratio 12/31/2024 12.1     Anion Gap 12/31/2024 9.0     eGFR 12/31/2024 71.9        Procedures        Assessment and Plan   Diagnoses and all orders for this visit:    1. Primary biliary cirrhosis (Primary)  -     Comprehensive Metabolic Panel  -     Comprehensive Metabolic Panel; Future    2. Renal insufficiency  -     Comprehensive Metabolic Panel  -     Comprehensive Metabolic Panel; Future    3. Chronic right hip pain  -     XR Hip With or Without Pelvis 2 - 3 View Right    4. Elevated LDL cholesterol level  -     Lipid Panel; Future  -     Apolipoprotein B; Future  -     Lipoprotein A (LPA); Future    5. Family history of CHF (congestive heart failure)  -     Lipid Panel; Future  -     Apolipoprotein B; Future  -     Lipoprotein A (LPA); Future    6. Family history of pulmonary hypertension  -     Lipid Panel; Future  -     Apolipoprotein B; Future  -     Lipoprotein A (LPA); Future    7. Family history of dyslipidemia  -     Lipid Panel; Future  -     Apolipoprotein B; Future  -     Lipoprotein A (LPA); Future           FOLLOW UP  Return in about 4 months (around 6/16/2025) for Annual physical, medication refills and fasting labs.    We will check CMP today for her hepatologist.    She will get a right hip x-ray.  We will contact her with results once received.    I will place future orders for CMP in 1 month, as this is trended monthly for the alkaline phosphatase, but I will add a lipid panel and apolipoprotein B and lipoprotein a as she does have an elevated LDL, is postmenopausal, and has a family history significant for CHF, pulmonary hypertension, and dyslipidemia, as well as a personal history of obesity.  If lipids remain abnormal, and she has an elevated lipoprotein a or apolipoprotein B, then I would suggest statin therapy if approved by her hepatologist.    Patient was given instructions and counseling regarding her condition or for health maintenance advice. Please see specific  information pulled into the AVS if appropriate.       Hellen EDIS Yao, APRN  02/01/25  08:25 EST    CURRENT & DISCONTINUED MEDICATIONS  Current Outpatient Medications   Medication Instructions    cholecalciferol (VITAMIN D3) 25 MCG (1000 UT) tablet 1 tablet, Daily    EPINEPHrine (EPIPEN) 0.3 MG/0.3ML solution auto-injector injection 0.3 mL, As Needed    hydroCHLOROthiazide 12.5 mg, Oral, Daily    ipratropium-albuterol (DUO-NEB) 0.5-2.5 mg/3 ml nebulizer 3 mL, Nebulization, Every 4 Hours PRN    Lactobacillus (Probiotic Acidophilus) tablet 1 tablet, Daily    linaclotide (Linzess) 290 MCG capsule capsule Take 290 mcg (1 capsule)  by mouth 1 (one) time each day.    ondansetron (ZOFRAN) 4 mg, Oral, Every 8 Hours PRN    pantoprazole (PROTONIX) 20 MG EC tablet Take 1 tablet (20 mg total) by mouth 1 (one) time each day. Do not crush, chew, or split.    pantoprazole (PROTONIX) 40 mg, Oral, Daily, Do not crush, chew, or split.    spironolactone (ALDACTONE) 50 MG tablet Take 1 tablet (50 mg total) by mouth 1 (one) time each day.    sulfamethoxazole-trimethoprim (BACTRIM DS,SEPTRA DS) 800-160 MG per tablet 1 tablet, As Needed    ursodiol (ACTIGALL) 500 MG tablet Take 1 tablet (500 mg total) by mouth 4 (four) times a day.    valACYclovir (VALTREX) 1,000 mg, Oral, Daily    Ventolin  (90 Base) MCG/ACT inhaler Inhale 1-2 puffs 4 times a day As Needed for Wheezing.    vitamin E 400 UNIT capsule 1 capsule, Daily       Medications Discontinued During This Encounter   Medication Reason    valACYclovir (Valtrex) 1000 MG tablet *Therapy completed    Nirmatrelvir & Ritonavir, 300mg/100mg, (PAXLOVID) *Therapy completed    spironolactone (ALDACTONE) 50 MG tablet Duplicate order

## 2025-02-20 ENCOUNTER — CLINICAL SUPPORT (OUTPATIENT)
Dept: FAMILY MEDICINE CLINIC | Facility: CLINIC | Age: 60
End: 2025-02-20
Payer: COMMERCIAL

## 2025-02-20 ENCOUNTER — TELEPHONE (OUTPATIENT)
Dept: FAMILY MEDICINE CLINIC | Facility: CLINIC | Age: 60
End: 2025-02-20
Payer: COMMERCIAL

## 2025-02-20 DIAGNOSIS — J45.41 MODERATE PERSISTENT ASTHMA WITH EXACERBATION: Primary | ICD-10-CM

## 2025-02-20 DIAGNOSIS — R05.1 ACUTE COUGH: Primary | ICD-10-CM

## 2025-02-20 DIAGNOSIS — R09.81 NASAL CONGESTION: ICD-10-CM

## 2025-02-20 PROCEDURE — 96372 THER/PROPH/DIAG INJ SC/IM: CPT | Performed by: NURSE PRACTITIONER

## 2025-02-20 RX ORDER — METHYLPREDNISOLONE SODIUM SUCCINATE 40 MG/ML
40 INJECTION INTRAMUSCULAR; INTRAVENOUS ONCE
Status: COMPLETED | OUTPATIENT
Start: 2025-02-20 | End: 2025-02-20

## 2025-02-20 RX ADMIN — METHYLPREDNISOLONE SODIUM SUCCINATE 40 MG: 40 INJECTION INTRAMUSCULAR; INTRAVENOUS at 12:11

## 2025-03-08 ENCOUNTER — CLINICAL SUPPORT (OUTPATIENT)
Dept: FAMILY MEDICINE CLINIC | Facility: CLINIC | Age: 60
End: 2025-03-08
Payer: COMMERCIAL

## 2025-03-08 DIAGNOSIS — Z82.49 FAMILY HISTORY OF CHF (CONGESTIVE HEART FAILURE): ICD-10-CM

## 2025-03-08 DIAGNOSIS — E78.00 ELEVATED LDL CHOLESTEROL LEVEL: ICD-10-CM

## 2025-03-08 DIAGNOSIS — K74.3 PRIMARY BILIARY CIRRHOSIS: ICD-10-CM

## 2025-03-08 DIAGNOSIS — N28.9 RENAL INSUFFICIENCY: ICD-10-CM

## 2025-03-08 DIAGNOSIS — Z83.438 FAMILY HISTORY OF DYSLIPIDEMIA: ICD-10-CM

## 2025-03-08 DIAGNOSIS — Z82.49 FAMILY HISTORY OF PULMONARY HYPERTENSION: ICD-10-CM

## 2025-03-08 PROCEDURE — 80053 COMPREHEN METABOLIC PANEL: CPT | Performed by: NURSE PRACTITIONER

## 2025-03-08 PROCEDURE — 36415 COLL VENOUS BLD VENIPUNCTURE: CPT | Performed by: NURSE PRACTITIONER

## 2025-03-08 PROCEDURE — 82172 ASSAY OF APOLIPOPROTEIN: CPT | Performed by: NURSE PRACTITIONER

## 2025-03-08 PROCEDURE — 83695 ASSAY OF LIPOPROTEIN(A): CPT | Performed by: NURSE PRACTITIONER

## 2025-03-08 PROCEDURE — 80061 LIPID PANEL: CPT | Performed by: NURSE PRACTITIONER

## 2025-03-08 NOTE — PROGRESS NOTES
Venipuncture Blood Specimen Collection  Venipuncture performed in leftarm by Freda Weeks with good hemostasis. Patient tolerated the procedure well without complications.   03/08/25   Freda Weeks

## 2025-03-09 LAB
ALBUMIN SERPL-MCNC: 4.1 G/DL (ref 3.5–5.2)
ALBUMIN/GLOB SERPL: 1.5 G/DL
ALP SERPL-CCNC: 189 U/L (ref 39–117)
ALT SERPL W P-5'-P-CCNC: 19 U/L (ref 1–33)
ANION GAP SERPL CALCULATED.3IONS-SCNC: 6.7 MMOL/L (ref 5–15)
AST SERPL-CCNC: 23 U/L (ref 1–32)
BILIRUB SERPL-MCNC: <0.2 MG/DL (ref 0–1.2)
BUN SERPL-MCNC: 17 MG/DL (ref 8–23)
BUN/CREAT SERPL: 16.2 (ref 7–25)
CALCIUM SPEC-SCNC: 9.7 MG/DL (ref 8.6–10.5)
CHLORIDE SERPL-SCNC: 105 MMOL/L (ref 98–107)
CHOLEST SERPL-MCNC: 198 MG/DL (ref 0–200)
CO2 SERPL-SCNC: 26.3 MMOL/L (ref 22–29)
CREAT SERPL-MCNC: 1.05 MG/DL (ref 0.57–1)
EGFRCR SERPLBLD CKD-EPI 2021: 60.6 ML/MIN/1.73
GLOBULIN UR ELPH-MCNC: 2.7 GM/DL
GLUCOSE SERPL-MCNC: 75 MG/DL (ref 65–99)
HDLC SERPL-MCNC: 57 MG/DL (ref 40–60)
LDLC SERPL CALC-MCNC: 132 MG/DL (ref 0–100)
LDLC/HDLC SERPL: 2.31 {RATIO}
POTASSIUM SERPL-SCNC: 5.3 MMOL/L (ref 3.5–5.2)
PROT SERPL-MCNC: 6.8 G/DL (ref 6–8.5)
SODIUM SERPL-SCNC: 138 MMOL/L (ref 136–145)
TRIGL SERPL-MCNC: 46 MG/DL (ref 0–150)
VLDLC SERPL-MCNC: 9 MG/DL (ref 5–40)

## 2025-03-11 LAB — LPA SERPL-SCNC: 468.3 NMOL/L

## 2025-03-12 LAB — APO B SERPL-MCNC: 109 MG/DL

## 2025-03-24 ENCOUNTER — APPOINTMENT (OUTPATIENT)
Dept: CT IMAGING | Facility: HOSPITAL | Age: 60
End: 2025-03-24

## 2025-03-27 ENCOUNTER — CLINICAL SUPPORT (OUTPATIENT)
Dept: FAMILY MEDICINE CLINIC | Facility: CLINIC | Age: 60
End: 2025-03-27
Payer: COMMERCIAL

## 2025-03-27 DIAGNOSIS — E87.5 SERUM POTASSIUM ELEVATED: ICD-10-CM

## 2025-03-27 DIAGNOSIS — K74.3 PRIMARY BILIARY CIRRHOSIS: ICD-10-CM

## 2025-03-27 LAB
ANION GAP SERPL CALCULATED.3IONS-SCNC: 11.1 MMOL/L (ref 5–15)
BUN SERPL-MCNC: 13 MG/DL (ref 8–23)
BUN/CREAT SERPL: 13.8 (ref 7–25)
CALCIUM SPEC-SCNC: 8.5 MG/DL (ref 8.6–10.5)
CHLORIDE SERPL-SCNC: 104 MMOL/L (ref 98–107)
CO2 SERPL-SCNC: 21.9 MMOL/L (ref 22–29)
CREAT SERPL-MCNC: 0.94 MG/DL (ref 0.57–1)
EGFRCR SERPLBLD CKD-EPI 2021: 69.2 ML/MIN/1.73
GLUCOSE SERPL-MCNC: 95 MG/DL (ref 65–99)
POTASSIUM SERPL-SCNC: 4.8 MMOL/L (ref 3.5–5.2)
SODIUM SERPL-SCNC: 137 MMOL/L (ref 136–145)

## 2025-03-27 PROCEDURE — 80048 BASIC METABOLIC PNL TOTAL CA: CPT | Performed by: NURSE PRACTITIONER

## 2025-03-27 PROCEDURE — 36415 COLL VENOUS BLD VENIPUNCTURE: CPT | Performed by: NURSE PRACTITIONER

## 2025-03-27 NOTE — PROGRESS NOTES
Venipuncture Blood Specimen Collection  Venipuncture performed in left arm by Shelli Arvizu with good hemostasis. Patient tolerated the procedure well without complications.   03/27/25   Freda Weeks

## 2025-03-31 ENCOUNTER — HOSPITAL ENCOUNTER (OUTPATIENT)
Dept: CT IMAGING | Facility: HOSPITAL | Age: 60
Discharge: HOME | End: 2025-03-31
Admitting: NURSE PRACTITIONER

## 2025-03-31 DIAGNOSIS — Z82.49 FAMILY HISTORY OF PULMONARY HYPERTENSION: ICD-10-CM

## 2025-03-31 DIAGNOSIS — Z83.438 FAMILY HISTORY OF DYSLIPIDEMIA: ICD-10-CM

## 2025-03-31 DIAGNOSIS — E78.00 ELEVATED LDL CHOLESTEROL LEVEL: ICD-10-CM

## 2025-03-31 DIAGNOSIS — Z82.49 FAMILY HISTORY OF CHF (CONGESTIVE HEART FAILURE): ICD-10-CM

## 2025-03-31 DIAGNOSIS — Z91.89 AT INCREASED RISK FOR CARDIOVASCULAR DISEASE: ICD-10-CM

## 2025-03-31 PROCEDURE — 75571 CT HRT W/O DYE W/CA TEST: CPT

## 2025-04-21 ENCOUNTER — CLINICAL SUPPORT (OUTPATIENT)
Dept: FAMILY MEDICINE CLINIC | Facility: CLINIC | Age: 60
End: 2025-04-21
Payer: COMMERCIAL

## 2025-04-21 DIAGNOSIS — K74.3 PRIMARY BILIARY CIRRHOSIS: Primary | ICD-10-CM

## 2025-04-21 LAB
ALBUMIN SERPL-MCNC: 4.4 G/DL (ref 3.5–5.2)
ALBUMIN/GLOB SERPL: 1.7 G/DL
ALP SERPL-CCNC: 105 U/L (ref 39–117)
ALT SERPL W P-5'-P-CCNC: 16 U/L (ref 1–33)
ANION GAP SERPL CALCULATED.3IONS-SCNC: 10 MMOL/L (ref 5–15)
AST SERPL-CCNC: 21 U/L (ref 1–32)
BILIRUB SERPL-MCNC: 0.2 MG/DL (ref 0–1.2)
BUN SERPL-MCNC: 16 MG/DL (ref 8–23)
BUN/CREAT SERPL: 14.8 (ref 7–25)
CALCIUM SPEC-SCNC: 9.5 MG/DL (ref 8.6–10.5)
CHLORIDE SERPL-SCNC: 102 MMOL/L (ref 98–107)
CO2 SERPL-SCNC: 24 MMOL/L (ref 22–29)
CREAT SERPL-MCNC: 1.08 MG/DL (ref 0.57–1)
EGFRCR SERPLBLD CKD-EPI 2021: 58.6 ML/MIN/1.73
GLOBULIN UR ELPH-MCNC: 2.6 GM/DL
GLUCOSE SERPL-MCNC: 96 MG/DL (ref 65–99)
POTASSIUM SERPL-SCNC: 4.3 MMOL/L (ref 3.5–5.2)
PROT SERPL-MCNC: 7 G/DL (ref 6–8.5)
SODIUM SERPL-SCNC: 136 MMOL/L (ref 136–145)

## 2025-04-21 PROCEDURE — 80053 COMPREHEN METABOLIC PANEL: CPT | Performed by: NURSE PRACTITIONER

## 2025-04-21 PROCEDURE — 36415 COLL VENOUS BLD VENIPUNCTURE: CPT | Performed by: NURSE PRACTITIONER

## 2025-04-21 NOTE — PROGRESS NOTES
Venipuncture Blood Specimen Collection  Venipuncture performed in LEFT ARM by Shelli Arvizu with good hemostasis. Patient tolerated the procedure well without complications.   04/21/25   Shelli Arvizu

## 2025-04-22 ENCOUNTER — CLINICAL SUPPORT (OUTPATIENT)
Dept: FAMILY MEDICINE CLINIC | Facility: CLINIC | Age: 60
End: 2025-04-22
Payer: COMMERCIAL

## 2025-04-22 DIAGNOSIS — R00.2 PALPITATIONS: ICD-10-CM

## 2025-04-22 DIAGNOSIS — R00.2 PALPITATIONS: Primary | ICD-10-CM

## 2025-04-22 DIAGNOSIS — R07.89 CHEST PAIN, ATYPICAL: Primary | ICD-10-CM

## 2025-04-22 LAB
ALBUMIN SERPL-MCNC: 4.4 G/DL (ref 3.5–5.2)
ALBUMIN/GLOB SERPL: 1.8 G/DL
ALP SERPL-CCNC: 93 U/L (ref 39–117)
ALT SERPL W P-5'-P-CCNC: 13 U/L (ref 1–33)
ANION GAP SERPL CALCULATED.3IONS-SCNC: 11.2 MMOL/L (ref 5–15)
AST SERPL-CCNC: 24 U/L (ref 1–32)
BASOPHILS # BLD AUTO: 0.03 10*3/MM3 (ref 0–0.2)
BASOPHILS NFR BLD AUTO: 0.5 % (ref 0–1.5)
BILIRUB SERPL-MCNC: 0.2 MG/DL (ref 0–1.2)
BUN SERPL-MCNC: 16 MG/DL (ref 8–23)
BUN/CREAT SERPL: 14.4 (ref 7–25)
CALCIUM SPEC-SCNC: 9.5 MG/DL (ref 8.6–10.5)
CHLORIDE SERPL-SCNC: 102 MMOL/L (ref 98–107)
CO2 SERPL-SCNC: 25.8 MMOL/L (ref 22–29)
CREAT SERPL-MCNC: 1.11 MG/DL (ref 0.57–1)
DEPRECATED RDW RBC AUTO: 43.9 FL (ref 37–54)
EGFRCR SERPLBLD CKD-EPI 2021: 56.7 ML/MIN/1.73
EOSINOPHIL # BLD AUTO: 0.26 10*3/MM3 (ref 0–0.4)
EOSINOPHIL NFR BLD AUTO: 4.6 % (ref 0.3–6.2)
ERYTHROCYTE [DISTWIDTH] IN BLOOD BY AUTOMATED COUNT: 13 % (ref 12.3–15.4)
GLOBULIN UR ELPH-MCNC: 2.5 GM/DL
GLUCOSE SERPL-MCNC: 93 MG/DL (ref 65–99)
HCT VFR BLD AUTO: 37.3 % (ref 34–46.6)
HGB BLD-MCNC: 12.5 G/DL (ref 12–15.9)
IMM GRANULOCYTES # BLD AUTO: 0 10*3/MM3 (ref 0–0.05)
IMM GRANULOCYTES NFR BLD AUTO: 0 % (ref 0–0.5)
LYMPHOCYTES # BLD AUTO: 1.74 10*3/MM3 (ref 0.7–3.1)
LYMPHOCYTES NFR BLD AUTO: 30.6 % (ref 19.6–45.3)
MAGNESIUM SERPL-MCNC: 2.3 MG/DL (ref 1.6–2.4)
MCH RBC QN AUTO: 31.6 PG (ref 26.6–33)
MCHC RBC AUTO-ENTMCNC: 33.5 G/DL (ref 31.5–35.7)
MCV RBC AUTO: 94.2 FL (ref 79–97)
MONOCYTES # BLD AUTO: 0.34 10*3/MM3 (ref 0.1–0.9)
MONOCYTES NFR BLD AUTO: 6 % (ref 5–12)
NEUTROPHILS NFR BLD AUTO: 3.31 10*3/MM3 (ref 1.7–7)
NEUTROPHILS NFR BLD AUTO: 58.3 % (ref 42.7–76)
NRBC BLD AUTO-RTO: 0 /100 WBC (ref 0–0.2)
PLATELET # BLD AUTO: 400 10*3/MM3 (ref 140–450)
PMV BLD AUTO: 10.4 FL (ref 6–12)
POTASSIUM SERPL-SCNC: 4.7 MMOL/L (ref 3.5–5.2)
PROT SERPL-MCNC: 6.9 G/DL (ref 6–8.5)
RBC # BLD AUTO: 3.96 10*6/MM3 (ref 3.77–5.28)
SODIUM SERPL-SCNC: 139 MMOL/L (ref 136–145)
T4 FREE SERPL-MCNC: 1.48 NG/DL (ref 0.92–1.68)
TROPONIN T SERPL HS-MCNC: 8 NG/L
TSH SERPL DL<=0.05 MIU/L-ACNC: 1.77 UIU/ML (ref 0.27–4.2)
WBC NRBC COR # BLD AUTO: 5.68 10*3/MM3 (ref 3.4–10.8)

## 2025-04-22 PROCEDURE — 36415 COLL VENOUS BLD VENIPUNCTURE: CPT | Performed by: FAMILY MEDICINE

## 2025-04-22 PROCEDURE — 84484 ASSAY OF TROPONIN QUANT: CPT | Performed by: FAMILY MEDICINE

## 2025-04-22 PROCEDURE — 93000 ELECTROCARDIOGRAM COMPLETE: CPT | Performed by: FAMILY MEDICINE

## 2025-04-22 PROCEDURE — 83735 ASSAY OF MAGNESIUM: CPT | Performed by: FAMILY MEDICINE

## 2025-04-22 PROCEDURE — 84439 ASSAY OF FREE THYROXINE: CPT | Performed by: FAMILY MEDICINE

## 2025-04-22 PROCEDURE — 80050 GENERAL HEALTH PANEL: CPT | Performed by: FAMILY MEDICINE

## 2025-04-22 NOTE — PROGRESS NOTES
Venipuncture Blood Specimen Collection  Venipuncture performed by Shelli Arvizu with good hemostasis. Patient tolerated the procedure well without complications.   04/22/25   Tanya Lowry MA

## 2025-04-28 ENCOUNTER — OFFICE VISIT (OUTPATIENT)
Dept: FAMILY MEDICINE CLINIC | Facility: CLINIC | Age: 60
End: 2025-04-28
Payer: COMMERCIAL

## 2025-04-28 VITALS
TEMPERATURE: 97.1 F | DIASTOLIC BLOOD PRESSURE: 64 MMHG | WEIGHT: 146 LBS | SYSTOLIC BLOOD PRESSURE: 106 MMHG | BODY MASS INDEX: 26.7 KG/M2

## 2025-04-28 DIAGNOSIS — M25.571 ACUTE RIGHT ANKLE PAIN: ICD-10-CM
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
## 2025-04-28 DIAGNOSIS — R07.81 RIB PAIN ON LEFT SIDE: ICD-10-CM

## 2025-04-28 DIAGNOSIS — Z12.31 BREAST CANCER SCREENING BY MAMMOGRAM: ICD-10-CM

## 2025-04-28 NOTE — PROGRESS NOTES
Chief Complaint  Fall (Left rib pain ; ankle pain )    History of Present Illness  Josette Carcamo is a 59 y.o. female who presents to [facility] with a past medical history of    Past Medical History:   Diagnosis Date    Allergic rhinitis     Anaphylaxis     Food allergies     Asthma     Breast mass, right     Carpal tunnel syndrome of right wrist 01/31/2019    HAD RIGHT CTR IN FEB 2019    Cervical radiculopathy 01/03/2019    Cervicalgia 01/03/2019    Chronic kidney disease     stage II    Dysphagia     Leg swelling     Liver lesion     Muscle cramps     Palpitations     HX OF REPORTS HAS SEEN DR MURPHY IN THE PAST BUT WAS RELEASED. DENIES CP/SOA. 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  She reports after going to her last class she was walking with a colleague across the street from the [employer] [place] in an attempt to reach her hotel and she suddenly fell.  She states that she did not trip, stumble, or even feel like she twisted her ankle, she just suddenly fell to the ground.  She ended up hurting her left ribs and left breast as well as the left knee.  She states that those were initially pretty sore and banged up.  She had a skin tear on her left hand.  When she got to her room she noticed that her right ankle was hurting.  She feels like when she fell that she may have twisted that ankle and popped it outwards while going down.  She states that it was initially very swollen and bruised, and  at present it may be approximately 30% better.  She has been elevating it and soaking it in Epsom salt baths.  She is able to wear a supportive tennis shoe but could not wear a dress shoe or sandal.  She continues to endorse rib pain on the left.    Patient does note that she is somewhat overdue for her mammogram.  She is not having any breast complaints.  No lumps, bumps, redness, nipple discharge or breast pain.  There is significant family history of breast cancer in her sister, maternal grandmother, maternal aunt, and another aunt.    Objective   Vital Signs:   Vitals:    04/28/25 0755   BP: 106/64   Temp: 97.1 °F (36.2 °C)   Weight: 66.2 kg (146 lb)   PainSc: 3    PainLoc: Rib Cage     Body mass index is 26.7 kg/m².    Wt Readings from Last 3 Encounters:   04/28/25 66.2 kg (146 lb)   02/01/25 68.5 kg (151 lb)   11/08/24 71.8 kg (158 lb 4.6 oz)     BP Readings from Last 3 Encounters:   04/28/25 106/64   02/01/25 124/76   11/08/24 104/72       Health Maintenance   Topic Date Due    ZOSTER VACCINE (1 of 2) Never done    ANNUAL PHYSICAL  06/14/2025    INFLUENZA VACCINE  07/01/2025    MAMMOGRAM  03/20/2026    COLORECTAL CANCER SCREENING  06/25/2029    TDAP/TD VACCINES (4 - Td or Tdap) 06/05/2033    HEPATITIS C SCREENING  Completed    Pneumococcal Vaccine 50+  Completed    Hepatitis B  Discontinued    COVID-19 Vaccine  Discontinued       Physical Exam  Vitals reviewed.   Constitutional:       General: She is not in acute distress.     Appearance: She is well-developed and overweight. She is not ill-appearing.   HENT:      Head: Normocephalic and atraumatic.   Eyes:      General: No scleral icterus.        Right eye: No discharge.         Left eye: No discharge.      Extraocular Movements: Extraocular movements intact.      Conjunctiva/sclera: Conjunctivae normal.   Pulmonary:      Effort: Pulmonary effort is normal.   Chest:      Comments: Left-sided chest wall pain inspiration.  Musculoskeletal:         General:  Normal range of motion.      Cervical back: Normal range of motion.      Right lower leg: No edema.      Left lower leg: No edema.      Right ankle: Swelling and ecchymosis present. No deformity. Tenderness present. Decreased range of motion.      Comments: Ecchymosis noted of the right ankle - and along the lateral aspect of the foot from the hind foot to the midfoot.    Skin:     General: Skin is warm and dry.   Neurological:      Mental Status: She is alert and oriented to person, place, and time.   Psychiatric:         Mood and Affect: Mood and affect normal.         Behavior: Behavior normal.         Thought Content: Thought content normal.         Judgment: Judgment normal.         Result Review :  The following data was reviewed by: Hellen Yao, APRN on 04/28/2025:    Orders Only on 04/22/2025   Component Date Value    HS Troponin T 04/22/2025 8     WBC 04/22/2025 5.68     RBC 04/22/2025 3.96     Hemoglobin 04/22/2025 12.5     Hematocrit 04/22/2025 37.3     MCV 04/22/2025 94.2     MCH 04/22/2025 31.6     MCHC 04/22/2025 33.5     RDW 04/22/2025 13.0     RDW-SD 04/22/2025 43.9     MPV 04/22/2025 10.4     Platelets 04/22/2025 400     Neutrophil % 04/22/2025 58.3     Lymphocyte % 04/22/2025 30.6     Monocyte % 04/22/2025 6.0     Eosinophil % 04/22/2025 4.6     Basophil % 04/22/2025 0.5     Immature Grans % 04/22/2025 0.0     Neutrophils, Absolute 04/22/2025 3.31     Lymphocytes, Absolute 04/22/2025 1.74     Monocytes, Absolute 04/22/2025 0.34     Eosinophils, Absolute 04/22/2025 0.26     Basophils, Absolute 04/22/2025 0.03     Immature Grans, Absolute 04/22/2025 0.00     nRBC 04/22/2025 0.0     Glucose 04/22/2025 93     BUN 04/22/2025 16     Creatinine 04/22/2025 1.11 (H)     Sodium 04/22/2025 139     Potassium 04/22/2025 4.7     Chloride 04/22/2025 102     CO2 04/22/2025 25.8     Calcium 04/22/2025 9.5     Total Protein 04/22/2025 6.9     Albumin 04/22/2025 4.4     ALT (SGPT) 04/22/2025 13     AST (SGOT)  04/22/2025 24     Alkaline Phosphatase 04/22/2025 93     Total Bilirubin 04/22/2025 0.2     Globulin 04/22/2025 2.5     A/G Ratio 04/22/2025 1.8     BUN/Creatinine Ratio 04/22/2025 14.4     Anion Gap 04/22/2025 11.2     eGFR 04/22/2025 56.7 (L)     Magnesium 04/22/2025 2.3     TSH 04/22/2025 1.770     Free T4 04/22/2025 1.48    Clinical Support on 04/21/2025   Component Date Value    Glucose 04/21/2025 96     BUN 04/21/2025 16     Creatinine 04/21/2025 1.08 (H)     Sodium 04/21/2025 136     Potassium 04/21/2025 4.3     Chloride 04/21/2025 102     CO2 04/21/2025 24.0     Calcium 04/21/2025 9.5     Total Protein 04/21/2025 7.0     Albumin 04/21/2025 4.4     ALT (SGPT) 04/21/2025 16     AST (SGOT) 04/21/2025 21     Alkaline Phosphatase 04/21/2025 105     Total Bilirubin 04/21/2025 0.2     Globulin 04/21/2025 2.6     A/G Ratio 04/21/2025 1.7     BUN/Creatinine Ratio 04/21/2025 14.8     Anion Gap 04/21/2025 10.0     eGFR 04/21/2025 58.6 (L)      XR Ankle 3+ View Right (In Office)  Result Date: 4/28/2025  Impression: Negative Electronically Signed: Scotty Saeid  4/28/2025 1:15 PM EDT  Workstation ID: [id number]    XR Ribs Left With PA Chest  Result Date: 4/28/2025  Impression: No acute rib fracture or acute cardiopulmonary process demonstrated Electronically Signed: Scotty Breaux  4/28/2025 1:14 PM EDT  Workstation ID: [id number]    CT Cardiac Calcium Score Without Dye  Result Date: 3/31/2025  Impression: Negative examination. No identifiable atherosclerotic plaque. Calcium Score Interpretation 0 -- Negative examination, no identifiable atherosclerotic plaque.  Very low risk of cardiac events in the next 5 years. 1-10 -- Minimal plaque burden.  Low risk of cardiac events in the next 5 years. 11- 100 -- Mild Plaque burden.  Mild risk of cardiac events in the next 5 years. 101 - 400 -- Moderate plaque burden.  Moderate risk of cardiac events in the next 5 years. Over 400 -- Extensive plaque burden.  High risk of cardiac events  in the next 5 years. Electronically Signed: Nahed Jarquin MD  3/31/2025 6:53 PM EDT  Workstation ID: [id number]    XR Hip With or Without Pelvis 2 - 3 View Right  Result Date: 2/4/2025  Impression: 1.No acute osseous abnormality of the right hip. No significant degenerative joint disease. 2.Degenerative facet arthropathy of the lower lumbar spine. Electronically Signed: Jose Gomez  2/4/2025 11:15 AM EST  Workstation ID: [id number]      SCANNED - COLONOSCOPY (06/25/2019)   Mammo Screening Digital Tomosynthesis Bilateral With CAD (03/20/2024 15:32)   DEXA Bone Density Axial (06/26/2024 15:20)     Procedures        Assessment and Plan   Diagnoses and all orders for this visit:    1. Fall on hard surface (Primary)  -     XR Ankle 3+ View Right (In Office)  -     XR Ribs Left With PA Chest    2. Acute right ankle pain  -     XR Ankle 3+ View Right (In Office)    3. Rib pain on left side  -     XR Ribs Left With PA Chest    4. Breast cancer screening by mammogram  -     Mammo Screening Digital Tomosynthesis Bilateral With CAD; Future                  FOLLOW UP  Return if symptoms worsen or fail to improve.    Patient advised that her imaging is unremarkable showing no acute fracture or dislocation.  Suspect musculoskeletal pain which she is currently managing with NSAIDs and Tylenol.  We discussed potential use of steroids as an anti-inflammatory; however, she usually reserves taking steroids for when she has an exacerbation of her asthma and therefore declines at this time.  She is also using topical therapies such as deep blue mentholated rub which has also been efficacious.  She will let me know if symptoms persist, worsen, or do not improve.    Order for mammogram placed.    Patient was given instructions and counseling regarding her condition or for health maintenance advice. Please see specific information pulled into the AVS if appropriate.       Hellen Yao, APRN  04/28/25  14:11 EDT    CURRENT &  DISCONTINUED MEDICATIONS  Current Outpatient Medications   Medication Instructions    cholecalciferol (VITAMIN D3) 25 MCG (1000 UT) tablet 1 tablet, Daily    Elafibranor (Iqirvo) 80 MG tablet Take 80 mg by mouth 1 (one) time each day.    EPINEPHrine (EPIPEN) 0.3 MG/0.3ML solution auto-injector injection 0.3 mL, As Needed    hydroCHLOROthiazide 12.5 mg, Oral, Daily    ipratropium-albuterol (DUO-NEB) 0.5-2.5 mg/3 ml nebulizer 3 mL, Nebulization, Every 4 Hours PRN    Lactobacillus (Probiotic Acidophilus) tablet 1 tablet, Daily    linaclotide (Linzess) 290 MCG capsule capsule Take 290 mcg (1 capsule)  by mouth 1 (one) time each day.    ondansetron (ZOFRAN) 4 mg, Oral, Every 8 Hours PRN    pantoprazole (PROTONIX) 20 MG EC tablet Take 1 tablet (20 mg total) by mouth 1 (one) time each day. Do not crush, chew, or split.    pantoprazole (PROTONIX) 40 mg, Oral, Daily, Do not crush, chew, or split.    spironolactone (ALDACTONE) 50 MG tablet Take 1 tablet (50 mg total) by mouth 1 (one) time each day.    sulfamethoxazole-trimethoprim (BACTRIM DS,SEPTRA DS) 800-160 MG per tablet 1 tablet, As Needed    ursodiol (ACTIGALL) 500 MG tablet Take 1 tablet (500 mg total) by mouth 4 (four) times a day.    valACYclovir (VALTREX) 1,000 mg, Oral, Daily    Ventolin  (90 Base) MCG/ACT inhaler Inhale 1-2 puffs 4 times a day As Needed for Wheezing.    vitamin E 400 UNIT capsule 1 capsule, Daily       There are no discontinued medications.

## 2025-05-07 ENCOUNTER — CLINICAL SUPPORT (OUTPATIENT)
Dept: FAMILY MEDICINE CLINIC | Facility: CLINIC | Age: 60
End: 2025-05-07
Payer: COMMERCIAL

## 2025-05-07 DIAGNOSIS — N28.9 RENAL INSUFFICIENCY: Primary | ICD-10-CM

## 2025-05-07 LAB
ANION GAP SERPL CALCULATED.3IONS-SCNC: 11 MMOL/L (ref 5–15)
BUN SERPL-MCNC: 19 MG/DL (ref 8–23)
BUN/CREAT SERPL: 19.8 (ref 7–25)
CALCIUM SPEC-SCNC: 9.1 MG/DL (ref 8.6–10.5)
CHLORIDE SERPL-SCNC: 103 MMOL/L (ref 98–107)
CO2 SERPL-SCNC: 25 MMOL/L (ref 22–29)
CREAT SERPL-MCNC: 0.96 MG/DL (ref 0.57–1)
EGFRCR SERPLBLD CKD-EPI 2021: 67.5 ML/MIN/1.73
GLUCOSE SERPL-MCNC: 86 MG/DL (ref 65–99)
POTASSIUM SERPL-SCNC: 4.3 MMOL/L (ref 3.5–5.2)
SODIUM SERPL-SCNC: 139 MMOL/L (ref 136–145)

## 2025-05-07 PROCEDURE — 36415 COLL VENOUS BLD VENIPUNCTURE: CPT | Performed by: FAMILY MEDICINE

## 2025-05-07 PROCEDURE — 80048 BASIC METABOLIC PNL TOTAL CA: CPT | Performed by: FAMILY MEDICINE

## 2025-05-07 NOTE — PROGRESS NOTES
.  Venipuncture Blood Specimen Collection  Venipuncture performed in left arm by Marine Mcdonnell with good hemostasis. Patient tolerated the procedure well without complications.   05/07/25   Nisha Reynoso

## 2025-05-12 ENCOUNTER — APPOINTMENT (OUTPATIENT)
Dept: GENERAL RADIOLOGY | Facility: HOSPITAL | Age: 60
End: 2025-05-12
Payer: COMMERCIAL

## 2025-05-12 ENCOUNTER — HOSPITAL ENCOUNTER (EMERGENCY)
Facility: HOSPITAL | Age: 60
Discharge: HOME | End: 2025-05-12
Attending: EMERGENCY MEDICINE | Admitting: EMERGENCY MEDICINE
Payer: COMMERCIAL

## 2025-05-12 VITALS
WEIGHT: 142 LBS | RESPIRATION RATE: 18 BRPM | SYSTOLIC BLOOD PRESSURE: 113 MMHG | DIASTOLIC BLOOD PRESSURE: 58 MMHG | OXYGEN SATURATION: 98 % | HEART RATE: 81 BPM | BODY MASS INDEX: 26.13 KG/M2 | HEIGHT: 62 IN | TEMPERATURE: 98.3 F

## 2025-05-12 DIAGNOSIS — R00.2 PALPITATIONS: Primary | ICD-10-CM

## 2025-05-12 LAB
ALBUMIN SERPL-MCNC: 4.3 G/DL (ref 3.5–5.2)
ALBUMIN/GLOB SERPL: 1.7 G/DL
ALP SERPL-CCNC: 99 U/L (ref 39–117)
ALT SERPL W P-5'-P-CCNC: 13 U/L (ref 1–33)
ANION GAP SERPL CALCULATED.3IONS-SCNC: 10.8 MMOL/L (ref 5–15)
AST SERPL-CCNC: 20 U/L (ref 1–32)
BASOPHILS # BLD AUTO: 0.02 10*3/MM3 (ref 0–0.2)
BASOPHILS NFR BLD AUTO: 0.4 % (ref 0–1.5)
BILIRUB SERPL-MCNC: 0.2 MG/DL (ref 0–1.2)
BUN SERPL-MCNC: 21 MG/DL (ref 8–23)
BUN/CREAT SERPL: 20.4 (ref 7–25)
CALCIUM SPEC-SCNC: 9.1 MG/DL (ref 8.6–10.5)
CHLORIDE SERPL-SCNC: 103 MMOL/L (ref 98–107)
CO2 SERPL-SCNC: 23.2 MMOL/L (ref 22–29)
CREAT SERPL-MCNC: 1.03 MG/DL (ref 0.57–1)
DEPRECATED RDW RBC AUTO: 45 FL (ref 37–54)
EGFRCR SERPLBLD CKD-EPI 2021: 62 ML/MIN/1.73
EOSINOPHIL # BLD AUTO: 0.23 10*3/MM3 (ref 0–0.4)
EOSINOPHIL NFR BLD AUTO: 4.4 % (ref 0.3–6.2)
ERYTHROCYTE [DISTWIDTH] IN BLOOD BY AUTOMATED COUNT: 13 % (ref 12.3–15.4)
GLOBULIN UR ELPH-MCNC: 2.5 GM/DL
GLUCOSE SERPL-MCNC: 135 MG/DL (ref 65–99)
HCT VFR BLD AUTO: 34.7 % (ref 34–46.6)
HGB BLD-MCNC: 11.8 G/DL (ref 12–15.9)
HOLD SPECIMEN: NORMAL
HOLD SPECIMEN: NORMAL
IMM GRANULOCYTES # BLD AUTO: 0.01 10*3/MM3 (ref 0–0.05)
IMM GRANULOCYTES NFR BLD AUTO: 0.2 % (ref 0–0.5)
LYMPHOCYTES # BLD AUTO: 2.04 10*3/MM3 (ref 0.7–3.1)
LYMPHOCYTES NFR BLD AUTO: 38.7 % (ref 19.6–45.3)
MAGNESIUM SERPL-MCNC: 2.2 MG/DL (ref 1.6–2.4)
MCH RBC QN AUTO: 32.1 PG (ref 26.6–33)
MCHC RBC AUTO-ENTMCNC: 34 G/DL (ref 31.5–35.7)
MCV RBC AUTO: 94.3 FL (ref 79–97)
MONOCYTES # BLD AUTO: 0.25 10*3/MM3 (ref 0.1–0.9)
MONOCYTES NFR BLD AUTO: 4.7 % (ref 5–12)
NEUTROPHILS NFR BLD AUTO: 2.72 10*3/MM3 (ref 1.7–7)
NEUTROPHILS NFR BLD AUTO: 51.6 % (ref 42.7–76)
NRBC BLD AUTO-RTO: 0 /100 WBC (ref 0–0.2)
PLATELET # BLD AUTO: 350 10*3/MM3 (ref 140–450)
PMV BLD AUTO: 9.5 FL (ref 6–12)
POTASSIUM SERPL-SCNC: 4 MMOL/L (ref 3.5–5.2)
PROT SERPL-MCNC: 6.8 G/DL (ref 6–8.5)
QT INTERVAL: 346 MS
QTC INTERVAL: 426 MS
RBC # BLD AUTO: 3.68 10*6/MM3 (ref 3.77–5.28)
SODIUM SERPL-SCNC: 137 MMOL/L (ref 136–145)
TROPONIN T SERPL HS-MCNC: <6 NG/L
TSH SERPL DL<=0.05 MIU/L-ACNC: 1.8 UIU/ML (ref 0.27–4.2)
WBC NRBC COR # BLD AUTO: 5.27 10*3/MM3 (ref 3.4–10.8)
WHOLE BLOOD HOLD COAG: NORMAL
WHOLE BLOOD HOLD SPECIMEN: NORMAL

## 2025-05-12 PROCEDURE — 84484 ASSAY OF TROPONIN QUANT: CPT

## 2025-05-12 PROCEDURE — 99284 EMERGENCY DEPT VISIT MOD MDM: CPT

## 2025-05-12 PROCEDURE — 93005 ELECTROCARDIOGRAM TRACING: CPT | Performed by: EMERGENCY MEDICINE

## 2025-05-12 PROCEDURE — 80050 GENERAL HEALTH PANEL: CPT

## 2025-05-12 PROCEDURE — 36415 COLL VENOUS BLD VENIPUNCTURE: CPT

## 2025-05-12 PROCEDURE — 71045 X-RAY EXAM CHEST 1 VIEW: CPT

## 2025-05-12 PROCEDURE — 93005 ELECTROCARDIOGRAM TRACING: CPT

## 2025-05-12 PROCEDURE — 83735 ASSAY OF MAGNESIUM: CPT

## 2025-05-12 RX ORDER — SODIUM CHLORIDE 0.9 % (FLUSH) 0.9 %
10 SYRINGE (ML) INJECTION AS NEEDED
Status: DISCONTINUED | OUTPATIENT
Start: 2025-05-12 | End: 2025-05-12 | Stop reason: HOSPADM

## 2025-05-12 NOTE — ED PROVIDER NOTES
Time: 6:42 PM EDT  Date of encounter:  5/12/2025  Independent Historian/Clinical History and Information was obtained by:   Patient    History is limited by: N/A    Chief Complaint   Patient presents with    Rapid Heart Rate    Chest Pain    Dizziness         History of Present Illness:  Patient is a 59 y.o. year old female who presents to the emergency department for evaluation of  Palpitations.  Patient states she started feeling her heart race.  Heart rate went up to 180.  Report associated dizziness and chest discomfort.  When she arrived in the emergency department she squatted down and felt her heart go back to normal rhythm.  She has had a previous episode similar to this.  No official diagnosis.  She is scheduled to see her cardiologist next week.  Denies any other symptoms.    Patient Care Team  Primary Care Provider: Hellen Yao APRN    Past Medical History:     Allergies   Allergen Reactions    Gluten Meal Anaphylaxis    Latex Shortness Of Breath and Swelling    Other Anaphylaxis     DAIRY PRODUCTS    Peanut-Containing Drug Products Anaphylaxis    Cephalexin Hives    Clindamycin Hcl Hives    Erythromycin Hives    Macrobid [Nitrofurantoin] Hives    Penicillins Hives     Beta lactam allergy details  Antibiotic reaction: hives  Age at reaction: child  Dose to reaction time: days  Reason for antibiotic: unknown  Epinephrine required for reaction?: no  Tolerated antibiotics: other (bactrim, cipro)        Past Medical History:   Diagnosis Date    Allergic rhinitis     Anaphylaxis     Food allergies     Asthma     Breast mass, right     Carpal tunnel syndrome of right wrist 01/31/2019    HAD RIGHT CTR IN FEB 2019    Cervical radiculopathy 01/03/2019    Cervicalgia 01/03/2019    Chronic kidney disease     stage II    Dysphagia     Leg swelling     Liver lesion     Muscle cramps     Palpitations     HX OF REPORTS HAS SEEN DR MURPHY IN THE PAST BUT WAS RELEASED. DENIES CP/SOA. ACTIVE    PONV (postoperative  nausea and vomiting)     Presbylarynx     Primary biliary cirrhosis     CURRENT LABS WNL NO ISSUES CURRENTLY ON ACTIGALL    Thyroid nodule     BEING MONITORED    Ulnar neuropathy at elbow 02/21/2019    HAD ULNAR DECOMPRESSION AND CARPAL TUNNEL DONE FEB 2019    Voice hoarseness      Past Surgical History:   Procedure Laterality Date    BLADDER SUSPENSION      Age 32    BREAST LUMPECTOMY Right 7/26/2024    Procedure: Excision of right breast mass;  Surgeon: Jayla Lancaster MD;  [facility]: [facility];  Service: General;  Laterality: Right;    CARPAL TUNNEL RELEASE  02/08/2019    Right CTR & right ulnar nerve decompression    CHOLECYSTECTOMY      DILATATION AND CURETTAGE      Multiple     HYSTERECTOMY      Partial    LASER RESURFACING      Yanet Sandrita    TONSILLECTOMY      Age 14    TUBAL ABDOMINAL LIGATION      Age 27    ULNAR NERVE DECOMPRESSION Right 02/08/2019     Family History   Problem Relation Age of Onset    Cervical cancer Mother     Lung cancer Mother     Arthritis Mother     Heart disease Mother     Diabetes Mother     Osteoporosis Mother     Rheum arthritis Father     Lung cancer Father     Other Father         Bladder cancer     Arthritis Father     Stroke Father     Heart disease Father     Diabetes Father     Cervical cancer Sister     Breast cancer Sister     Arthritis Sister     Stroke Sister     Rheum arthritis Sister     Breast cancer Maternal Grandmother     Rheum arthritis Paternal Grandmother     Breast cancer Maternal Aunt     Colon cancer Other     Melanoma Other     Breast cancer Other        Home Medications:  Prior to Admission medications    Medication Sig Start Date End Date Taking? Authorizing Provider   cholecalciferol (VITAMIN D3) 25 MCG (1000 UT) tablet Take 1 tablet by mouth Daily. 6/20/15   Provider, MD Kristan   Elafibranor (Iqirvo) 80 MG tablet Take 80 mg by mouth 1 (one) time each day. 2/4/25      EPINEPHrine (EPIPEN) 0.3 MG/0.3ML solution auto-injector injection Inject  0.3 mL under the skin into the appropriate area as directed As Needed. 10/1/03   Provider, MD Kristan   hydroCHLOROthiazide 12.5 MG tablet Take 1 tablet by mouth Daily.  Patient taking differently: Take 1 tablet by mouth Daily As Needed (SWELLING IN LEGS). 1/25/24   Nicholas Doherty MD   ipratropium-albuterol (DUO-NEB) 0.5-2.5 mg/3 ml nebulizer Take 3 mL by nebulization Every 4 (Four) Hours As Needed for Shortness of Air or Wheezing. 11/12/23   Tanya Michel APRN   Lactobacillus (Probiotic Acidophilus) tablet Take 1 tablet by mouth Daily.    Provider, MD Kristan   linaclotide (Linzess) 290 MCG capsule capsule Take 290 mcg (1 capsule)  by mouth 1 (one) time each day. 9/10/24      ondansetron (Zofran) 4 MG tablet Take 1 tablet by mouth Every 8 (Eight) Hours As Needed for Nausea. 6/14/24   Hellen Yao APRN   pantoprazole (PROTONIX) 20 MG EC tablet Take 1 tablet (20 mg total) by mouth 1 (one) time each day. Do not crush, chew, or split. 9/13/24      pantoprazole (PROTONIX) 40 MG EC tablet Take 1 tablet by mouth Daily. Do not crush, chew, or split.  Patient taking differently: Take 1 tablet by mouth Every Night. Do not crush, chew, or split. 6/26/24   Hellen Yao APRN   spironolactone (ALDACTONE) 50 MG tablet Take 1 tablet (50 mg total) by mouth 1 (one) time each day. 9/13/24      sulfamethoxazole-trimethoprim (BACTRIM DS,SEPTRA DS) 800-160 MG per tablet Take 1 tablet by mouth As Needed (UTI prophylaxis). Indications: PROPHYLAXIS    Provider, MD Kristan   ursodiol (ACTIGALL) 500 MG tablet Take 1 tablet (500 mg total) by mouth 4 (four) times a day. 2/4/25      valACYclovir (VALTREX) 1000 MG tablet Take 1 tablet by mouth Daily. 7/8/24   Hellen Yoa APRN   Ventolin  (90 Base) MCG/ACT inhaler Inhale 1-2 puffs 4 times a day As Needed for Wheezing. 10/25/22   Nicholas Doherty MD   vitamin E 400 UNIT capsule Take 1 capsule by mouth Daily.    Provider, Historical,  "MD        Social History:   Social History     Tobacco Use    Smoking status: Never    Smokeless tobacco: Never   Vaping Use    Vaping status: Never Used   Substance Use Topics    Alcohol use: Never    Drug use: Never         Review of Systems:  Review of Systems   Constitutional:  Negative for chills and fever.   HENT:  Negative for congestion, rhinorrhea and sore throat.    Eyes:  Negative for pain and visual disturbance.   Respiratory:  Negative for apnea, cough and chest tightness.    Cardiovascular:  Positive for chest pain and palpitations.   Gastrointestinal:  Negative for abdominal pain, diarrhea, nausea and vomiting.   Genitourinary:  Negative for difficulty urinating and dysuria.   Musculoskeletal:  Negative for joint swelling and myalgias.   Skin:  Negative for color change.   Neurological:  Positive for dizziness. Negative for seizures and headaches.   Psychiatric/Behavioral: Negative.     All other systems reviewed and are negative.       Physical Exam:  /58 (BP Location: Right arm, Patient Position: Lying)   Pulse 76   Temp 97.9 °F (36.6 °C) (Oral)   Resp 20   Ht 157.5 cm (62\")   Wt 64.4 kg (142 lb)   SpO2 100%   BMI 25.97 kg/m²         Physical Exam  Vitals and nursing note reviewed.   Constitutional:       General: She is not in acute distress.     Appearance: Normal appearance. She is not toxic-appearing.   HENT:      Head: Normocephalic and atraumatic.      Jaw: There is normal jaw occlusion.      Nose: Nose normal.      Mouth/Throat:      Mouth: Mucous membranes are moist.   Eyes:      General: Lids are normal.      Extraocular Movements: Extraocular movements intact.      Conjunctiva/sclera: Conjunctivae normal.      Pupils: Pupils are equal, round, and reactive to light.   Cardiovascular:      Rate and Rhythm: Normal rate and regular rhythm.      Pulses: Normal pulses.      Heart sounds: Normal heart sounds.   Pulmonary:      Effort: Pulmonary effort is normal. No respiratory " distress.      Breath sounds: Normal breath sounds. No wheezing or rhonchi.   Abdominal:      General: Abdomen is flat. There is no distension.      Palpations: Abdomen is soft.      Tenderness: There is no abdominal tenderness. There is no guarding or rebound.   Musculoskeletal:         General: Normal range of motion.      Cervical back: Normal range of motion and neck supple.      Right lower leg: No edema.      Left lower leg: No edema.   Skin:     General: Skin is warm and dry.      Capillary Refill: Capillary refill takes less than 2 seconds.   Neurological:      General: No focal deficit present.      Mental Status: She is alert and oriented to person, place, and time. Mental status is at baseline.   Psychiatric:         Mood and Affect: Mood normal.         Behavior: Behavior normal.                            Medical Decision Making:      Comorbidities that affect care:    Primary biliary cirrhosis, Asthma    External Notes reviewed:    Previous Clinic Note: Patient was seen by her PCP on 4/20/2025 for fall on hard surface, acute right ankle pain, rib pain on left side, breast cancer screening.      The following orders were placed and all results were independently analyzed by me:  Orders Placed This Encounter   Procedures    XR Chest 1 View    [place] Draw    Comprehensive Metabolic Panel    Magnesium    High Sensitivity Troponin T    TSH Rfx On Abnormal To Free T4    CBC Auto Differential    NPO Diet NPO Type: Strict NPO    Undress & Gown    Continuous Pulse Oximetry    Oxygen Therapy- Nasal Cannula; Titrate 1-6 LPM Per SpO2; 90 - 95%    ECG 12 Lead ED Triage Standing Order; Dysrhythmia    Insert Peripheral IV    CBC & Differential    Green Top (Gel)    Lavender Top    Gold Top - SST    Light Blue Top       Medications Given in the Emergency Department:  Medications   sodium chloride 0.9 % flush 10 mL (has no administration in time range)        ED Course:    The patient was initially evaluated in the  triage area where orders were placed. The patient was later dispositioned by Don Day MD.      The patient was advised to stay for completion of workup which includes but is not limited to communication of labs and radiological results, reassessment and plan. The patient was advised that leaving prior to disposition by a provider could result in critical findings that are not communicated to the patient.     ED Course as of 05/12/25 2140   Mon May 12, 2025   2138 ECG 12 Lead ED Triage Standing Order; Dysrhythmia  Normal sinus rhythm with rate of 91. QRS normal. MT interval normal. QTc interval is normal. No ST elevation or depression. No T wave abnormalities. This EKG was interpreted by me.  [LD]      ED Course User Index  [LD] Don Day MD       Labs:    Lab Results (last 24 hours)       Procedure Component Value Units Date/Time    CBC & Differential [[id number]]  (Abnormal) Collected: 05/12/25 1831    Specimen: Blood from Arm, Right Updated: 05/12/25 1837    Narrative:      The following orders were created for panel order CBC & Differential.  Procedure                               Abnormality         Status                     ---------                               -----------         ------                     CBC Auto Differential[[id number]]        Abnormal            Final result                 Please view results for these tests on the individual orders.    Comprehensive Metabolic Panel [[id number]]  (Abnormal) Collected: 05/12/25 1831    Specimen: Blood from Arm, Right Updated: 05/12/25 1859     Glucose 135 mg/dL      BUN 21 mg/dL      Creatinine 1.03 mg/dL      Sodium 137 mmol/L      Potassium 4.0 mmol/L      Chloride 103 mmol/L      CO2 23.2 mmol/L      Calcium 9.1 mg/dL      Total Protein 6.8 g/dL      Albumin 4.3 g/dL      ALT (SGPT) 13 U/L      AST (SGOT) 20 U/L      Alkaline Phosphatase 99 U/L      Total Bilirubin 0.2 mg/dL      Globulin 2.5 gm/dL      A/G Ratio 1.7 g/dL       BUN/Creatinine Ratio 20.4     Anion Gap 10.8 mmol/L      eGFR 62.0 mL/min/1.73     Narrative:      GFR Categories in Chronic Kidney Disease (CKD)              GFR Category          GFR (mL/min/1.73)    Interpretation  G1                    90 or greater        Normal or high (1)  G2                    60-89                Mild decrease (1)  G3a                   45-59                Mild to moderate decrease  G3b                   30-44                Moderate to severe decrease  G4                    15-29                Severe decrease  G5                    14 or less           Kidney failure    (1)In the absence of evidence of kidney disease, neither GFR category G1 or G2 fulfill the criteria for CKD.    eGFR calculation 2021 CKD-EPI creatinine equation, which does not include race as a factor    Magnesium [[id number]]  (Normal) Collected: 05/12/25 1831    Specimen: Blood from Arm, Right Updated: 05/12/25 1859     Magnesium 2.2 mg/dL     High Sensitivity Troponin T [[id number]]  (Normal) Collected: 05/12/25 1831    Specimen: Blood from Arm, Right Updated: 05/12/25 1904     HS Troponin T <6 ng/L     Narrative:      High Sensitive Troponin T Reference Range:  <14.0 ng/L- Negative Female for AMI  <22.0 ng/L- Negative Male for AMI  >=14 - Abnormal Female indicating possible myocardial injury.  >=22 - Abnormal Male indicating possible myocardial injury.   Clinicians would have to utilize clinical acumen, EKG, Troponin, and serial changes to determine if it is an Acute Myocardial Infarction or myocardial injury due to an underlying chronic condition.         TSH Rfx On Abnormal To Free T4 [[id number]]  (Normal) Collected: 05/12/25 1831    Specimen: Blood from Arm, Right Updated: 05/12/25 1904     TSH 1.800 uIU/mL     CBC Auto Differential [[id number]]  (Abnormal) Collected: 05/12/25 1831    Specimen: Blood from Arm, Right Updated: 05/12/25 1837     WBC 5.27 10*3/mm3      RBC 3.68 10*6/mm3      Hemoglobin 11.8 g/dL       Hematocrit 34.7 %      MCV 94.3 fL      MCH 32.1 pg      MCHC 34.0 g/dL      RDW 13.0 %      RDW-SD 45.0 fl      MPV 9.5 fL      Platelets 350 10*3/mm3      Neutrophil % 51.6 %      Lymphocyte % 38.7 %      Monocyte % 4.7 %      Eosinophil % 4.4 %      Basophil % 0.4 %      Immature Grans % 0.2 %      Neutrophils, Absolute 2.72 10*3/mm3      Lymphocytes, Absolute 2.04 10*3/mm3      Monocytes, Absolute 0.25 10*3/mm3      Eosinophils, Absolute 0.23 10*3/mm3      Basophils, Absolute 0.02 10*3/mm3      Immature Grans, Absolute 0.01 10*3/mm3      nRBC 0.0 /100 WBC              Imaging:    XR Chest 1 View  Result Date: 5/12/2025  XR CHEST 1 VW Date of Exam: 5/12/2025 6:52 PM EDT Indication: Dysrhythmia Triage Protocol Comparison: 4/20/2025 Findings: The cardiomediastinal silhouette is within normal limits. Lungs are clear. No focal consolidation, pneumothorax, or significant pleural effusion. Osseous structures grossly intact.     Impression: No acute process. Electronically Signed: Lamine Li MD  5/12/2025 7:29 PM EDT  Workstation ID: [id number]        Differential Diagnosis and Discussion:      Palpitations: Differential diagnosis includes but is not limited to anxiety, atrioventricular blocks, mitral valve disease, hypoxia, coronary artery disease, hypokalemia, anemia, fever, COPD, congestive heart failure, pericarditis, Jas-Parkinson-White syndrome, pulmonary embolism, SVT, atrial fibrillation, atrial flutter, sinus tachycardia, thyrotoxicosis, and pheochromocytoma.    PROCEDURES:    Labs were collected in the emergency department and all labs were reviewed and interpreted by me.  X-ray were performed in the emergency department and all X-ray impressions were independently interpreted by me.  An EKG was performed and the EKG was interpreted by me.    ECG 12 Lead ED Triage Standing Order; Dysrhythmia   Preliminary Result   HEART RATE=91  bpm   RR Tyjnzeca=950  ms   PA Rktpugdl=742  ms   P Horizontal Axis=4  deg   P  Front Axis=81  deg   QRSD Interval=93  ms   QT Asnqqrgm=658  ms   SYcX=038  ms   QRS Axis=2  deg   T Wave Axis=60  deg   - NORMAL ECG -   Sinus rhythm   Date and Time of Study:2025-05-12 18:19:56           Procedures    MDM  Number of Diagnoses or Management Options  Palpitations  Diagnosis management comments: Patient is afebrile nontoxic-appearing.  On arrival heart rate within normal range.  EKG shows sinus rhythm.  Description of episodes sound suspicious for SVT.  Recommend that she discuss Holter monitor with either primary care provider or cardiologist.  Patient's blood pressure is low normal at this time would not want to start her on a beta-blocker.  No episodes while in the emergency department.  Recommend follow-up with her primary care provider and cardiologist.  Discussed return precautions, discharge instructions and answered all her questions.       Amount and/or Complexity of Data Reviewed  Clinical lab tests: reviewed  Tests in the radiology section of CPT®: reviewed  Review and summarize past medical records: yes  Independent visualization of images, tracings, or specimens: yes    Risk of Complications, Morbidity, and/or Mortality  Presenting problems: moderate  Management options: moderate                     Patient Care Considerations:    SEPSIS was considered but is NOT present in the emergency department as SIRS criteria is not present.      Consultants/Shared Management Plan:    None    Social Determinants of Health:    Patient is independent, reliable, and has access to care.       Disposition and Care Coordination:    Discharged: The patient is suitable and stable for discharge with no need for consideration of admission.    I have explained the patient´s condition, diagnoses and treatment plan based on the information available to me at this time. I have answered questions and addressed any concerns. The patient has a good  understanding of the patient´s diagnosis, condition, and treatment  plan as can be expected at this point. The vital signs have been stable. The patient´s condition is stable and appropriate for discharge from the emergency department.      The patient will pursue further outpatient evaluation with the primary care physician or other designated or consulting physician as outlined in the discharge instructions. They are agreeable to this plan of care and follow-up instructions have been explained in detail. The patient has received these instructions in written format and has expressed an understanding of the discharge instructions. The patient is aware that any significant change in condition or worsening of symptoms should prompt an immediate return to this or the closest emergency department or call to 911.  I have explained discharge medications and the need for follow up with the patient/caretakers. This was also printed in the discharge instructions. Patient was discharged with the following medications and follow up:      Medication List        Changed      hydroCHLOROthiazide 12.5 MG tablet  Take 1 tablet by mouth Daily.  What changed:   when to take this  reasons to take this     * pantoprazole 40 MG EC tablet  Commonly known as: PROTONIX  Take 1 tablet by mouth Daily. Do not crush, chew, or split.  What changed: when to take this     * pantoprazole 20 MG EC tablet  Commonly known as: PROTONIX  Take 1 tablet (20 mg total) by mouth 1 (one) time each day. Do not crush, chew, or split.  What changed: Another medication with the same name was changed. Make sure you understand how and when to take each.           * This list has 2 medication(s) that are the same as other medications prescribed for you. Read the directions carefully, and ask your doctor or other care provider to review them with you.               Hellen Yao, APRN  [address] [place] [address] [phone]    In 2 days      Miguel Zaragoza MD  [address] [place] [address]  [id number]  [phone]      follow up as scheduled       Final diagnoses:   Palpitations        ED Disposition       ED Disposition   Discharge    Condition   Stable    Comment   --               This medical record created using voice recognition software.             Don Day MD  05/12/25 [id number]

## 2025-05-13 DIAGNOSIS — R00.2 PALPITATIONS: Primary | ICD-10-CM

## 2025-05-23 ENCOUNTER — RESULTS FOLLOW-UP (OUTPATIENT)
Dept: CARDIOLOGY | Facility: CLINIC | Age: 60
End: 2025-05-23
Payer: COMMERCIAL

## 2025-05-23 NOTE — TELEPHONE ENCOUNTER
YARIEL patient regarding results and recommendations. Voiced understanding.   Patient to discuss with Dr Zaragoza next week.

## 2025-05-27 NOTE — PROGRESS NOTES
Chief Complaint  Establish Care, Palpitations, Dizziness, and Rapid Heart Rate      History of Present Illness  Josette Carcamo presents to [facility] CARDIOLOGY  Patient is a 59-year-old with previous PSVT and primary biliary cirrhosis who had an episode while driving with tachycardia she felt rate in the 140s to 60s she tried to terminate this with coffee other vagal neighbors which was not successful she had some associated shortness of breath and mild chest tightness with this.  When she was in the waiting room was triage back quickly however she symptomatically converted before she could be hooked up.  The episode did not last for half hour to hour she reports no recurrent spells like that since then.      Past Medical History:   Diagnosis Date    Allergic rhinitis     Anaphylaxis     Food allergies     Asthma     Breast mass, right     Carpal tunnel syndrome of right wrist 01/31/2019    HAD RIGHT CTR IN FEB 2019    Cervical radiculopathy 01/03/2019    Cervicalgia 01/03/2019    Chronic kidney disease     stage II    Dysphagia     Leg swelling     Liver lesion     Muscle cramps     Palpitations     HX OF REPORTS HAS SEEN DR MURPHY IN THE PAST BUT WAS RELEASED. DENIES CP/SOA. ACTIVE    PONV (postoperative nausea and vomiting)     Presbylarynx     Primary biliary cirrhosis     CURRENT LABS WNL NO ISSUES CURRENTLY ON ACTIGALL    Thyroid nodule     BEING MONITORED    Ulnar neuropathy at elbow 02/21/2019    HAD ULNAR DECOMPRESSION AND CARPAL TUNNEL DONE FEB 2019    Voice hoarseness          Current Outpatient Medications:     cholecalciferol (VITAMIN D3) 25 MCG (1000 UT) tablet, Take 1 tablet by mouth Daily., Disp: , Rfl:     Elafibranor (Iqirvo) 80 MG tablet, Take 80 mg by mouth 1 (one) time each day., Disp: 90 tablet, Rfl: 2    EPINEPHrine (EPIPEN) 0.3 MG/0.3ML solution auto-injector injection, Inject 0.3 mL under the skin into the appropriate area as directed As Needed., Disp: , Rfl:      hydroCHLOROthiazide 12.5 MG tablet, Take 1 tablet by mouth Daily. (Patient taking differently: Take 1 tablet by mouth Daily As Needed (SWELLING IN LEGS).), Disp: 90 tablet, Rfl: 1    ipratropium-albuterol (DUO-NEB) 0.5-2.5 mg/3 ml nebulizer, Take 3 mL by nebulization Every 4 (Four) Hours As Needed for Shortness of Air or Wheezing., Disp: 360 mL, Rfl: 0    Lactobacillus (Probiotic Acidophilus) tablet, Take 1 tablet by mouth Daily., Disp: , Rfl:     linaclotide (Linzess) 290 MCG capsule capsule, Take 290 mcg (1 capsule)  by mouth 1 (one) time each day., Disp: 90 capsule, Rfl: 5    ondansetron (Zofran) 4 MG tablet, Take 1 tablet by mouth Every 8 (Eight) Hours As Needed for Nausea., Disp: 30 tablet, Rfl: 0    pantoprazole (PROTONIX) 20 MG EC tablet, Take 1 tablet (20 mg total) by mouth 1 (one) time each day. Do not crush, chew, or split., Disp: 90 tablet, Rfl: 3    pantoprazole (PROTONIX) 40 MG EC tablet, Take 1 tablet by mouth Daily. Do not crush, chew, or split. (Patient taking differently: Take 1 tablet by mouth Every Night. Do not crush, chew, or split.), Disp: 90 tablet, Rfl: 0    spironolactone (ALDACTONE) 50 MG tablet, Take 1 tablet (50 mg total) by mouth 1 (one) time each day., Disp: 90 tablet, Rfl: 3    sulfamethoxazole-trimethoprim (BACTRIM DS,SEPTRA DS) 800-160 MG per tablet, Take 1 tablet by mouth As Needed (UTI prophylaxis). Indications: PROPHYLAXIS, Disp: , Rfl:     ursodiol (ACTIGALL) 500 MG tablet, Take 1 tablet by mouth 4 (Four) Times a Day., Disp: 360 tablet, Rfl: 5    valACYclovir (VALTREX) 1000 MG tablet, Take 1 tablet by mouth Daily., Disp: 90 tablet, Rfl: 3    Ventolin  (90 Base) MCG/ACT inhaler, Inhale 1-2 puffs 4 times a day As Needed for Wheezing., Disp: 54 g, Rfl: 3    vitamin E 400 UNIT capsule, Take 1 capsule by mouth Daily., Disp: , Rfl:     There are no discontinued medications.  Allergies   Allergen Reactions    Gluten Meal Anaphylaxis    Latex Shortness Of Breath and Swelling     "Other Anaphylaxis     DAIRY PRODUCTS    Peanut-Containing Drug Products Anaphylaxis    Cephalexin Hives    Clindamycin Hcl Hives    Erythromycin Hives    Macrobid [Nitrofurantoin] Hives    Penicillins Hives     Beta lactam allergy details  Antibiotic reaction: hives  Age at reaction: child  Dose to reaction time: days  Reason for antibiotic: unknown  Epinephrine required for reaction?: no  Tolerated antibiotics: other (bactrim, cipro)           Social History     Tobacco Use    Smoking status: Never    Smokeless tobacco: Never   Vaping Use    Vaping status: Never Used   Substance Use Topics    Alcohol use: Never    Drug use: Never       Family History   Problem Relation Age of Onset    Cervical cancer Mother     Lung cancer Mother     Arthritis Mother     Heart disease Mother     Diabetes Mother     Osteoporosis Mother     Rheum arthritis Father     Lung cancer Father     Other Father         Bladder cancer     Arthritis Father     Stroke Father     Heart disease Father     Diabetes Father     Cervical cancer Sister     Breast cancer Sister     Arthritis Sister     Stroke Sister     Rheum arthritis Sister     Breast cancer Maternal Grandmother     Rheum arthritis Paternal Grandmother     Breast cancer Maternal Aunt     Colon cancer Other     Melanoma Other     Breast cancer Other         Objective     /50   Pulse 77   Ht 157.5 cm (62\")   Wt 64.4 kg (142 lb)   BMI 25.97 kg/m²       Physical Exam    General Appearance:   no acute distress  Alert and oriented x3  HENT:   lips not cyanotic  Atraumatic  Neck:  No jvd   supple  Respiratory:  no respiratory distress  normal breath sounds  no rales  Cardiovascular:  Regular rate and rhythm  no S3, no S4   no murmur  no rub  Extremities  No cyanosis  lower extremity edema: none    Skin:   warm, dry  No rashes    Result Review :     proBNP   Date Value Ref Range Status   11/22/2021 28.2 0.0 - 900.0 pg/mL Final     CMP          4/22/2025    14:04 5/7/2025    16:06 " "5/12/2025    18:31   CMP   Glucose 93  86  135    BUN 16  19  21    Creatinine 1.11  0.96  1.03    EGFR 56.7  67.5  62.0    Sodium 139  139  137    Potassium 4.7  4.3  4.0    Chloride 102  103  103    Calcium 9.5  9.1  9.1    Total Protein 6.9   6.8    Albumin 4.4   4.3    Globulin 2.5   2.5    Total Bilirubin 0.2   0.2    Alkaline Phosphatase 93   99    AST (SGOT) 24   20    ALT (SGPT) 13   13    Albumin/Globulin Ratio 1.8   1.7    BUN/Creatinine Ratio 14.4  19.8  20.4    Anion Gap 11.2  11.0  10.8      CBC w/diff          6/14/2024    09:36 4/22/2025    14:04 5/12/2025    18:31   CBC w/Diff   WBC 4.95  5.68  5.27    RBC 4.27  3.96  3.68    Hemoglobin 13.3  12.5  11.8    Hematocrit 40.2  37.3  34.7    MCV 94.1  94.2  94.3    MCH 31.1  31.6  32.1    MCHC 33.1  33.5  34.0    RDW 14.4  13.0  13.0    Platelets 303  400  350    Neutrophil Rel % 58.5  58.3  51.6    Immature Granulocyte Rel % 0.2  0.0  0.2    Lymphocyte Rel % 28.3  30.6  38.7    Monocyte Rel % 6.9  6.0  4.7    Eosinophil Rel % 5.7  4.6  4.4    Basophil Rel % 0.4  0.5  0.4       Lab Results   Component Value Date    TSH 1.800 05/12/2025      Lab Results   Component Value Date    FREET4 1.48 04/22/2025      No results found for: \"DDIMERQUANT\"  Magnesium   Date Value Ref Range Status   05/12/2025 2.2 1.6 - 2.4 mg/dL Final      No results found for: \"DIGOXIN\"   Lab Results   Component Value Date    TROPONINT <6 05/12/2025      No results found for: \"POCTROP\"(       Lipid Panel          6/14/2024    09:36 3/8/2025    07:36   Lipid Panel   Total Cholesterol 198  198    Triglycerides 87  46    HDL Cholesterol 55  57    VLDL Cholesterol 16  9    LDL Cholesterol  127  132    LDL/HDL Ratio 2.28  2.31      Results for orders placed in visit on 11/23/21    Adult Transthoracic Echo Complete W/ Cont if Necessary Per Protocol    Interpretation Summary  · Calculated left ventricular EF = 64% Estimated left ventricular EF was in agreement with the calculated left " ventricular EF.         No results found for this or any previous visit.     Results for orders placed in visit on 11/23/21    Treadmill Stress Test    Interpretation Summary  · The patient reported shortness of breath during the stress test.    1.  Patient with fair exercise capacity and able to achieve greater than 85% of target heart rate  2.  No clinical symptoms of angina  3.  No EKG evidence of ischemia  4.  This would indicate a low probability of occlusive CAD    Narrative & Impression   CT CARDIAC CALCIUM SCORE WO DYE     Date of Exam: 3/31/2025 1:54 PM EDT     Indication: Dyslipidemia, family history of heart disease in mother, elevated Lipoproteins.     Comparison: Chest x-ray 11/12/2023     Technique: Multiple thin section CT axial images were obtained without intravenous contrast per cardiac calcium scoring protocol.  Automated exposure control and iterative reconstruction methods were used.     Findings:  Agatston scores for individual coronary arteries are as follows:  Left main (LM): 0  Left anterior descending (LAD): 0  Left circumflex (CX): 0  Right coronary artery (RCA): 0  Total 0     There are findings of prior granulomatous disease exposure.     IMPRESSION:  Impression:  Negative examination. No identifiable atherosclerotic plaque.     Narrative & Impression   XR CHEST 1 VW     Date of Exam: 5/12/2025 6:52 PM EDT     Indication: Dysrhythmia Triage Protocol     Comparison: 4/20/2025     Findings:  The cardiomediastinal silhouette is within normal limits. Lungs are clear. No focal consolidation, pneumothorax, or significant pleural effusion. Osseous structures grossly intact.     IMPRESSION:  Impression:  No acute process.               Baseline normal sinus rhythm average heart rate of 86  Maximal heart rate 157  Minimum heart rate 63  PACs were 228  Atrial couplets 1  Atrial triplets were 1  SVT episode longest of which was 2.4 seconds regular rate of 1 10-1 20s consistent with an ectopic  atrial tachycardia        The 10-year ASCVD risk score (Nirmala CURRAN, et al., 2019) is: 2.9%    Values used to calculate the score:      Age: 59 years      Sex: Female      Is Non- : No      Diabetic: No      Tobacco smoker: No      Systolic Blood Pressure: 116 mmHg      Is BP treated: No      HDL Cholesterol: 57 mg/dL      Total Cholesterol: 198 mg/dL     Diagnoses and all orders for this visit:    1. PSVT (paroxysmal supraventricular tachycardia) (Primary)  Assessment & Plan:  Patient with episode of paroxysmal SVT rate anywhere in the 150s to 80s this could represent an ectopic atrial tachycardia versus some type of AVNRT episode her Holter monitor showed a slow ectopic atrial tachycardia rate in the 120s overall she does not appear to have frequent recurrent symptoms of any dysrhythmia discussed the pros and cons of prophylactic dosing of beta-blocker or calcium channel blocker.  Decided on holding off on therapy unless more recurrent episodes at this point we will go ahead and repeat her echocardiogram and stress test.    Orders:  -     Adult Transthoracic Echo Complete W/ Cont if Necessary Per Protocol; Future  -     Treadmill Stress Test; Future    2. Chest pain, atypical  Assessment & Plan:  With chest pressure episode that occurred with her paroxysmal SVT episode likely just rate related previous risk factors as well as recent calcium score all show relatively low risk for occlusive disease we will go ahead and check plain treadmill stress test though to make sure no ischemic issues    Orders:  -     Adult Transthoracic Echo Complete W/ Cont if Necessary Per Protocol; Future  -     Cancel: Treadmill Stress Test; Future  -     Cancel: Holter Monitor - 48 Hour; Future  -     Treadmill Stress Test; Future            Follow Up     Return in about 6 months (around 11/28/2025).          Patient was given instructions and counseling regarding her condition or for health maintenance advice.  Please see specific information pulled into the AVS if appropriate.

## 2025-05-28 ENCOUNTER — OFFICE VISIT (OUTPATIENT)
Dept: CARDIOLOGY | Facility: CLINIC | Age: 60
End: 2025-05-28
Payer: COMMERCIAL

## 2025-05-28 ENCOUNTER — HOSPITAL ENCOUNTER (OUTPATIENT)
Dept: MAMMOGRAPHY | Facility: HOSPITAL | Age: 60
Discharge: HOME | End: 2025-05-28
Admitting: NURSE PRACTITIONER
Payer: COMMERCIAL

## 2025-05-28 VITALS
HEART RATE: 77 BPM | HEIGHT: 62 IN | SYSTOLIC BLOOD PRESSURE: 116 MMHG | WEIGHT: 142 LBS | DIASTOLIC BLOOD PRESSURE: 50 MMHG | BODY MASS INDEX: 26.13 KG/M2

## 2025-05-28 DIAGNOSIS — I47.10 PSVT (PAROXYSMAL SUPRAVENTRICULAR TACHYCARDIA): Primary | ICD-10-CM

## 2025-05-28 DIAGNOSIS — R07.89 CHEST PAIN, ATYPICAL: ICD-10-CM

## 2025-05-28 DIAGNOSIS — Z12.31 BREAST CANCER SCREENING BY MAMMOGRAM: ICD-10-CM

## 2025-05-28 PROCEDURE — 77063 BREAST TOMOSYNTHESIS BI: CPT

## 2025-05-28 PROCEDURE — 77067 SCR MAMMO BI INCL CAD: CPT

## 2025-05-28 NOTE — ASSESSMENT & PLAN NOTE
Patient with episode of paroxysmal SVT rate anywhere in the 150s to 80s this could represent an ectopic atrial tachycardia versus some type of AVNRT episode her Holter monitor showed a slow ectopic atrial tachycardia rate in the 120s overall she does not appear to have frequent recurrent symptoms of any dysrhythmia discussed the pros and cons of prophylactic dosing of beta-blocker or calcium channel blocker.  Decided on holding off on therapy unless more recurrent episodes at this point we will go ahead and repeat her echocardiogram and stress test.

## 2025-05-28 NOTE — ASSESSMENT & PLAN NOTE
With chest pressure episode that occurred with her paroxysmal SVT episode likely just rate related previous risk factors as well as recent calcium score all show relatively low risk for occlusive disease we will go ahead and check plain treadmill stress test though to make sure no ischemic issues

## 2025-06-16 LAB
QT INTERVAL: 346 MS
QTC INTERVAL: 426 MS

## 2025-06-20 ENCOUNTER — HOSPITAL ENCOUNTER (OUTPATIENT)
Facility: HOSPITAL | Age: 60
Discharge: HOME | End: 2025-06-20
Payer: COMMERCIAL

## 2025-06-20 DIAGNOSIS — R07.89 CHEST PAIN, ATYPICAL: ICD-10-CM

## 2025-06-20 DIAGNOSIS — I47.10 PSVT (PAROXYSMAL SUPRAVENTRICULAR TACHYCARDIA): ICD-10-CM

## 2025-06-20 LAB
AORTIC DIMENSIONLESS INDEX: 0.76 (DI)
ASCENDING AORTA: 2.2 CM
AV MEAN PRESS GRAD SYS DOP V1V2: 4.5 MMHG
AV VMAX SYS DOP: 145 CM/SEC
BH CV ECHO MEAS - AO MAX PG: 8.4 MMHG
BH CV ECHO MEAS - AO ROOT DIAM: 2 CM
BH CV ECHO MEAS - AO V2 VTI: 29 CM
BH CV ECHO MEAS - AVA(I,D): 2.38 CM2
BH CV ECHO MEAS - EDV(MOD-SP2): 70.6 ML
BH CV ECHO MEAS - EDV(MOD-SP4): 63.4 ML
BH CV ECHO MEAS - EF(MOD-SP2): 61.8 %
BH CV ECHO MEAS - EF(MOD-SP4): 58.4 %
BH CV ECHO MEAS - ESV(MOD-SP2): 27 ML
BH CV ECHO MEAS - ESV(MOD-SP4): 26.4 ML
BH CV ECHO MEAS - IVS/LVPW: 0.89 CM
BH CV ECHO MEAS - IVSD: 0.8 CM
BH CV ECHO MEAS - LA DIMENSION: 2.4 CM
BH CV ECHO MEAS - LAT PEAK E' VEL: 16.5 CM/SEC
BH CV ECHO MEAS - LV DIASTOLIC VOL/BSA (35-75): 38.4 CM2
BH CV ECHO MEAS - LV MAX PG: 3.7 MMHG
BH CV ECHO MEAS - LV MEAN PG: 1.77 MMHG
BH CV ECHO MEAS - LV SYSTOLIC VOL/BSA (12-30): 16 CM2
BH CV ECHO MEAS - LV V1 MAX: 96.7 CM/SEC
BH CV ECHO MEAS - LV V1 VTI: 22 CM
BH CV ECHO MEAS - LVIDD: 4.2 CM
BH CV ECHO MEAS - LVIDS: 2.9 CM
BH CV ECHO MEAS - LVOT AREA: 3.1 CM2
BH CV ECHO MEAS - LVOT DIAM: 2 CM
BH CV ECHO MEAS - LVPWD: 0.9 CM
BH CV ECHO MEAS - MED PEAK E' VEL: 13.5 CM/SEC
BH CV ECHO MEAS - MR MAX PG: 14.3 MMHG
BH CV ECHO MEAS - MR MAX VEL: 182.7 CM/SEC
BH CV ECHO MEAS - MV A MAX VEL: 83.6 CM/SEC
BH CV ECHO MEAS - MV DEC SLOPE: 415 CM/SEC2
BH CV ECHO MEAS - MV E MAX VEL: 102.4 CM/SEC
BH CV ECHO MEAS - MV E/A: 1.22
BH CV ECHO MEAS - MV MAX PG: 5.4 MMHG
BH CV ECHO MEAS - MV MEAN PG: 2.4 MMHG
BH CV ECHO MEAS - MV P1/2T: 82 MSEC
BH CV ECHO MEAS - MV V2 VTI: 30.4 CM
BH CV ECHO MEAS - MVA(P1/2T): 2.7 CM2
BH CV ECHO MEAS - MVA(VTI): 2.27 CM2
BH CV ECHO MEAS - RAP SYSTOLE: 3 MMHG
BH CV ECHO MEAS - RVDD: 1.95 CM
BH CV ECHO MEAS - RVSP: 24 MMHG
BH CV ECHO MEAS - SV(LVOT): 69 ML
BH CV ECHO MEAS - SV(MOD-SP2): 43.6 ML
BH CV ECHO MEAS - SV(MOD-SP4): 37 ML
BH CV ECHO MEAS - SVI(LVOT): 41.8 ML/M2
BH CV ECHO MEAS - SVI(MOD-SP2): 26.4 ML/M2
BH CV ECHO MEAS - SVI(MOD-SP4): 22.4 ML/M2
BH CV ECHO MEAS - TR MAX PG: 21.1 MMHG
BH CV ECHO MEAS - TR MAX VEL: 229.6 CM/SEC
BH CV ECHO MEASUREMENTS AVERAGE E/E' RATIO: 6.83
BH CV IMMEDIATE POST RECOVERY TECH DATA SYMPTOMS: NORMAL
BH CV IMMEDIATE POST TECH DATA BLOOD PRESSURE: NORMAL MMHG
BH CV IMMEDIATE POST TECH DATA HEART RATE: 114 BPM
BH CV IMMEDIATE POST TECH DATA OXYGEN SATS: 98 %
BH CV SIX MINUTE RECOVERY TECH DATA BLOOD PRESSURE: NORMAL
BH CV SIX MINUTE RECOVERY TECH DATA HEART RATE: 94 BPM
BH CV SIX MINUTE RECOVERY TECH DATA OXYGEN SATURATION: 98 %
BH CV SIX MINUTE RECOVERY TECH DATA SYMPTOMS: NORMAL
BH CV STRESS BP STAGE 1: NORMAL
BH CV STRESS BP STAGE 2: NORMAL
BH CV STRESS DURATION MIN STAGE 1: 3
BH CV STRESS DURATION MIN STAGE 2: 3
BH CV STRESS DURATION SEC STAGE 1: 0
BH CV STRESS DURATION SEC STAGE 2: 0
BH CV STRESS GRADE STAGE 1: 10
BH CV STRESS GRADE STAGE 2: 12
BH CV STRESS HR STAGE 1: 133
BH CV STRESS HR STAGE 2: 158
BH CV STRESS METS STAGE 1: 5
BH CV STRESS METS STAGE 2: 7.5
BH CV STRESS O2 STAGE 1: 98
BH CV STRESS O2 STAGE 2: 93
BH CV STRESS PROTOCOL 1: NORMAL
BH CV STRESS RECOVERY BP: NORMAL MMHG
BH CV STRESS RECOVERY HR: 94 BPM
BH CV STRESS RECOVERY O2: 98 %
BH CV STRESS SPEED STAGE 1: 1.7
BH CV STRESS SPEED STAGE 2: 2.5
BH CV STRESS STAGE 1: 1
BH CV STRESS STAGE 2: 2
BH CV THREE MINUTE POST TECH DATA BLOOD PRESSURE: NORMAL MMHG
BH CV THREE MINUTE POST TECH DATA HEART RATE: 98 BPM
BH CV THREE MINUTE POST TECH DATA OXYGEN SATURATION: 99 %
BH CV XLRA - TDI S': 14.6 CM/SEC
LEFT ATRIUM VOLUME INDEX: 23 ML/M2
LV EF BIPLANE MOD: 60.5 %
MAXIMAL PREDICTED HEART RATE: 159 BPM
PERCENT MAX PREDICTED HR: 99.37 %
STRESS BASELINE BP: NORMAL MMHG
STRESS BASELINE HR: 82 BPM
STRESS O2 SAT REST: 98 %
STRESS PERCENT HR: 117 %
STRESS POST ESTIMATED WORKLOAD: 7.1 METS
STRESS POST EXERCISE DUR MIN: 6 MIN
STRESS POST EXERCISE DUR SEC: 0 SEC
STRESS POST O2 SAT PEAK: 98 %
STRESS POST PEAK BP: NORMAL MMHG
STRESS POST PEAK HR: 158 BPM
STRESS TARGET HR: 135 BPM

## 2025-06-20 PROCEDURE — 93306 TTE W/DOPPLER COMPLETE: CPT

## 2025-06-20 PROCEDURE — 93017 CV STRESS TEST TRACING ONLY: CPT

## 2025-06-23 ENCOUNTER — CLINICAL SUPPORT (OUTPATIENT)
Dept: FAMILY MEDICINE CLINIC | Facility: CLINIC | Age: 60
End: 2025-06-23
Payer: COMMERCIAL

## 2025-06-23 DIAGNOSIS — N28.9 RENAL INSUFFICIENCY: Primary | ICD-10-CM

## 2025-06-23 DIAGNOSIS — K74.3 PRIMARY BILIARY CIRRHOSIS: ICD-10-CM

## 2025-06-23 DIAGNOSIS — N28.9 RENAL INSUFFICIENCY: ICD-10-CM

## 2025-06-23 LAB
ALBUMIN SERPL-MCNC: 4.3 G/DL (ref 3.5–5.2)
ALBUMIN/GLOB SERPL: 1.5 G/DL
ALP SERPL-CCNC: 90 U/L (ref 39–117)
ALT SERPL W P-5'-P-CCNC: 10 U/L (ref 1–33)
ANION GAP SERPL CALCULATED.3IONS-SCNC: 14 MMOL/L (ref 5–15)
AST SERPL-CCNC: 24 U/L (ref 1–32)
BILIRUB SERPL-MCNC: 0.2 MG/DL (ref 0–1.2)
BUN SERPL-MCNC: 15 MG/DL (ref 8–23)
BUN/CREAT SERPL: 14.4 (ref 7–25)
CALCIUM SPEC-SCNC: 9.4 MG/DL (ref 8.6–10.5)
CHLORIDE SERPL-SCNC: 101 MMOL/L (ref 98–107)
CO2 SERPL-SCNC: 23 MMOL/L (ref 22–29)
CREAT SERPL-MCNC: 1.04 MG/DL (ref 0.57–1)
EGFRCR SERPLBLD CKD-EPI 2021: 61.3 ML/MIN/1.73
GLOBULIN UR ELPH-MCNC: 2.9 GM/DL
GLUCOSE SERPL-MCNC: 89 MG/DL (ref 65–99)
POTASSIUM SERPL-SCNC: 4.3 MMOL/L (ref 3.5–5.2)
PROT SERPL-MCNC: 7.2 G/DL (ref 6–8.5)
SODIUM SERPL-SCNC: 138 MMOL/L (ref 136–145)

## 2025-06-23 PROCEDURE — 36415 COLL VENOUS BLD VENIPUNCTURE: CPT | Performed by: NURSE PRACTITIONER

## 2025-06-23 PROCEDURE — 80053 COMPREHEN METABOLIC PANEL: CPT | Performed by: NURSE PRACTITIONER

## 2025-06-23 NOTE — PROGRESS NOTES
Venipuncture Blood Specimen Collection  Venipuncture performed in left arm by Shelli Arvizu with good hemostasis. Patient tolerated the procedure well without complications.   06/23/25   Shelli Arvizu

## 2025-06-25 ENCOUNTER — RESULTS FOLLOW-UP (OUTPATIENT)
Dept: CARDIOLOGY | Facility: CLINIC | Age: 60
End: 2025-06-25
Payer: COMMERCIAL

## 2025-06-25 LAB
BH CV IMMEDIATE POST RECOVERY TECH DATA SYMPTOMS: NORMAL
BH CV IMMEDIATE POST TECH DATA BLOOD PRESSURE: NORMAL MMHG
BH CV IMMEDIATE POST TECH DATA HEART RATE: 114 BPM
BH CV IMMEDIATE POST TECH DATA OXYGEN SATS: 98 %
BH CV SIX MINUTE RECOVERY TECH DATA BLOOD PRESSURE: NORMAL
BH CV SIX MINUTE RECOVERY TECH DATA HEART RATE: 94 BPM
BH CV SIX MINUTE RECOVERY TECH DATA OXYGEN SATURATION: 98 %
BH CV SIX MINUTE RECOVERY TECH DATA SYMPTOMS: NORMAL
BH CV STRESS BP STAGE 1: NORMAL
BH CV STRESS BP STAGE 2: NORMAL
BH CV STRESS DURATION MIN STAGE 1: 3
BH CV STRESS DURATION MIN STAGE 2: 3
BH CV STRESS DURATION SEC STAGE 1: 0
BH CV STRESS DURATION SEC STAGE 2: 0
BH CV STRESS GRADE STAGE 1: 10
BH CV STRESS GRADE STAGE 2: 12
BH CV STRESS HR STAGE 1: 133
BH CV STRESS HR STAGE 2: 158
BH CV STRESS METS STAGE 1: 5
BH CV STRESS METS STAGE 2: 7.5
BH CV STRESS O2 STAGE 1: 98
BH CV STRESS O2 STAGE 2: 93
BH CV STRESS PROTOCOL 1: NORMAL
BH CV STRESS RECOVERY BP: NORMAL MMHG
BH CV STRESS RECOVERY HR: 94 BPM
BH CV STRESS RECOVERY O2: 98 %
BH CV STRESS SPEED STAGE 1: 1.7
BH CV STRESS SPEED STAGE 2: 2.5
BH CV STRESS STAGE 1: 1
BH CV STRESS STAGE 2: 2
BH CV THREE MINUTE POST TECH DATA BLOOD PRESSURE: NORMAL MMHG
BH CV THREE MINUTE POST TECH DATA HEART RATE: 98 BPM
BH CV THREE MINUTE POST TECH DATA OXYGEN SATURATION: 99 %
MAXIMAL PREDICTED HEART RATE: 159 BPM
PERCENT MAX PREDICTED HR: 99.37 %
STRESS BASELINE BP: NORMAL MMHG
STRESS BASELINE HR: 82 BPM
STRESS O2 SAT REST: 98 %
STRESS PERCENT HR: 117 %
STRESS POST ESTIMATED WORKLOAD: 7.1 METS
STRESS POST EXERCISE DUR MIN: 6 MIN
STRESS POST EXERCISE DUR SEC: 0 SEC
STRESS POST O2 SAT PEAK: 98 %
STRESS POST PEAK BP: NORMAL MMHG
STRESS POST PEAK HR: 158 BPM
STRESS TARGET HR: 135 BPM

## 2025-06-26 NOTE — TELEPHONE ENCOUNTER
Per Alissa:  Stress test shows no sign of blockage in coronary vessels. Follow up as scheduled. Notify office of any new/worsening cardiac symptoms.    Spoke with patient, patient is aware and verbalized understanding.

## 2025-07-01 LAB
AORTIC DIMENSIONLESS INDEX: 0.76 (DI)
ASCENDING AORTA: 2.2 CM
AV MEAN PRESS GRAD SYS DOP V1V2: 4.5 MMHG
AV VMAX SYS DOP: 145 CM/SEC
BH CV ECHO MEAS - AO MAX PG: 8.4 MMHG
BH CV ECHO MEAS - AO ROOT DIAM: 2 CM
BH CV ECHO MEAS - AO V2 VTI: 29 CM
BH CV ECHO MEAS - AVA(I,D): 2.38 CM2
BH CV ECHO MEAS - EDV(MOD-SP2): 70.6 ML
BH CV ECHO MEAS - EDV(MOD-SP4): 63.4 ML
BH CV ECHO MEAS - EF(MOD-SP2): 61.8 %
BH CV ECHO MEAS - EF(MOD-SP4): 58.4 %
BH CV ECHO MEAS - ESV(MOD-SP2): 27 ML
BH CV ECHO MEAS - ESV(MOD-SP4): 26.4 ML
BH CV ECHO MEAS - IVS/LVPW: 0.89 CM
BH CV ECHO MEAS - IVSD: 0.8 CM
BH CV ECHO MEAS - LA DIMENSION: 2.4 CM
BH CV ECHO MEAS - LAT PEAK E' VEL: 16.5 CM/SEC
BH CV ECHO MEAS - LV DIASTOLIC VOL/BSA (35-75): 38.4 CM2
BH CV ECHO MEAS - LV MAX PG: 3.7 MMHG
BH CV ECHO MEAS - LV MEAN PG: 1.77 MMHG
BH CV ECHO MEAS - LV SYSTOLIC VOL/BSA (12-30): 16 CM2
BH CV ECHO MEAS - LV V1 MAX: 96.7 CM/SEC
BH CV ECHO MEAS - LV V1 VTI: 22 CM
BH CV ECHO MEAS - LVIDD: 4.2 CM
BH CV ECHO MEAS - LVIDS: 2.9 CM
BH CV ECHO MEAS - LVOT AREA: 3.1 CM2
BH CV ECHO MEAS - LVOT DIAM: 2 CM
BH CV ECHO MEAS - LVPWD: 0.9 CM
BH CV ECHO MEAS - MED PEAK E' VEL: 13.5 CM/SEC
BH CV ECHO MEAS - MR MAX PG: 14.3 MMHG
BH CV ECHO MEAS - MR MAX VEL: 182.7 CM/SEC
BH CV ECHO MEAS - MV A MAX VEL: 83.6 CM/SEC
BH CV ECHO MEAS - MV DEC SLOPE: 415 CM/SEC2
BH CV ECHO MEAS - MV E MAX VEL: 102.4 CM/SEC
BH CV ECHO MEAS - MV E/A: 1.22
BH CV ECHO MEAS - MV MAX PG: 5.4 MMHG
BH CV ECHO MEAS - MV MEAN PG: 2.4 MMHG
BH CV ECHO MEAS - MV P1/2T: 82 MSEC
BH CV ECHO MEAS - MV V2 VTI: 30.4 CM
BH CV ECHO MEAS - MVA(P1/2T): 2.7 CM2
BH CV ECHO MEAS - MVA(VTI): 2.27 CM2
BH CV ECHO MEAS - RAP SYSTOLE: 3 MMHG
BH CV ECHO MEAS - RVDD: 1.95 CM
BH CV ECHO MEAS - RVSP: 24 MMHG
BH CV ECHO MEAS - SV(LVOT): 69 ML
BH CV ECHO MEAS - SV(MOD-SP2): 43.6 ML
BH CV ECHO MEAS - SV(MOD-SP4): 37 ML
BH CV ECHO MEAS - SVI(LVOT): 41.8 ML/M2
BH CV ECHO MEAS - SVI(MOD-SP2): 26.4 ML/M2
BH CV ECHO MEAS - SVI(MOD-SP4): 22.4 ML/M2
BH CV ECHO MEAS - TR MAX PG: 21.1 MMHG
BH CV ECHO MEAS - TR MAX VEL: 229.6 CM/SEC
BH CV ECHO MEASUREMENTS AVERAGE E/E' RATIO: 6.83
BH CV XLRA - TDI S': 14.6 CM/SEC
LEFT ATRIUM VOLUME INDEX: 23 ML/M2
LV EF BIPLANE MOD: 60.5 %

## 2025-07-18 DIAGNOSIS — B00.1 HERPES LABIALIS: ICD-10-CM

## 2025-07-18 RX ORDER — VALACYCLOVIR HYDROCHLORIDE 1 G/1
1000 TABLET, FILM COATED ORAL DAILY
Qty: 90 TABLET | Refills: 3 | Status: SHIPPED | OUTPATIENT
Start: 2025-07-18

## 2025-08-06 ENCOUNTER — CLINICAL SUPPORT (OUTPATIENT)
Dept: FAMILY MEDICINE CLINIC | Facility: CLINIC | Age: 60
End: 2025-08-06
Payer: COMMERCIAL

## 2025-08-06 DIAGNOSIS — N28.9 RENAL INSUFFICIENCY: ICD-10-CM

## 2025-08-06 DIAGNOSIS — N28.9 RENAL INSUFFICIENCY: Primary | ICD-10-CM

## 2025-08-06 DIAGNOSIS — K74.3 PRIMARY BILIARY CIRRHOSIS: ICD-10-CM

## 2025-08-06 LAB
ALBUMIN SERPL-MCNC: 4.3 G/DL (ref 3.5–5.2)
ALBUMIN/GLOB SERPL: 2 G/DL
ALP SERPL-CCNC: 96 U/L (ref 39–117)
ALT SERPL W P-5'-P-CCNC: 16 U/L (ref 1–33)
ANION GAP SERPL CALCULATED.3IONS-SCNC: 10.8 MMOL/L (ref 5–15)
AST SERPL-CCNC: 24 U/L (ref 1–32)
BILIRUB SERPL-MCNC: <0.2 MG/DL (ref 0–1.2)
BUN SERPL-MCNC: 15 MG/DL (ref 8–23)
BUN/CREAT SERPL: 15.5 (ref 7–25)
CALCIUM SPEC-SCNC: 9.2 MG/DL (ref 8.6–10.5)
CHLORIDE SERPL-SCNC: 105 MMOL/L (ref 98–107)
CO2 SERPL-SCNC: 24.2 MMOL/L (ref 22–29)
CREAT SERPL-MCNC: 0.97 MG/DL (ref 0.57–1)
EGFRCR SERPLBLD CKD-EPI 2021: 66.2 ML/MIN/1.73
GLOBULIN UR ELPH-MCNC: 2.1 GM/DL
GLUCOSE SERPL-MCNC: 98 MG/DL (ref 65–99)
POTASSIUM SERPL-SCNC: 4.4 MMOL/L (ref 3.5–5.2)
PROT SERPL-MCNC: 6.4 G/DL (ref 6–8.5)
SODIUM SERPL-SCNC: 140 MMOL/L (ref 136–145)

## 2025-08-06 PROCEDURE — 36415 COLL VENOUS BLD VENIPUNCTURE: CPT | Performed by: NURSE PRACTITIONER

## 2025-08-06 PROCEDURE — 80053 COMPREHEN METABOLIC PANEL: CPT | Performed by: NURSE PRACTITIONER

## 2025-08-14 ENCOUNTER — CLINICAL SUPPORT (OUTPATIENT)
Dept: FAMILY MEDICINE CLINIC | Facility: CLINIC | Age: 60
End: 2025-08-14
Payer: COMMERCIAL

## 2025-08-14 DIAGNOSIS — J30.9 ALLERGIC RHINITIS, UNSPECIFIED SEASONALITY, UNSPECIFIED TRIGGER: ICD-10-CM

## 2025-08-14 DIAGNOSIS — J45.909 MILD ASTHMA, UNSPECIFIED WHETHER COMPLICATED, UNSPECIFIED WHETHER PERSISTENT: Primary | ICD-10-CM

## 2025-08-14 PROCEDURE — 95117 IMMUNOTHERAPY INJECTIONS: CPT | Performed by: NURSE PRACTITIONER

## 2025-08-21 ENCOUNTER — CLINICAL SUPPORT (OUTPATIENT)
Dept: FAMILY MEDICINE CLINIC | Facility: CLINIC | Age: 60
End: 2025-08-21
Payer: COMMERCIAL

## 2025-08-21 DIAGNOSIS — J30.9 ALLERGIC RHINITIS, UNSPECIFIED SEASONALITY, UNSPECIFIED TRIGGER: Primary | ICD-10-CM

## 2025-08-21 PROCEDURE — 95117 IMMUNOTHERAPY INJECTIONS: CPT | Performed by: NURSE PRACTITIONER

## 2025-08-28 ENCOUNTER — CLINICAL SUPPORT (OUTPATIENT)
Dept: FAMILY MEDICINE CLINIC | Facility: CLINIC | Age: 60
End: 2025-08-28
Payer: COMMERCIAL

## 2025-08-28 DIAGNOSIS — J30.9 ALLERGIC RHINITIS, UNSPECIFIED SEASONALITY, UNSPECIFIED TRIGGER: Primary | ICD-10-CM

## 2025-08-28 PROCEDURE — 95117 IMMUNOTHERAPY INJECTIONS: CPT | Performed by: NURSE PRACTITIONER

## (undated) DEVICE — SLV SCD KN/LEN ADJ EXPRSS BLENDED MD 1P/U

## (undated) DEVICE — STRIP CLS WND SUTURESTRIP/PLS 0.5X4IN TP1103

## (undated) DEVICE — GOWN,REINFRCE,POLY,SIRUS,BREATH SLV,XXLG: Brand: MEDLINE

## (undated) DEVICE — STERILE POLYISOPRENE POWDER-FREE SURGICAL GLOVES WITH EMOLLIENT COATING: Brand: PROTEXIS

## (undated) DEVICE — DRSNG SURG AQUACEL AG/ADVNTGE 9X15CM 3.5X6IN

## (undated) DEVICE — BRA COMPR SURG STL119 V/CLOSE/FRNT SZLG WHT

## (undated) DEVICE — GLV SURG SENSICARE PI ORTHO SZ8 LF STRL

## (undated) DEVICE — GOWN,REINFORCE,POLY,SIRUS,BREATH SLV,XLG: Brand: MEDLINE

## (undated) DEVICE — INTENDED FOR TISSUE SEPARATION, AND OTHER PROCEDURES THAT REQUIRE A SHARP SURGICAL BLADE TO PUNCTURE OR CUT.: Brand: BARD-PARKER ® CARBON RIB-BACK BLADES

## (undated) DEVICE — PENCL SMOKE/EVAC MEGADYNE TELESCP 10FT

## (undated) DEVICE — SUT PERMAHAND SILK 0 FSL 30IN BLK

## (undated) DEVICE — SUT VIC 3/0 SH 27IN J416H

## (undated) DEVICE — ADHS LIQ MASTISOL 2/3ML

## (undated) DEVICE — MAJOR-LF: Brand: MEDLINE INDUSTRIES, INC.

## (undated) DEVICE — ANTIBACTERIAL UNDYED BRAIDED (POLYGLACTIN 910), SYNTHETIC ABSORBABLE SUTURE: Brand: COATED VICRYL